# Patient Record
Sex: FEMALE | Race: BLACK OR AFRICAN AMERICAN | NOT HISPANIC OR LATINO | Employment: FULL TIME | URBAN - METROPOLITAN AREA
[De-identification: names, ages, dates, MRNs, and addresses within clinical notes are randomized per-mention and may not be internally consistent; named-entity substitution may affect disease eponyms.]

---

## 2023-02-15 PROBLEM — R10.2 VAGINAL PAIN: Status: ACTIVE | Noted: 2023-02-15

## 2023-02-15 PROBLEM — N92.0 HEAVY MENSES: Status: ACTIVE | Noted: 2023-02-15

## 2023-02-15 PROBLEM — G43.909 HEADACHE, MIGRAINE: Status: ACTIVE | Noted: 2023-02-15

## 2023-02-15 PROBLEM — N94.10 DYSPAREUNIA, FEMALE: Status: ACTIVE | Noted: 2023-02-15

## 2023-02-15 PROBLEM — R52 ACUTE PAIN: Status: ACTIVE | Noted: 2023-02-15

## 2023-02-15 PROBLEM — J20.9 ACUTE BRONCHITIS: Status: ACTIVE | Noted: 2023-02-15

## 2023-02-15 PROBLEM — D47.3 ESSENTIAL THROMBOCYTOSIS (MULTI): Status: ACTIVE | Noted: 2023-02-15

## 2023-02-15 PROBLEM — N39.0 ACUTE LOWER URINARY TRACT INFECTION: Status: ACTIVE | Noted: 2023-02-15

## 2023-02-15 PROBLEM — J30.1 SEASONAL ALLERGIC RHINITIS DUE TO POLLEN: Status: ACTIVE | Noted: 2023-02-15

## 2023-02-15 PROBLEM — F64.9 GENDER DYSPHORIA: Status: ACTIVE | Noted: 2023-02-15

## 2023-02-15 PROBLEM — N83.53 TORSION OF OVARY, OVARIAN PEDICLE, OR FALLOPIAN TUBE: Status: ACTIVE | Noted: 2023-02-15

## 2023-02-15 PROBLEM — R19.7 ACUTE DIARRHEA: Status: ACTIVE | Noted: 2023-02-15

## 2023-02-15 PROBLEM — F31.9 BIPOLAR AFFECTIVE DISORDER (MULTI): Status: ACTIVE | Noted: 2023-02-15

## 2023-02-15 PROBLEM — R63.5 ABNORMAL WEIGHT GAIN: Status: ACTIVE | Noted: 2023-02-15

## 2023-02-15 PROBLEM — F43.9 TRAUMA AND STRESSOR-RELATED DISORDER: Status: ACTIVE | Noted: 2023-02-15

## 2023-02-15 RX ORDER — GABAPENTIN 100 MG/1
CAPSULE ORAL
COMMUNITY
End: 2023-10-27 | Stop reason: ALTCHOICE

## 2023-02-15 RX ORDER — TOPIRAMATE 25 MG/1
TABLET ORAL
COMMUNITY
Start: 2022-08-15 | End: 2023-04-28 | Stop reason: SDUPTHER

## 2023-02-15 RX ORDER — ERGOCALCIFEROL 1.25 MG/1
CAPSULE ORAL
COMMUNITY
End: 2023-10-27 | Stop reason: ALTCHOICE

## 2023-02-15 RX ORDER — BUPROPION HYDROCHLORIDE 150 MG/1
TABLET, EXTENDED RELEASE ORAL
COMMUNITY
End: 2023-03-29 | Stop reason: ENTERED-IN-ERROR

## 2023-02-15 RX ORDER — ASPIRIN 81 MG/1
81 TABLET ORAL ONCE
COMMUNITY
Start: 2022-01-27

## 2023-02-15 RX ORDER — ALBUTEROL SULFATE 90 UG/1
AEROSOL, METERED RESPIRATORY (INHALATION)
COMMUNITY
End: 2023-10-27 | Stop reason: SDUPTHER

## 2023-02-15 RX ORDER — LAMOTRIGINE 100 MG/1
1 TABLET ORAL DAILY
COMMUNITY
Start: 2022-06-08 | End: 2023-04-28 | Stop reason: SDUPTHER

## 2023-02-15 RX ORDER — HYDROXYUREA 500 MG/1
4 CAPSULE ORAL DAILY
COMMUNITY
Start: 2015-07-29 | End: 2023-10-27 | Stop reason: SDUPTHER

## 2023-02-15 RX ORDER — CALCIUM CARBONATE/VITAMIN D3 500-10/5ML
LIQUID (ML) ORAL
COMMUNITY
End: 2023-10-27 | Stop reason: ALTCHOICE

## 2023-03-29 ENCOUNTER — OFFICE VISIT (OUTPATIENT)
Dept: PRIMARY CARE | Facility: CLINIC | Age: 38
End: 2023-03-29
Payer: COMMERCIAL

## 2023-03-29 VITALS
WEIGHT: 164.6 LBS | SYSTOLIC BLOOD PRESSURE: 125 MMHG | TEMPERATURE: 98.3 F | DIASTOLIC BLOOD PRESSURE: 81 MMHG | BODY MASS INDEX: 26.45 KG/M2 | HEIGHT: 66 IN | HEART RATE: 88 BPM | OXYGEN SATURATION: 100 %

## 2023-03-29 DIAGNOSIS — L81.1 CHLOASMA: ICD-10-CM

## 2023-03-29 DIAGNOSIS — F64.9 GENDER DYSPHORIA: ICD-10-CM

## 2023-03-29 DIAGNOSIS — L68.0 HIRSUTISM: Primary | ICD-10-CM

## 2023-03-29 DIAGNOSIS — Z00.00 HEALTH MAINTENANCE EXAMINATION: ICD-10-CM

## 2023-03-29 DIAGNOSIS — T78.40XS ALLERGIC REACTION, SEQUELA: ICD-10-CM

## 2023-03-29 PROBLEM — N92.0 HEAVY MENSES: Status: RESOLVED | Noted: 2023-02-15 | Resolved: 2023-03-29

## 2023-03-29 PROBLEM — D47.3 ESSENTIAL THROMBOCYTHEMIA (MULTI): Status: ACTIVE | Noted: 2023-03-29

## 2023-03-29 PROBLEM — R10.2 VAGINAL PAIN: Status: RESOLVED | Noted: 2023-02-15 | Resolved: 2023-03-29

## 2023-03-29 PROBLEM — D47.3 ESSENTIAL THROMBOCYTOSIS (MULTI): Status: RESOLVED | Noted: 2023-02-15 | Resolved: 2023-03-29

## 2023-03-29 PROBLEM — N39.0 ACUTE LOWER URINARY TRACT INFECTION: Status: RESOLVED | Noted: 2023-02-15 | Resolved: 2023-03-29

## 2023-03-29 PROBLEM — N94.10 DYSPAREUNIA, FEMALE: Status: RESOLVED | Noted: 2023-02-15 | Resolved: 2023-03-29

## 2023-03-29 PROBLEM — R19.7 ACUTE DIARRHEA: Status: RESOLVED | Noted: 2023-02-15 | Resolved: 2023-03-29

## 2023-03-29 PROBLEM — J20.9 ACUTE BRONCHITIS: Status: RESOLVED | Noted: 2023-02-15 | Resolved: 2023-03-29

## 2023-03-29 PROCEDURE — 1036F TOBACCO NON-USER: CPT | Performed by: FAMILY MEDICINE

## 2023-03-29 PROCEDURE — 99214 OFFICE O/P EST MOD 30 MIN: CPT | Performed by: FAMILY MEDICINE

## 2023-03-29 RX ORDER — TRETINOIN 0.5 MG/G
CREAM TOPICAL NIGHTLY
Qty: 45 G | Refills: 5 | Status: SHIPPED | OUTPATIENT
Start: 2023-03-29 | End: 2024-03-28

## 2023-03-29 RX ORDER — CLINDAMYCIN PHOSPHATE 10 UG/ML
LOTION TOPICAL DAILY
Qty: 60 ML | Refills: 2 | Status: SHIPPED | OUTPATIENT
Start: 2023-03-29 | End: 2024-03-28

## 2023-03-29 RX ORDER — HYDROCORTISONE 25 MG/G
CREAM TOPICAL DAILY
Qty: 30 G | Refills: 5 | Status: SHIPPED | OUTPATIENT
Start: 2023-03-29 | End: 2024-03-28

## 2023-03-29 RX ORDER — ZIPRASIDONE HYDROCHLORIDE 40 MG/1
40 CAPSULE ORAL DAILY
COMMUNITY
End: 2023-10-27 | Stop reason: ALTCHOICE

## 2023-03-29 RX ORDER — HYDROQUINONE 40 MG/G
CREAM TOPICAL DAILY
Qty: 28 G | Refills: 1 | Status: SHIPPED | OUTPATIENT
Start: 2023-03-29 | End: 2024-03-28

## 2023-03-29 ASSESSMENT — PAIN SCALES - GENERAL: PAINLEVEL: 0-NO PAIN

## 2023-03-29 NOTE — PROGRESS NOTES
"Subjective   Patient ID: Veena Manriquez is a 37 y.o. who presents for New Patient Visit (Patient here for regular office visit).  HPI    Concern about PCOS  -Has always grown facial hair  -Had heavy menses  -Had prior ovarian cysts. History of ovarian torsion  -nonbinary AFAB individual, looking to remain hormonally neutral. Bothered by facial hair. Causes dysphoria    Gender dysphoria  - Has undergone top surgery and phalloplasty. Undergoing next stage in May  - Has dysphoria over facial hair (as above)  - Uses any pronouns    Macules on arm  - Follows with dermatology  - Thought to be reaction to mosquito bite  - Recommended hydoquinone 6%, tretinoin 0.05%, hydrocortisone 2.5 qod  -clindamycin 1% on face    Request for allergist  - Concerned that this may be related to skin condition  - Also worried about food allergy, particularly milk    History of essential thrombocythemia, Jak2  - Taking hydroxyurea, following with heme/onc    Additionally has concern about short term memory loss and about brittle hair. Will further address at next appointment.     Review of Systems  10 point review of systems negative except as noted in HPI.      Objective     /81 (BP Location: Left arm, Patient Position: Sitting, BP Cuff Size: Small adult)   Pulse 88   Temp 36.8 °C (98.3 °F) (Temporal)   Ht 1.676 m (5' 6\")   Wt 74.7 kg (164 lb 9.6 oz)   SpO2 100%   BMI 26.57 kg/m²   General: well appearing, no distress  Neck: No lymphadenopathy, no thyromegaly  CV: Regular rate and rhythm, no murmur  Lungs: Clear to auscultation bilaterally  Abdomen: Soft, nontender, nondistended  Extremities: No edema noted  Skin: Hyperpigmented macules scattered on arms. Hair growth noted on chin  Psych: Appropriate mood and affect     Assessment/Plan   38 yo here to establish care. Plan as follows:    #Concern for PCOS  - Given h/o clinical hyperandrogenism and irregular menses I think there is a possibility for PCOS. However currently no menses " due to surgical status. We discussed checking testosterone level and considering medication- OCP or spironolactone- pending results. They would also like to speak with Dr. Mccann and will schedule an appointment.     #Skin lesions  - Management per dermatology    #Essential thrombocythemia  - Labs stable, continue current regimen    #Bipolar disorder  - Management per psychiatry    #HM  - Check lipids    Follow up 1 month or sooner as needed

## 2023-04-21 ENCOUNTER — LAB (OUTPATIENT)
Dept: LAB | Facility: LAB | Age: 38
End: 2023-04-21
Payer: COMMERCIAL

## 2023-04-21 DIAGNOSIS — L68.0 HIRSUTISM: ICD-10-CM

## 2023-04-21 DIAGNOSIS — Z00.00 HEALTH MAINTENANCE EXAMINATION: ICD-10-CM

## 2023-04-21 LAB
CHOLESTEROL (MG/DL) IN SER/PLAS: 117 MG/DL (ref 0–199)
CHOLESTEROL IN HDL (MG/DL) IN SER/PLAS: 27 MG/DL
CHOLESTEROL/HDL RATIO: 4.3
LDL: 37 MG/DL (ref 0–99)
NON HDL CHOLESTEROL: 90 MG/DL
TESTOSTERONE (NG/DL) IN SER/PLAS: <60 NG/DL (ref 0–70)
TRIGLYCERIDE (MG/DL) IN SER/PLAS: 267 MG/DL (ref 0–149)
VLDL: 53 MG/DL (ref 0–40)

## 2023-04-21 PROCEDURE — 84403 ASSAY OF TOTAL TESTOSTERONE: CPT

## 2023-04-21 PROCEDURE — 80061 LIPID PANEL: CPT

## 2023-04-22 LAB — URINE CULTURE: NORMAL

## 2023-04-28 ENCOUNTER — OFFICE VISIT (OUTPATIENT)
Dept: PRIMARY CARE | Facility: CLINIC | Age: 38
End: 2023-04-28
Payer: COMMERCIAL

## 2023-04-28 VITALS
DIASTOLIC BLOOD PRESSURE: 78 MMHG | TEMPERATURE: 98.6 F | OXYGEN SATURATION: 98 % | BODY MASS INDEX: 26.37 KG/M2 | SYSTOLIC BLOOD PRESSURE: 126 MMHG | WEIGHT: 163.4 LBS | HEART RATE: 76 BPM

## 2023-04-28 DIAGNOSIS — L68.0 HIRSUTISM: Primary | ICD-10-CM

## 2023-04-28 DIAGNOSIS — F31.9 BIPOLAR AFFECTIVE DISORDER, REMISSION STATUS UNSPECIFIED (MULTI): ICD-10-CM

## 2023-04-28 DIAGNOSIS — E78.1 HYPERTRIGLYCERIDEMIA WITHOUT HYPERCHOLESTEROLEMIA: ICD-10-CM

## 2023-04-28 PROCEDURE — 99214 OFFICE O/P EST MOD 30 MIN: CPT | Performed by: FAMILY MEDICINE

## 2023-04-28 PROCEDURE — 1036F TOBACCO NON-USER: CPT | Performed by: FAMILY MEDICINE

## 2023-04-28 RX ORDER — LAMOTRIGINE 100 MG/1
100 TABLET ORAL DAILY
Qty: 30 TABLET | Refills: 1 | Status: SHIPPED | OUTPATIENT
Start: 2023-04-28 | End: 2023-10-27 | Stop reason: SDUPTHER

## 2023-04-28 RX ORDER — TOPIRAMATE 25 MG/1
25 TABLET ORAL 2 TIMES DAILY
Qty: 60 TABLET | Refills: 1 | Status: SHIPPED | OUTPATIENT
Start: 2023-04-28 | End: 2023-10-27 | Stop reason: ALTCHOICE

## 2023-04-28 RX ORDER — SPIRONOLACTONE 50 MG/1
50 TABLET, FILM COATED ORAL DAILY
Qty: 30 TABLET | Refills: 2 | Status: SHIPPED | OUTPATIENT
Start: 2023-04-28 | End: 2024-04-27

## 2023-04-28 RX ORDER — BUPROPION HYDROCHLORIDE 150 MG/1
150 TABLET, EXTENDED RELEASE ORAL 2 TIMES DAILY
Qty: 60 TABLET | Refills: 1 | Status: SHIPPED | OUTPATIENT
Start: 2023-04-28 | End: 2023-10-27 | Stop reason: SDUPTHER

## 2023-04-28 RX ORDER — PRAZOSIN HYDROCHLORIDE 1 MG/1
1 CAPSULE ORAL NIGHTLY
Qty: 30 CAPSULE | Refills: 11 | Status: SHIPPED | OUTPATIENT
Start: 2023-04-28 | End: 2023-10-27 | Stop reason: ALTCHOICE

## 2023-04-28 RX ORDER — SPIRONOLACTONE 50 MG/1
50 TABLET, FILM COATED ORAL DAILY
Qty: 30 TABLET | Refills: 2 | Status: SHIPPED | OUTPATIENT
Start: 2023-04-28 | End: 2023-04-28

## 2023-04-28 ASSESSMENT — PAIN SCALES - GENERAL: PAINLEVEL: 0-NO PAIN

## 2023-04-28 ASSESSMENT — ENCOUNTER SYMPTOMS
RHINORRHEA: 0
HEADACHES: 0
ABDOMINAL PAIN: 0
CHILLS: 0
NAUSEA: 0
ARTHRALGIAS: 0
DIARRHEA: 0
DIZZINESS: 0
JOINT SWELLING: 0
SHORTNESS OF BREATH: 0
SORE THROAT: 0
VOMITING: 0
LIGHT-HEADEDNESS: 0
FEVER: 0
COUGH: 0

## 2023-04-28 NOTE — PROGRESS NOTES
Subjective   Patient ID: Veena Manriquez is a 37 y.o. adult who presents for Follow-up (Cholesterol and meds).    HPI  #Hypertriglyceridemia  - pt concerned about elevated cholesterol  - has changed diet significantly, cut out a lot of fried foods and sweets, plus incorporated more vegetables    #Hirsutism  - not interested in hormone therapy  - wants to get rid facial hair permanently  - shaving off hair since age of 15  - facial hair causing dsyphoria    #Meds Refills Needed:  Topiramate 25mg (two tablets)  Wellbutrin 150mg SR (twice a day)  Stopped Geodon 40mg- now on prazosin  Lamictal 100mg for PTSD  Gabapentin 100mg once a day    #Bipolar Disorder  - driving to [facility] [facility], cannot affford the gas money to go there 3x a week  - still seeing therapist and psychiatrist in addition to IOP  - stopped taking Geodon, new medicaiton prazosin is working well  - plans to quit intensive outpatient counseling      Review of Systems   Constitutional:  Negative for chills and fever.   HENT:  Negative for congestion, rhinorrhea and sore throat.    Eyes:  Negative for visual disturbance.   Respiratory:  Negative for cough and shortness of breath.    Cardiovascular:  Negative for chest pain.   Gastrointestinal:  Negative for abdominal pain, diarrhea, nausea and vomiting.   Genitourinary: Negative.    Musculoskeletal:  Negative for arthralgias and joint swelling.   Neurological:  Negative for dizziness, light-headedness and headaches.   Psychiatric/Behavioral:          No changes in mood or behavior       Objective   /78 (BP Location: Right arm, Patient Position: Sitting, BP Cuff Size: Adult)   Pulse 76   Temp 37 °C (98.6 °F) (Temporal)   Wt 74.1 kg (163 lb 6.4 oz)   SpO2 98%   BMI 26.37 kg/m²      Physical Exam  Constitutional:       General: Veena Manriquez is not in acute distress.  HENT:      Head: Normocephalic and atraumatic.   Eyes:      Extraocular Movements: Extraocular movements intact.       Conjunctiva/sclera: Conjunctivae normal.   Cardiovascular:      Rate and Rhythm: Normal rate and regular rhythm.   Pulmonary:      Effort: Pulmonary effort is normal.      Breath sounds: Normal breath sounds.   Abdominal:      General: There is no distension.      Palpations: Abdomen is soft.   Musculoskeletal:         General: Normal range of motion.      Cervical back: Normal range of motion.   Skin:     General: Skin is warm and dry.   Neurological:      General: No focal deficit present.      Mental Status: Veena Manriquez is alert and oriented to person, place, and time.      Comments: CN 2-12 grossly intact   Psychiatric:         Mood and Affect: Mood normal.         Behavior: Behavior normal.       Assessment/Plan   Veena was seen today for follow-up.  Diagnoses and all orders for this visit:  Hirsutism  Comments:  Restarted spironolactone to help reduce hirsuitism.  Orders:  -     Discontinue: spironolactone (Aldactone) 50 mg tablet; Take 1 tablet (50 mg) by mouth once daily.  -     spironolactone (Aldactone) 50 mg tablet; Take 1 tablet (50 mg) by mouth once daily.  Bipolar affective disorder, remission status unspecified (CMS/[facility])  Comments:  Refilled meds until patient follows up with their Psychiatrist  Orders:  -     prazosin (Minipress) 1 mg capsule; Take 1 capsule (1 mg) by mouth once daily at bedtime.  -     lamoTRIgine (LaMICtal) 100 mg tablet; Take 1 tablet (100 mg) by mouth once daily.  -     buPROPion SR (Wellbutrin SR) 150 mg 12 hr tablet; Take 1 tablet (150 mg) by mouth 2 times a day. Do not crush, chew, or split.  -     topiramate (Topamax) 25 mg tablet; Take 1 tablet (25 mg) by mouth 2 times a day. TAKE 1 TABLET BY MOUTH AT BEDTIME, AFTER 2 WEEKS, IF TOLERATING WELL, INCREASE TO 2 TABLETS AT BEDTIME  Hypertriglyceridemia without hypercholesterolemia  Comments:  Likely 2/2 to history of Geodon use. Will recheck in few months now that patient if off Geodon.  Other orders  -     Follow Up In Primary  Care  -     Follow Up In Primary Care; Future  -     Follow Up In Primary Care; Future    Follow up/Return to the clinic in 3 months    Attending Supervision: Patient seen and discussed with attending physician, Dr. Quinton Dunne MD  Family Medicine, PGY-2

## 2023-05-01 NOTE — PROGRESS NOTES
I saw and evaluated the patient. I personally obtained the key and critical portions of the history and physical exam or was physically present for key and critical portions performed by the resident/fellow. I reviewed the resident/fellow's documentation and discussed the patient with the resident/fellow. I agree with the resident/fellow's medical decision making as documented in the note.    Emmy Alcantara MD

## 2023-05-07 LAB — SARS-COV-2 RESULT: NOT DETECTED

## 2023-05-10 ENCOUNTER — HOSPITAL ENCOUNTER (OUTPATIENT)
Dept: DATA CONVERSION | Facility: HOSPITAL | Age: 38
End: 2023-05-11
Attending: UROLOGY | Admitting: UROLOGY
Payer: COMMERCIAL

## 2023-05-10 DIAGNOSIS — Z79.82 LONG TERM (CURRENT) USE OF ASPIRIN: ICD-10-CM

## 2023-05-10 DIAGNOSIS — G43.909 MIGRAINE, UNSPECIFIED, NOT INTRACTABLE, WITHOUT STATUS MIGRAINOSUS: ICD-10-CM

## 2023-05-10 DIAGNOSIS — F64.9 GENDER IDENTITY DISORDER, UNSPECIFIED: ICD-10-CM

## 2023-05-10 DIAGNOSIS — R63.5 ABNORMAL WEIGHT GAIN: ICD-10-CM

## 2023-05-10 DIAGNOSIS — D68.9 COAGULATION DEFECT, UNSPECIFIED (MULTI): ICD-10-CM

## 2023-05-10 DIAGNOSIS — J45.909 UNSPECIFIED ASTHMA, UNCOMPLICATED (HHS-HCC): ICD-10-CM

## 2023-05-10 DIAGNOSIS — R05.9 COUGH, UNSPECIFIED: ICD-10-CM

## 2023-05-10 DIAGNOSIS — D47.3 ESSENTIAL (HEMORRHAGIC) THROMBOCYTHEMIA (MULTI): ICD-10-CM

## 2023-05-10 DIAGNOSIS — F31.9 BIPOLAR DISORDER, UNSPECIFIED (MULTI): ICD-10-CM

## 2023-05-10 DIAGNOSIS — Z15.89 GENETIC SUSCEPTIBILITY TO OTHER DISEASE: ICD-10-CM

## 2023-05-10 DIAGNOSIS — F43.10 POST-TRAUMATIC STRESS DISORDER, UNSPECIFIED: ICD-10-CM

## 2023-06-06 LAB
GRAM STAIN: ABNORMAL
TISSUE/WOUND CULTURE/SMEAR: ABNORMAL
TISSUE/WOUND CULTURE/SMEAR: ABNORMAL

## 2023-07-20 ENCOUNTER — APPOINTMENT (OUTPATIENT)
Dept: PRIMARY CARE | Facility: CLINIC | Age: 38
End: 2023-07-20
Payer: COMMERCIAL

## 2023-07-21 LAB
GRAM STAIN: NORMAL
TISSUE/WOUND CULTURE/SMEAR: NORMAL

## 2023-08-16 LAB
ANION GAP IN SER/PLAS: 14 MMOL/L (ref 10–20)
CALCIUM (MG/DL) IN SER/PLAS: 9.8 MG/DL (ref 8.6–10.6)
CARBON DIOXIDE, TOTAL (MMOL/L) IN SER/PLAS: 22 MMOL/L (ref 21–32)
CHLORIDE (MMOL/L) IN SER/PLAS: 107 MMOL/L (ref 98–107)
CHOLESTEROL (MG/DL) IN SER/PLAS: 199 MG/DL (ref 0–199)
CHOLESTEROL IN HDL (MG/DL) IN SER/PLAS: 32.8 MG/DL
CHOLESTEROL/HDL RATIO: 6.1
CREATININE (MG/DL) IN SER/PLAS: 0.96 MG/DL (ref 0.5–1.05)
GFR FEMALE: 77 ML/MIN/1.73M2
GLUCOSE (MG/DL) IN SER/PLAS: 123 MG/DL (ref 74–99)
LDL: 111 MG/DL (ref 0–99)
NON HDL CHOLESTEROL: 166 MG/DL
POTASSIUM (MMOL/L) IN SER/PLAS: 3.9 MMOL/L (ref 3.5–5.3)
SODIUM (MMOL/L) IN SER/PLAS: 139 MMOL/L (ref 136–145)
TRIGLYCERIDE (MG/DL) IN SER/PLAS: 276 MG/DL (ref 0–149)
UREA NITROGEN (MG/DL) IN SER/PLAS: 13 MG/DL (ref 6–23)
VLDL: 55 MG/DL (ref 0–40)

## 2023-08-19 PROBLEM — J45.20 MILD INTERMITTENT ASTHMA WITHOUT COMPLICATION (HHS-HCC): Status: ACTIVE | Noted: 2023-08-19

## 2023-08-19 PROBLEM — T14.8XXA WOUND INFECTION: Status: ACTIVE | Noted: 2023-08-19

## 2023-08-19 PROBLEM — C44.92 SCC (SQUAMOUS CELL CARCINOMA): Status: ACTIVE | Noted: 2023-08-19

## 2023-08-19 PROBLEM — B37.0 ORAL CANDIDA: Status: ACTIVE | Noted: 2023-08-19

## 2023-08-19 PROBLEM — L08.9 WOUND INFECTION: Status: ACTIVE | Noted: 2023-08-19

## 2023-08-19 RX ORDER — ACETAMINOPHEN 500 MG
1-2 TABLET ORAL EVERY 6 HOURS PRN
COMMUNITY
Start: 2023-03-15

## 2023-08-19 RX ORDER — ALBUTEROL SULFATE 90 UG/1
2 AEROSOL, METERED RESPIRATORY (INHALATION) SEE ADMIN INSTRUCTIONS
COMMUNITY
Start: 2023-06-28

## 2023-08-19 RX ORDER — HYDROXYUREA 500 MG/1
500 CAPSULE ORAL 2 TIMES DAILY
COMMUNITY
Start: 2016-01-27 | End: 2023-11-30 | Stop reason: SDUPTHER

## 2023-08-19 RX ORDER — GABAPENTIN 300 MG/1
300 CAPSULE ORAL
COMMUNITY
End: 2023-11-30 | Stop reason: SDUPTHER

## 2023-08-19 RX ORDER — FLUTICASONE PROPIONATE 50 MCG
2 SPRAY, SUSPENSION (ML) NASAL DAILY
COMMUNITY
Start: 2023-06-28 | End: 2023-10-27 | Stop reason: SDUPTHER

## 2023-08-19 RX ORDER — BUPROPION HYDROCHLORIDE 100 MG/1
1 TABLET, EXTENDED RELEASE ORAL DAILY
COMMUNITY
Start: 2023-05-18 | End: 2023-10-27 | Stop reason: SDUPTHER

## 2023-08-19 RX ORDER — BUPROPION HYDROCHLORIDE 150 MG/1
1 TABLET, EXTENDED RELEASE ORAL DAILY
COMMUNITY
Start: 2023-05-18 | End: 2023-10-27 | Stop reason: SDUPTHER

## 2023-08-19 RX ORDER — LAMOTRIGINE 25 MG/1
25 TABLET ORAL
COMMUNITY
End: 2023-10-27 | Stop reason: ALTCHOICE

## 2023-08-19 RX ORDER — ONDANSETRON 4 MG/1
4 TABLET, ORALLY DISINTEGRATING ORAL EVERY 8 HOURS PRN
COMMUNITY
Start: 2023-03-15 | End: 2023-10-27 | Stop reason: ALTCHOICE

## 2023-08-19 RX ORDER — CHLORHEXIDINE GLUCONATE ORAL RINSE 1.2 MG/ML
15 SOLUTION DENTAL 2 TIMES DAILY
COMMUNITY
Start: 2023-05-23 | End: 2023-10-27 | Stop reason: ALTCHOICE

## 2023-08-19 RX ORDER — TRIAMCINOLONE ACETONIDE 1 MG/G
1 OINTMENT TOPICAL AS NEEDED
COMMUNITY
Start: 2023-03-30

## 2023-08-19 RX ORDER — CHOLECALCIFEROL (VITAMIN D3) 25 MCG
2000 TABLET ORAL
COMMUNITY

## 2023-08-19 RX ORDER — BUPROPION HYDROCHLORIDE 150 MG/1
150 TABLET, EXTENDED RELEASE ORAL
COMMUNITY
Start: 2023-05-19 | End: 2023-10-27 | Stop reason: SDUPTHER

## 2023-08-19 RX ORDER — CLOBETASOL PROPIONATE 0.5 MG/G
1 OINTMENT TOPICAL AS NEEDED
COMMUNITY
Start: 2023-03-09

## 2023-08-19 RX ORDER — FLUTICASONE PROPIONATE 50 MCG
1 SPRAY, SUSPENSION (ML) NASAL
COMMUNITY
Start: 2021-05-14

## 2023-08-19 RX ORDER — TOPIRAMATE 50 MG/1
50 TABLET, FILM COATED ORAL NIGHTLY
COMMUNITY
Start: 2023-05-19 | End: 2023-10-27 | Stop reason: SDUPTHER

## 2023-08-19 RX ORDER — DOCUSATE SODIUM 100 MG/1
100 CAPSULE, LIQUID FILLED ORAL 2 TIMES DAILY
COMMUNITY
Start: 2023-03-15

## 2023-08-19 RX ORDER — LANOLIN ALCOHOL/MO/W.PET/CERES
400 CREAM (GRAM) TOPICAL AS NEEDED
COMMUNITY
Start: 2013-11-14 | End: 2023-10-27 | Stop reason: ALTCHOICE

## 2023-08-19 RX ORDER — METFORMIN HYDROCHLORIDE 500 MG/1
500 TABLET, EXTENDED RELEASE ORAL
COMMUNITY
Start: 2023-01-16 | End: 2023-10-27 | Stop reason: ALTCHOICE

## 2023-08-19 RX ORDER — METFORMIN HYDROCHLORIDE 500 MG/1
1000 TABLET, EXTENDED RELEASE ORAL
COMMUNITY
Start: 2023-05-19 | End: 2023-10-27 | Stop reason: ALTCHOICE

## 2023-08-19 RX ORDER — CETIRIZINE HYDROCHLORIDE 10 MG/1
1 TABLET ORAL DAILY PRN
COMMUNITY
Start: 2023-06-28 | End: 2023-10-27 | Stop reason: ALTCHOICE

## 2023-08-19 RX ORDER — BUPROPION HYDROCHLORIDE 150 MG/1
150 TABLET, EXTENDED RELEASE ORAL 2 TIMES DAILY
COMMUNITY
Start: 2023-01-16 | End: 2023-10-27 | Stop reason: SDUPTHER

## 2023-09-07 VITALS
HEART RATE: 91 BPM | SYSTOLIC BLOOD PRESSURE: 118 MMHG | RESPIRATION RATE: 16 BRPM | OXYGEN SATURATION: 99 % | DIASTOLIC BLOOD PRESSURE: 75 MMHG | BODY MASS INDEX: 26.01 KG/M2 | WEIGHT: 161.82 LBS | HEIGHT: 66 IN | TEMPERATURE: 97.9 F

## 2023-09-14 NOTE — H&P
History of Present Illness:   Pregnant/Lactating:  ·  Are You Pregnant no (1)   ·  Are You Currently Breastfeeding no (1)     History Present Illness:  Reason for surgery: gender dysphoria   HPI:    38 yo nonbinary individual with history of JAK2+ essntial thrombocyhemia, anxiety/depression, asthma, PTSD, bipolar disorder, nephrolithiasis, gender dysphoria s/p  abdominal phalloplasty in 3/2022 and STSG stage 1 urethroplasty 9/2022 who presents for staged buccal graft urethroplasty for hypertrophic scarring of urethral plate. Patint also with no snsation of neophallus.     Allergies:        Allergies:  ·  erythromycin : Other       Intolerances:  ·  hydrocodone : Nausea/Vomiting    Home Medication Review:   Home Medications Reviewed: yes     Impression/Procedure:   ·  Impression and Planned Procedure: staged urethroplasty with buccal graft, left sural nerve grafting and clitoral burying       ERAS (Enhanced Recovery After Surgery):  ·  ERAS Patient: no       Vital Signs:  Temperature C: 36.6 degrees C   Temperature F: 97.8 degrees F   Heart Rate: 85 beats per minute   Respiratory Rate: 16 breath per minute   Blood Pressure Systolic: 118 mm/Hg   Blood Pressure Diastolic: 75 mm/Hg     Physical Exam by System:    Respiratory/Thorax: nonlabored on RA   Cardiovascular: RRR     Consent:   COVID-19 Consent:  ·  COVID-19 Risk Consent Surgeon has reviewed key risks related to the risk of eric COVID-19 and if they contract COVID-19 what the risks are.     Attestation:   Note Completion:  I am a:  Resident/Fellow   Attending Attestation I saw and evaluated the patient.  I personally obtained the key and critical portions of the history and physical exam or was physically present for key and  critical portions performed by the resident/fellow. I reviewed the resident/fellow?s documentation and discussed the patient with the resident/fellow.  I agree with the resident/fellow?s medical decision making as documented in  "the note.     I personally evaluated the patient on 10-May-2023         Electronic Signatures:  Mario Montes)  (Signed 11-May-2023 14:25)   Authored: Note Completion   Co-Signer: History of Present Illness, Allergies, Home Medication Review, Impression/Procedure, ERAS, Physical Exam, Consent, Note Completion  Kendra Villalba (Resident))  (Signed 10-May-2023 08:12)   Authored: History of Present Illness, Allergies, Home  Medication Review, Impression/Procedure, ERAS, Physical Exam, Consent, Note Completion      Last Updated: 11-May-2023 14:25 by Mario Montes)    References:  1.  Data Referenced From \"Patient Profile - Preop v3\" 10-May-2023 07:49   "

## 2023-09-14 NOTE — PROGRESS NOTES
Service: Urology     Subjective Data:   DINESH MANRIQUEZ)  is a 37 year old trans male (They/Them) who is Hospital Day # 2 and POD #1 for staged urethroplasty with buccal mucosal graft and preputial graft, clitoral transposition, left clitoral  nerve neurolysis and Avance nerve grafting, complex wound closure with tubularization of neophallus.     Tolerated procedure well. Post-operatively was experiencing some right lower leg numbness, saying they had difficulty walking. This AM strength and mobility of right lower extremity has significantly  improved. Tolerating liquids. No n/v, passing of gas or BM.    Objective Data:     Objective Information:      T   P  R  BP   MAP  SpO2   Value  36.5  89  18  104/65      99%  Date/Time 5/11 5:18 5/11 5:18 5/11 5:18 5/11 5:18    5/11 5:18  Range  (36.4C - 36.7C )  (66 - 89 )  (16 - 18 )  (104 - 132 )/ (65 - 82 )    (98% - 100% )      Pain reported at 5/11 10:52: 2 = Mild    Physical Exam Narrative:  ·  Physical Exam:    Constitutional: NAD, awake and alert  Head/neck: NCAT, neck supple with trachea midline  Eyes: EOMI, sclerae anicteric  ENT: moist mucous membranes, left inner cheek graft site healing well  Pulm: Breathing comfortably on RA  CV: Regular rate and rhythm  GI: Abd soft, NT, ND  : well healed neophallus with ventral incision that is c/d/i, native urethra patent  Extremities: No LE edema, full strength bilaterally in lower extremities, reports numbness in right lower extremity  Psych: Appropriate mood and behavior        Assessment and Plan:   Code Status:  ·  Code Status Full Code     Assessment:    DINESH Manriquez (Lanea) is a 37 year old trans male (They/Danni) with history of stage 1 ant abdominal phalloplasty 3/2022, stage 2a phalloplasty 9/2022, now s/p staged  urethroplasty with buccal mucosal graft and preputial graft, clitoral transposition, L clitoral nerve neurolysis and avance nerve grafting, clarix interposition, complex wound closure w/  tubularization of neophallus with Dr. Montes on 5/10.     AVSS  UOP: 300 recorded    Plan:    Neuro/Pain:  -Oxycodone PRN for moderate/severe pain  -Tyelenol Q6H PRN for mild pain  -zofran q4h PRN for nausea  -continue home buproprion, lamotrigine, melatonin, prazosin, spironolactone, topiramate, albuterol inhaler    CV:  -Maintain MAP>65  -Continue to monitor BP  -ASA daily    Pulm:   -Encourage IS  -Maintain O2 >88%    GI/Diet:  -Regular Diet  -Bowel regimen  -chlorhexidine gluconate mouthwash BID  -chloraseptic spray PRN for mouth discomfort    /Renal:   -Strict I/Os  -Will order BMP as clinically indicated  -Replete electrolytes PRN    Heme/ID:   -Will order CBC as clinically indicated  -No indication for transfusion at this time  -bactrim q12h    Endocrine:   -No acute or chronic issues    MSK:  -Encourage ambulation  -PT for right leg numbness    DVT Ppx:  -SCDs  -SQH    Dispo: RNF    seen w/ Chief Resident Dr. Montes, d/w attending Dr. Simon Rea MD  Urology Resident Assist    Urology Floor Pager: [phone]  Urology Consult Pager: [phone]      Attestation:   Note Completion:  I am a:  Resident/Fellow   Attending Attestation I reviewed the resident/fellow?s documentation and discussed the patient with the resident/fellow.  I agree with the resident/fellow?s medical  decision making as documented in the note.          Electronic Signatures:  Catalina Rea (ASST)  (Signed 11-May-2023 13:46)   Authored: Service, Subjective Data, Objective Data, Assessment  and Plan, Note Completion  Mario Montes)  (Signed 17-May-2023 13:23)   Authored: Note Completion   Co-Signer: Service, Subjective Data, Objective Data, Assessment and Plan, Note Completion      Last Updated: 17-May-2023 13:23 by Mario Montes)

## 2023-09-16 ENCOUNTER — HOSPITAL ENCOUNTER (OUTPATIENT)
Facility: HOSPITAL | Age: 38
Setting detail: OUTPATIENT SURGERY
End: 2023-09-16
Attending: UROLOGY | Admitting: UROLOGY
Payer: COMMERCIAL

## 2023-10-02 NOTE — OP NOTE
PROCEDURE DETAILS    Preoperative Diagnosis:  gender dysphoria  Postoperative Diagnosis:  gender dysphoria  Surgeon: Mario Montes MD   Resident/Fellow/Other Assistant: MD Elmer Pepper MD    Procedure:  staged urethroplasty with buccal mucosal graft and preputial graft, clitoral transposition, left clitoral nerve neurolysis and Avance nerve grafting, Clarix interposition, complex wound closure with tubularization of neophallus  Anesthesia: general  Estimated Blood Loss: 20 cc  Blood Replaced: none  Findings: well-healed abdominal phallo with two hypertrophic scarred areas, LEFT clitoral nerve coapted to 7 cm Avance nerve graft; clitoral body buried in RIGHT base of neophallus  Specimens(s) Collected: no,     Complications: none   IV Fluids: 1800 cc LR  Urine Output: 100 cc  Drains and/or Catheters: none  Patient Returned To/Condition: Transferred to PACU in stable condition         Operative Report:   Indications for procedure: Lillian is a 37-year-old  nonbinary individual with a history of abdominal phalloplasty in 3/2022, staged split-thickness skin graft urethroplasty on 9/2022 who presents today for urethroplasty with buccal graft mucosa, clitoral transposition, left clitoral nerve neurolysis with  nerve graft  coaptation      Operative details: Patient was brought to the operating suite laid supine on the operating table. A preoperative huddle was performed, allergies were confirmed and antibiotics were administered using cefazolin and gentamicin.  General anesthesia was then  performed.  They were then prepped and draped in standard sterile fashion.  On inspection of the medial phallus and split thickness skin graft, there were areas of hypertrophic scar noted for a 6 cm segment distally, and a 4-5 cm segment proximally along  the midline.  The urethral plate was about 2-1/2 cm wide distally, 3 cm wide in the remainder; the remainder of the graft appeared healthy and pliable.  Several stay  sutures were applied to the neoglans, the clitoris, and the labia.    We began by marking a degloving incision along the clitoris circumferentially, as well as the borders of the urethral plate.  A 15 blade was used to incise the clitoral incision, and superficial attachments were incised sharply using Metzenbaums until  the clitoris was freed up proximally.  We focused our attention along the left aspect of the clitoral body, incising the tunica albuginea and carefully dissecting out the left clitoral nerve.  We identified several branches and passed a vessel loop around  the most proximal branch.    We then turned our attention to the neophallus.  A 15 blade was used to incise the margins of the urethral plate sharply and then dissected through the dermis into fat.  Care was taken not to undermine the vascularized urethral bed.  Careful hemostasis  was achieved using bipolar cautery.    The buccal mucosal graft was then harvested.  A self-retaining tractor was placed in the mouth, and the tongue was packed away.  Stay sutures were used along the vermilion border.  Stensen's duct on the left inner cheek was identified and marked, and  a graft of approximately 6 x 3 cm was outlined.  Lidocaine with epinephrine was injected for hydrodistention as well as hemostasis.  An incision was made sharply along the previously marked lines using a 15 blade.  The graft was lifted off sharply using  Metzenbaum scissors while sparing the underlying buccinator muscles.  The graft was defatted on the side table.  The wound bed was cauterized and carefully inspected for hemostasis.  The mouth was packed with a Ray-Krista soaked with lidocaine with epinephrine.   This was inspected at the end of the case and found to be hemostatic. The graft was taken to the main table in saline and the team regowned and regloved.    The epithelium overlying the clitoris was degloved and a segment of preputial skin taken and preserved as a graft.  This  graft was defatted and preserved in saline.  The remainder of the clitoral head was de-epithelialized.  A dyed Vicryl stitch was used  to secure the head of the clitoris to the skin along the right base of the neophallus.    A longitudinal incision was made in the 2 areas of scarred urethral plate using a 15 blade.  This was taken down to the fat.  The proximal and distal edges of each were splayed to fit a rectangular graft.  The buccal graft was secured to the distal urethral  plate as a dorsal onlay and secured using 4-0 Vicryl.  The graft was pie crusted.  Several quilting stitches were applied.  This process was repeated with the proximal segment using the clitoral skin graft.    We then turned our attention to the nerve coaptation.  Superficial incisions were made into branches of the left clitoral nerve neural sheath.  A 4 to 5 mm, 7 cm Avance nerve graft was obtained.  A tunnel was developed between the base of the LEFT neophallus  to the clitoral body and the Avance nerve graft passed carefully through. 8-0 nylon sutures were used to secure one end of the graft to the incised areas of the left clitoral nerve- this was an end to side coaptation. .  A soft tissue space was developed  in the gutter between the skin edge and the neourethra in the left proximal neophallus.  The distal end of the a nerve graft was secured and the neophallus, and then dead space closed over the nerve.  Graft Clarix 1k  was then applied over the exposed  clitoral body.  3-0 Vicryl was used to close dartos tissue, and then a 4-0 Monocryl was used to close the skin over the clitoris.    The urethra was then tubularized in a subcuticular manner using 4-0 PDS.  An additional layer was imbricated over this using running 4-0 Vicryl.  The Neosphallus skin edges were undermined to obtain additional mobility.  Clarix 1 k  was applied all along  the neourethra closure and secured using 4-0 Vicryl.  The proximal urethrostomy was matured using  4-0 Monocryl. the neophalllus was then closed in multiple layers using 4-0 PDS for the dermis layer and interrupted 4-0 Monocryl for the epithelium.  A 14  and 16 Mohawk bougie were used to sound the neourethra.  The penis was then cleaned.  Dermabond was applied along the incisions along with copious amounts of bacitracin.  The drapes were removed and dressings were applied.  All counts were correct.  The  patient was extubated and transferred to PACU in stable condition.    Disposition: The patient will be admitted overnight for observation and return in 2 weeks for postoperative visit.                        Attestation:   Note Completion:  Attending Attestation I was present for the entire procedure    I am a: Resident/Fellow         Electronic Signatures:  Mario Montes)  (Signed 11-May-2023 14:30)   Authored: Post-Operative Note, Chart Review, Note Completion   Co-Signer: Post-Operative Note, Chart Review, Note Completion  Kendra Villalba (Resident))  (Signed 10-May-2023 16:29)   Authored: Post-Operative Note, Chart Review, Note Completion      Last Updated: 11-May-2023 14:30 by Mario Montes)

## 2023-10-18 ENCOUNTER — CLINICAL SUPPORT (OUTPATIENT)
Dept: ALLERGY | Facility: HOSPITAL | Age: 38
End: 2023-10-18
Payer: COMMERCIAL

## 2023-10-18 VITALS
HEART RATE: 72 BPM | RESPIRATION RATE: 20 BRPM | WEIGHT: 159.6 LBS | BODY MASS INDEX: 25.65 KG/M2 | TEMPERATURE: 98.2 F | OXYGEN SATURATION: 98 % | HEIGHT: 66 IN | DIASTOLIC BLOOD PRESSURE: 71 MMHG | SYSTOLIC BLOOD PRESSURE: 115 MMHG

## 2023-10-18 DIAGNOSIS — J30.89 NON-SEASONAL ALLERGIC RHINITIS, UNSPECIFIED TRIGGER: ICD-10-CM

## 2023-10-18 PROCEDURE — 95117 IMMUNOTHERAPY INJECTIONS: CPT | Performed by: ALLERGY & IMMUNOLOGY

## 2023-10-18 NOTE — PROGRESS NOTES
Lillian here today for Veena Haru's regularly scheduled immunotherapy injection, per protocol. Patient in good state of health, received Veena Haru's shot as planned, as recorded in flowsheet for this encounter, waited for 30 minutes after Veena Haru's injection and left the office after that still at their baseline state of health. Will continue allergy immunotherapy as planned and call us in case any symptoms or reaction from today's injection are noted.

## 2023-10-25 ENCOUNTER — OFFICE VISIT (OUTPATIENT)
Dept: ALLERGY | Facility: HOSPITAL | Age: 38
End: 2023-10-25
Payer: COMMERCIAL

## 2023-10-25 VITALS
SYSTOLIC BLOOD PRESSURE: 119 MMHG | DIASTOLIC BLOOD PRESSURE: 72 MMHG | HEART RATE: 75 BPM | RESPIRATION RATE: 20 BRPM | OXYGEN SATURATION: 100 % | TEMPERATURE: 98 F

## 2023-10-25 DIAGNOSIS — J30.1 SEASONAL ALLERGIC RHINITIS DUE TO POLLEN: ICD-10-CM

## 2023-10-25 PROCEDURE — 3008F BODY MASS INDEX DOCD: CPT | Performed by: ALLERGY & IMMUNOLOGY

## 2023-10-25 PROCEDURE — 95117 IMMUNOTHERAPY INJECTIONS: CPT | Performed by: ALLERGY & IMMUNOLOGY

## 2023-10-25 PROCEDURE — 1036F TOBACCO NON-USER: CPT | Performed by: ALLERGY & IMMUNOLOGY

## 2023-10-25 NOTE — PROGRESS NOTES
Veena is here today for their regularly scheduled immunotherapy injection, per protocol. Patient in good state of health, received their shot as planned, as recorded in flowsheet for this encounter, waited for 30 minutes after their injection and left the office after that still at their baseline state of health. Will continue allergy immunotherapy as planned and call us in case any symptoms or reaction from today's injection are noted.

## 2023-10-27 ENCOUNTER — TELEMEDICINE (OUTPATIENT)
Dept: BEHAVIORAL HEALTH | Facility: CLINIC | Age: 38
End: 2023-10-27
Payer: COMMERCIAL

## 2023-10-27 DIAGNOSIS — F31.9 BIPOLAR 1 DISORDER (MULTI): Primary | ICD-10-CM

## 2023-10-27 DIAGNOSIS — F43.9 TRAUMA AND STRESSOR-RELATED DISORDER: ICD-10-CM

## 2023-10-27 PROCEDURE — 99215 OFFICE O/P EST HI 40 MIN: CPT | Performed by: PSYCHIATRY & NEUROLOGY

## 2023-10-27 RX ORDER — MINERAL OIL
1 ENEMA (ML) RECTAL DAILY
COMMUNITY
Start: 2023-09-06

## 2023-10-27 RX ORDER — TOPIRAMATE 50 MG/1
50 TABLET, FILM COATED ORAL NIGHTLY
Qty: 30 TABLET | Refills: 2 | Status: SHIPPED | OUTPATIENT
Start: 2023-10-27 | End: 2024-01-26 | Stop reason: SDUPTHER

## 2023-10-27 RX ORDER — PRAZOSIN HYDROCHLORIDE 1 MG/1
1 CAPSULE ORAL NIGHTLY
Qty: 30 CAPSULE | Refills: 2 | Status: SHIPPED | OUTPATIENT
Start: 2023-10-27 | End: 2024-01-26 | Stop reason: SDUPTHER

## 2023-10-27 RX ORDER — BUPROPION HYDROCHLORIDE 150 MG/1
150 TABLET, EXTENDED RELEASE ORAL DAILY
Qty: 30 TABLET | Refills: 2 | Status: SHIPPED | OUTPATIENT
Start: 2023-10-27 | End: 2024-01-26 | Stop reason: SDUPTHER

## 2023-10-27 RX ORDER — LAMOTRIGINE 100 MG/1
100 TABLET ORAL DAILY
Qty: 30 TABLET | Refills: 2 | Status: SHIPPED | OUTPATIENT
Start: 2023-10-27 | End: 2024-01-26 | Stop reason: SDUPTHER

## 2023-10-27 RX ORDER — ESCITALOPRAM OXALATE 10 MG/1
10 TABLET ORAL DAILY
Qty: 30 TABLET | Refills: 2 | Status: SHIPPED | OUTPATIENT
Start: 2023-10-27 | End: 2024-01-26 | Stop reason: SDUPTHER

## 2023-10-27 ASSESSMENT — ENCOUNTER SYMPTOMS
DYSPHORIC MOOD: 1
NERVOUS/ANXIOUS: 0

## 2023-10-27 NOTE — PROGRESS NOTES
"Adult Ambulatory Psychiatry Progress Note      Assessment/Plan     Impression:  Veena Manriquez is a 38 y.o. nonbinary domiciled alone, employed at car [employer] who presents for follow up with CC of Bipolar and trauma and stressor related disorder.    Plan:   Medication:  Diagnoses and all orders for this visit:  Bipolar 1 disorder (CMS/HCC)  -     buPROPion SR (Wellbutrin SR) 150 mg 12 hr tablet; Take 1 tablet (150 mg) by mouth once daily. Do not crush, chew, or split.  -     topiramate (Topamax) 50 mg tablet; Take 1 tablet (50 mg) by mouth once daily at bedtime.  -     lamoTRIgine (LaMICtal) 100 mg tablet; Take 1 tablet (100 mg) by mouth once daily.  Trauma and stressor-related disorder  -     escitalopram (Lexapro) 10 mg tablet; Take 1 tablet (10 mg) by mouth once daily.  -     prazosin (Minipress) 1 mg capsule; Take 1 capsule (1 mg) by mouth once daily at bedtime.      Subjective   HPI:  Pt presented on time. Mood \"a lot better\" since last session. He went to [place] and didn't know he had to register his medicines in advance and they were all thrown away. Without his medicine he started having some benji. His sleep worsened and he walked a lot. He started to have symptoms of his thrombocythemia. He started to feel depressed and irritable. He was waking up mad every day. While there his boyfriend got the news that his mother has ovarian cancer. Pt started having crying spells. He started having SI by the end of the trip. When he got home he talked to his SW who said he should resumed his medicines. He's feeling better so he can go back to work. Advised pt to let writer know next time he plans to travel and can help ensure pt can travel with his meds. Denies further manic sx. Denies SI today. Sleep is gradually improving. Appetite ok. Taking medicine as prescribed. Denies significant SE. Pt was educated that if they feel a danger to themselves or others to go to the nearest emergency room or call " "[phone].            Review of Systems   Psychiatric/Behavioral:  Positive for dysphoric mood. Negative for suicidal ideas. The patient is not nervous/anxious.          Objective   Mental Status Exam:  General Appearance: Well groomed, appropriate eye contact  Attitude/Behavior: Cooperative  Motor: No psychomotor agitation or retardation, no tremor or other abnormal movements  Speech: Other: (comment) (slightly pressured but interruptible)  Mood: \"better now\"  Affect: Dysphoric, constricted  Thought Process: Linear, goal directed  Thought Associations: No loosening of associations  Thought Content: Normal  Perception: No perceptual abnormalities noted  Insight: Intact  Judgement: Intact    Vitals:  There were no vitals filed for this visit.    Current Medications:  Current Outpatient Medications on File Prior to Visit   Medication Sig Dispense Refill    fexofenadine (Allegra) 180 mg tablet Take 1 tablet (180 mg) by mouth once daily.      acetaminophen (Tylenol) 500 mg tablet Take 1-2 tablets (500-1,000 mg) by mouth every 6 hours if needed for mild pain (1 - 3).      albuterol 90 mcg/actuation inhaler Inhale 2 puffs see administration instructions. EVERY 4-6 HOURS AS NEEDED.      aspirin 81 mg EC tablet Take 1 tablet (81 mg) by mouth 1 time.      cholecalciferol (Vitamin D-3) 25 MCG (1000 UT) tablet Take 2 tablets (2,000 Units) by mouth once daily.      clindamycin (Cleocin T) 1 % lotion Apply topically once daily. 60 mL 2    clobetasol (Temovate) 0.05 % ointment Apply 1 Application topically if needed. APPLY TO MOSQUITO BITES AS NEEDED      docusate sodium (Colace) 100 mg capsule Take 1 capsule (100 mg) by mouth twice a day.      fluticasone (Flonase) 50 mcg/actuation nasal spray 1 spray by Does not apply route once daily.      gabapentin (Neurontin) 300 mg capsule Take 1 capsule (300 mg) by mouth once daily.      hydrocortisone 2.5 % cream Apply topically once daily. 30 g 5    hydroquinone 4 % cream Apply topically once " daily. 28 g 1    hydroxyurea (Hydrea) 500 mg capsule Take 1 capsule (500 mg total) by mouth twice a day.      inhaler, assist devices (E-Z SPACER MISC) 1 Units see administration instructions. USE WITH INHALER AS DIRECTED      spironolactone (Aldactone) 50 mg tablet Take 1 tablet (50 mg) by mouth once daily. 30 tablet 2    tretinoin (Retin-A) 0.05 % cream Apply topically once daily at bedtime. apply to face 45 g 5    triamcinolone (Kenalog) 0.1 % ointment Apply 1 Application topically if needed (APPLY ONE APPLICATION TOPICALLY ON ARMS/LEGS DAILY AS NEEDED FOR DRY AREAS AND FOR DARK BUMPS).      [DISCONTINUED] albuterol 90 mcg/actuation inhaler Inhale.      [DISCONTINUED] buPROPion SR (Wellbutrin SR) 100 mg 12 hr tablet Take 1 tablet (100 mg) by mouth once daily.      [DISCONTINUED] buPROPion SR (Wellbutrin SR) 150 mg 12 hr tablet Take 1 tablet (150 mg) by mouth 2 times a day. Do not crush, chew, or split. 60 tablet 1    [DISCONTINUED] buPROPion SR (Wellbutrin SR) 150 mg 12 hr tablet Take 1 tablet (150 mg) by mouth once daily.      [DISCONTINUED] buPROPion SR (Wellbutrin SR) 150 mg 12 hr tablet Take 1 tablet (150 mg) by mouth twice a day.      [DISCONTINUED] buPROPion SR (Wellbutrin SR) 150 mg 12 hr tablet Take 1 tablet (150 mg) by mouth once daily.      [DISCONTINUED] cetirizine (ZyrTEC) 10 mg tablet Take 1 tablet (10 mg) by mouth once daily as needed.      [DISCONTINUED] chlorhexidine (Peridex) 0.12 % solution Use 15 mL in the mouth or throat 2 times a day. Swish and spit with 15 mL for at least 30 seconds twice a day. Do not swallow.      [DISCONTINUED] ergocalciferol (Vitamin D-2) 1.25 MG (77021 UT) capsule Take by mouth.      [DISCONTINUED] fluticasone (Flonase) 50 mcg/actuation nasal spray Administer 2 sprays into affected nostril(s) once daily.      [DISCONTINUED] gabapentin (Neurontin) 100 mg capsule Take by mouth.      [DISCONTINUED] hydroxyurea (Hydrea) 500 mg capsule Take 4 capsules (2,000 mg total) by  mouth once daily.      [DISCONTINUED] lamoTRIgine (LaMICtal) 100 mg tablet Take 1 tablet (100 mg) by mouth once daily. 30 tablet 1    [DISCONTINUED] lamoTRIgine (LaMICtal) 25 mg tablet Take 1 tablet (25 mg) by mouth once daily.      [DISCONTINUED] magnesium oxide (Mag-Ox) 400 mg (241.3 mg magnesium) tablet Take 1 tablet (400 mg) by mouth if needed (Take 1 tablet on onset of aura and for one day afterward.).      [DISCONTINUED] magnesium oxide 400 mg magnesium capsule Take by mouth.      [DISCONTINUED] metFORMIN XR (Glucophage-XR) 500 mg 24 hr tablet Take 1 tablet (500 mg) by mouth once daily at noon. Take with meals.      [DISCONTINUED] metFORMIN XR (Glucophage-XR) 500 mg 24 hr tablet 2 tablets (1,000 mg) once daily at noon. Take with meals.      [DISCONTINUED] ondansetron ODT (Zofran-ODT) 4 mg disintegrating tablet Take 1 tablet (4 mg) by mouth every 8 hours if needed.      [DISCONTINUED] polyethylene glycol (Glycolax, Miralax) 17 gram/dose powder MIX ONE CAPFUL (17 GRAMS) IN 8 OUNCES OF LIQUID AND DRINK BY MOUTH ONCE DAILY AS NEEDED FOR CONSTIPATION 510 g 2    [DISCONTINUED] prazosin (Minipress) 1 mg capsule Take 1 capsule (1 mg) by mouth once daily at bedtime. 30 capsule 11    [DISCONTINUED] topiramate (Topamax) 25 mg tablet Take 1 tablet (25 mg) by mouth 2 times a day. TAKE 1 TABLET BY MOUTH AT BEDTIME, AFTER 2 WEEKS, IF TOLERATING WELL, INCREASE TO 2 TABLETS AT BEDTIME 60 tablet 1    [DISCONTINUED] topiramate (Topamax) 50 mg tablet Take 1 tablet (50 mg) by mouth once daily at bedtime.      [DISCONTINUED] ziprasidone (Geodon) 40 mg capsule Take 1 capsule (40 mg) by mouth once daily.       No current facility-administered medications on file prior to visit.       Time Spent:    Prep time: 3  Direct patient time: 35  Documentation time: 4   Total time: 42 min    Next Appointment:  Follow up in 5 weeks (on 12/1/2023).

## 2023-11-01 ENCOUNTER — CLINICAL SUPPORT (OUTPATIENT)
Dept: ALLERGY | Facility: HOSPITAL | Age: 38
End: 2023-11-01
Payer: COMMERCIAL

## 2023-11-01 VITALS
OXYGEN SATURATION: 99 % | DIASTOLIC BLOOD PRESSURE: 73 MMHG | HEART RATE: 82 BPM | SYSTOLIC BLOOD PRESSURE: 114 MMHG | RESPIRATION RATE: 18 BRPM | TEMPERATURE: 97.9 F

## 2023-11-01 DIAGNOSIS — J30.1 SEASONAL ALLERGIC RHINITIS DUE TO POLLEN: ICD-10-CM

## 2023-11-01 PROCEDURE — 95117 IMMUNOTHERAPY INJECTIONS: CPT | Performed by: ALLERGY & IMMUNOLOGY

## 2023-11-01 ASSESSMENT — PAIN SCALES - GENERAL: PAINLEVEL: 0-NO PAIN

## 2023-11-08 ENCOUNTER — DOCUMENTATION (OUTPATIENT)
Dept: UROLOGY | Facility: CLINIC | Age: 38
End: 2023-11-08

## 2023-11-08 ENCOUNTER — CLINICAL SUPPORT (OUTPATIENT)
Dept: ALLERGY | Facility: HOSPITAL | Age: 38
End: 2023-11-08
Payer: COMMERCIAL

## 2023-11-08 VITALS
OXYGEN SATURATION: 97 % | HEART RATE: 97 BPM | SYSTOLIC BLOOD PRESSURE: 118 MMHG | DIASTOLIC BLOOD PRESSURE: 77 MMHG | TEMPERATURE: 98 F

## 2023-11-08 DIAGNOSIS — J30.9 ALLERGIC RHINITIS, UNSPECIFIED SEASONALITY, UNSPECIFIED TRIGGER: ICD-10-CM

## 2023-11-08 PROCEDURE — 95117 IMMUNOTHERAPY INJECTIONS: CPT | Performed by: STUDENT IN AN ORGANIZED HEALTH CARE EDUCATION/TRAINING PROGRAM

## 2023-11-09 NOTE — PROGRESS NOTES
"In addition to [facility] message from 10/25/23 regarding dissatisfaction with surgical outcome, also received the following message from Veena:    \"I'm not waiting months for this procedure. I want him to remove this and reconstruct it back to a vagina. He took away sensitivity when it was suppose to be gained. He promised feeling would be half way. Nothing.  I shouldn't have to ask again. I won't ask again. We spoke on this in the last appointment, it's still leaking. I'm sick of the leaking for 6 months and flushing. I was not in for aesthetics but sensation and given no options outside of abdomen phalloplasty.\"    Advised that per Dr. Montes, as with first surgery, we will require clearance from mental health care providers to go forward.        "

## 2023-11-10 ENCOUNTER — TELEPHONE (OUTPATIENT)
Dept: HEMATOLOGY/ONCOLOGY | Facility: HOSPITAL | Age: 38
End: 2023-11-10
Payer: COMMERCIAL

## 2023-11-10 DIAGNOSIS — D47.3 ESSENTIAL THROMBOCYTHEMIA (MULTI): ICD-10-CM

## 2023-11-10 NOTE — TELEPHONE ENCOUNTER
RN called and spoke to patient. Lillian said they can come in on 11/30 (1030am) and will have enough medication until the appointment. RN advised patient to call if they have any questions or concerns prior to the appointment.

## 2023-11-10 NOTE — TELEPHONE ENCOUNTER
"----- Message from TRAVON Hudson sent at 11/10/2023  5:01 PM EST -----  Regarding: FW: It won't let me Schedule with you online  Contact: [phone]  Please have pt schedule fuv 11/30 --- is overdue for FUV and safety labs (is on Hydrea). Please find out how many pills she has left  ----- Message -----  From: Latonia Cook RN  Sent: 11/10/2023   2:09 PM EST  To: TRAVON Hudson  Subject: FW: It won't let me Schedule with you online       ----- Message -----  From: Lillian Manriquez \"Veena\"  Sent: 11/9/2023   6:16 PM EST  To: [facility] Rn Care Coordinator - Yoon  Subject: It won't let me Schedule with you online         I probably need refills on my Hydroxyurea and Gabapentin. I'll be rescheduling soon.      "

## 2023-11-15 ENCOUNTER — CLINICAL SUPPORT (OUTPATIENT)
Dept: ALLERGY | Facility: HOSPITAL | Age: 38
End: 2023-11-15
Payer: COMMERCIAL

## 2023-11-15 VITALS — HEART RATE: 94 BPM | DIASTOLIC BLOOD PRESSURE: 74 MMHG | SYSTOLIC BLOOD PRESSURE: 112 MMHG | TEMPERATURE: 97.6 F

## 2023-11-15 DIAGNOSIS — J30.1 SEASONAL ALLERGIC RHINITIS DUE TO POLLEN: ICD-10-CM

## 2023-11-15 PROCEDURE — 95117 IMMUNOTHERAPY INJECTIONS: CPT | Performed by: ALLERGY & IMMUNOLOGY

## 2023-11-15 NOTE — PROGRESS NOTES
Veena is here today for their regularly scheduled immunotherapy injection per protocol. Patient denies recent illness, delayed reaction after last injection, or asthma exacerbation. Patient reports taking antihistimine before today's visit. Lungs clear and equal bilaterally. Patient received their injection as recorded in flowsheet. Patient monitored for 30 minutes after injection and left the office at baseline state of health. Will continue allergy immunotherapy as planned. Patient instructed to call RN line if new symptoms or reaction from today's injection are noted.

## 2023-11-22 ENCOUNTER — CLINICAL SUPPORT (OUTPATIENT)
Dept: ALLERGY | Facility: HOSPITAL | Age: 38
End: 2023-11-22
Payer: COMMERCIAL

## 2023-11-22 VITALS
SYSTOLIC BLOOD PRESSURE: 115 MMHG | RESPIRATION RATE: 16 BRPM | HEART RATE: 79 BPM | TEMPERATURE: 98 F | OXYGEN SATURATION: 100 % | DIASTOLIC BLOOD PRESSURE: 76 MMHG

## 2023-11-22 DIAGNOSIS — J30.1 SEASONAL ALLERGIC RHINITIS DUE TO POLLEN: ICD-10-CM

## 2023-11-22 PROCEDURE — 95117 IMMUNOTHERAPY INJECTIONS: CPT | Performed by: ALLERGY & IMMUNOLOGY

## 2023-11-22 NOTE — PROGRESS NOTES
"Lillian here today for Lanea Veena Haru \"Veena\"'s regularly scheduled immunotherapy injection, per protocol. Patient in good state of health, received Lanea Veena Haru \"Veena\"'s shot as planned, as recorded in flowsheet for this encounter, waited for 30 minutes after Lanea Veena Haru \"Veena\"'s injection and left the office after that still at their baseline state of health. Will continue allergy immunotherapy as planned and call us in case any symptoms or reaction from today's injection are noted.    "

## 2023-11-29 ENCOUNTER — CLINICAL SUPPORT (OUTPATIENT)
Dept: ALLERGY | Facility: HOSPITAL | Age: 38
End: 2023-11-29
Payer: COMMERCIAL

## 2023-11-29 VITALS
TEMPERATURE: 98.1 F | DIASTOLIC BLOOD PRESSURE: 67 MMHG | OXYGEN SATURATION: 100 % | RESPIRATION RATE: 16 BRPM | SYSTOLIC BLOOD PRESSURE: 117 MMHG | HEART RATE: 85 BPM

## 2023-11-29 DIAGNOSIS — J30.1 SEASONAL ALLERGIC RHINITIS DUE TO POLLEN: ICD-10-CM

## 2023-11-29 PROCEDURE — 95117 IMMUNOTHERAPY INJECTIONS: CPT | Performed by: STUDENT IN AN ORGANIZED HEALTH CARE EDUCATION/TRAINING PROGRAM

## 2023-11-30 ENCOUNTER — OFFICE VISIT (OUTPATIENT)
Dept: HEMATOLOGY/ONCOLOGY | Facility: HOSPITAL | Age: 38
End: 2023-11-30
Payer: COMMERCIAL

## 2023-11-30 ENCOUNTER — LAB (OUTPATIENT)
Dept: LAB | Facility: HOSPITAL | Age: 38
End: 2023-11-30
Payer: COMMERCIAL

## 2023-11-30 DIAGNOSIS — D47.3 ESSENTIAL THROMBOCYTHEMIA (MULTI): Primary | ICD-10-CM

## 2023-11-30 DIAGNOSIS — D47.3 ESSENTIAL THROMBOCYTHEMIA (MULTI): ICD-10-CM

## 2023-11-30 LAB
ALBUMIN SERPL BCP-MCNC: 4.7 G/DL (ref 3.4–5)
ALP SERPL-CCNC: 92 U/L (ref 33–120)
ALT SERPL W P-5'-P-CCNC: 50 U/L (ref 7–52)
ANION GAP SERPL CALC-SCNC: 12 MMOL/L (ref 10–20)
AST SERPL W P-5'-P-CCNC: 25 U/L (ref 9–39)
BASOPHILS # BLD AUTO: 0.02 X10*3/UL (ref 0–0.1)
BASOPHILS NFR BLD AUTO: 0.5 %
BILIRUB SERPL-MCNC: 0.7 MG/DL (ref 0–1.2)
BUN SERPL-MCNC: 10 MG/DL (ref 6–23)
CALCIUM SERPL-MCNC: 10.1 MG/DL (ref 8.6–10.3)
CHLORIDE SERPL-SCNC: 103 MMOL/L (ref 98–107)
CO2 SERPL-SCNC: 27 MMOL/L (ref 21–32)
CREAT SERPL-MCNC: 1.04 MG/DL (ref 0.5–1.3)
EOSINOPHIL # BLD AUTO: 0.02 X10*3/UL (ref 0–0.7)
EOSINOPHIL NFR BLD AUTO: 0.5 %
ERYTHROCYTE [DISTWIDTH] IN BLOOD BY AUTOMATED COUNT: 12.2 % (ref 11.5–14.5)
FERRITIN SERPL-MCNC: 446 NG/ML (ref 8–300)
GFR SERPL CREATININE-BSD FRML MDRD: 70 ML/MIN/1.73M*2
GLUCOSE SERPL-MCNC: 87 MG/DL (ref 74–99)
HCT VFR BLD AUTO: 34.9 % (ref 36–52)
HGB BLD-MCNC: 12.2 G/DL (ref 12–17.5)
IMM GRANULOCYTES # BLD AUTO: 0.01 X10*3/UL (ref 0–0.7)
IMM GRANULOCYTES NFR BLD AUTO: 0.2 % (ref 0–0.9)
IRON SATN MFR SERPL: 43 % (ref 25–45)
IRON SERPL-MCNC: 126 UG/DL (ref 35–150)
LYMPHOCYTES # BLD AUTO: 1.83 X10*3/UL (ref 1.2–4.8)
LYMPHOCYTES NFR BLD AUTO: 42.1 %
MCH RBC QN AUTO: 37.7 PG (ref 26–34)
MCHC RBC AUTO-ENTMCNC: 35 G/DL (ref 32–36)
MCV RBC AUTO: 108 FL (ref 80–100)
MONOCYTES # BLD AUTO: 0.2 X10*3/UL (ref 0.1–1)
MONOCYTES NFR BLD AUTO: 4.6 %
NEUTROPHILS # BLD AUTO: 2.27 X10*3/UL (ref 1.2–7.7)
NEUTROPHILS NFR BLD AUTO: 52.1 %
NRBC BLD-RTO: 0 /100 WBCS (ref 0–0)
PLATELET # BLD AUTO: 363 X10*3/UL (ref 150–450)
POTASSIUM SERPL-SCNC: 3.9 MMOL/L (ref 3.5–5.3)
PROT SERPL-MCNC: 8 G/DL (ref 6.4–8.2)
RBC # BLD AUTO: 3.24 X10*6/UL (ref 4–5.9)
SODIUM SERPL-SCNC: 138 MMOL/L (ref 136–145)
TIBC SERPL-MCNC: 295 UG/DL (ref 240–445)
UIBC SERPL-MCNC: 169 UG/DL (ref 110–370)
WBC # BLD AUTO: 4.4 X10*3/UL (ref 4.4–11.3)

## 2023-11-30 PROCEDURE — 99214 OFFICE O/P EST MOD 30 MIN: CPT | Performed by: NURSE PRACTITIONER

## 2023-11-30 PROCEDURE — 83540 ASSAY OF IRON: CPT

## 2023-11-30 PROCEDURE — 85025 COMPLETE CBC W/AUTO DIFF WBC: CPT

## 2023-11-30 PROCEDURE — 1036F TOBACCO NON-USER: CPT | Performed by: NURSE PRACTITIONER

## 2023-11-30 PROCEDURE — 83550 IRON BINDING TEST: CPT

## 2023-11-30 PROCEDURE — 3008F BODY MASS INDEX DOCD: CPT | Performed by: NURSE PRACTITIONER

## 2023-11-30 PROCEDURE — 36415 COLL VENOUS BLD VENIPUNCTURE: CPT

## 2023-11-30 PROCEDURE — 82728 ASSAY OF FERRITIN: CPT

## 2023-11-30 PROCEDURE — 80053 COMPREHEN METABOLIC PANEL: CPT

## 2023-11-30 RX ORDER — ENOXAPARIN SODIUM 100 MG/ML
40 INJECTION SUBCUTANEOUS DAILY
Qty: 14 EACH | Refills: 0 | Status: SHIPPED | OUTPATIENT
Start: 2023-11-30 | End: 2023-12-14

## 2023-11-30 RX ORDER — HYDROXYUREA 500 MG/1
500 CAPSULE ORAL DAILY
Qty: 30 CAPSULE | Refills: 2 | Status: SHIPPED | OUTPATIENT
Start: 2023-11-30 | End: 2023-12-30

## 2023-11-30 RX ORDER — GABAPENTIN 300 MG/1
300 CAPSULE ORAL DAILY
Qty: 30 CAPSULE | Refills: 2 | Status: SHIPPED | OUTPATIENT
Start: 2023-11-30 | End: 2023-12-30

## 2023-11-30 ASSESSMENT — ENCOUNTER SYMPTOMS
LOSS OF SENSATION IN FEET: 0
DEPRESSION: 0
OCCASIONAL FEELINGS OF UNSTEADINESS: 0

## 2023-11-30 ASSESSMENT — PATIENT HEALTH QUESTIONNAIRE - PHQ9
10. IF YOU CHECKED OFF ANY PROBLEMS, HOW DIFFICULT HAVE THESE PROBLEMS MADE IT FOR YOU TO DO YOUR WORK, TAKE CARE OF THINGS AT HOME, OR GET ALONG WITH OTHER PEOPLE: SOMEWHAT DIFFICULT
2. FEELING DOWN, DEPRESSED OR HOPELESS: SEVERAL DAYS
1. LITTLE INTEREST OR PLEASURE IN DOING THINGS: NOT AT ALL
SUM OF ALL RESPONSES TO PHQ9 QUESTIONS 1 AND 2: 1

## 2023-11-30 ASSESSMENT — COLUMBIA-SUICIDE SEVERITY RATING SCALE - C-SSRS
1. IN THE PAST MONTH, HAVE YOU WISHED YOU WERE DEAD OR WISHED YOU COULD GO TO SLEEP AND NOT WAKE UP?: NO
6. HAVE YOU EVER DONE ANYTHING, STARTED TO DO ANYTHING, OR PREPARED TO DO ANYTHING TO END YOUR LIFE?: NO
2. HAVE YOU ACTUALLY HAD ANY THOUGHTS OF KILLING YOURSELF?: NO

## 2023-11-30 ASSESSMENT — PAIN SCALES - GENERAL: PAINLEVEL: 4

## 2023-11-30 NOTE — PROGRESS NOTES
"Visit Type: Benign Heme Follow-up      History of Present Illness:      ID Statement:    PATRICIO BANEGAS is a 37 year old Female        Chief Complaint: ET   Interval History:    Location:  [facility] [place] [facility]  Dx: JAK2+ ET  Tx: hydrea, asa     Patricio is a 36 y/o female assigned at birth (transgender non-binary who prefers \"they/them/their\" pronouns) presenting for follow-up of JAK2+ ET.      Presents today for followup. Lost to followup since last appt 5/2023.    Reports healing issues last 8mo from gender reassignment surgery - reports is leaking pus - is going to meet with a new surgeon and possibly get reverse surgery to fix the issues  Oct 1st-15th - went to [place] and was unable to take Hydrea then as it was confiscated and thrown out per pt. Took ASA while in [place] (only had 500mg capsules available there)  Taking Hydrea daily w/o issue - did not miss any other doses.  Has been taking Hydea 2 caps daily and ASA daily. SE hyperpigmentation. Reports chronic burning of feet and hands bilaterally had resolved overall - on gabapentin 300mg at bedtime  w/o issue but pain is back since out of gabapentin for the last 2mo.    DVT prophyllaxis periop and for 14 days post op with lovenox 40mg daily sc   Plan for surgery 1/31 at [facility] - liposuction and scar revision on breasts.      The patient has not noted fever, chills, drenching night sweats, infectious symptoms, weight loss, bone pain, hemorrhage from any site, melena, or respiratory symptoms. No new issues.      No personal history of stroke, MI, or clot.     Completed following surgeries:  hysterectomy (Dr. Galina Mccann) and mastectomy at [facility] with Dr. River Lackey, and SRS (sexual reassignment sx) - stage 1 ant abdominal phalloplasty 3/2022; stage 2a phalloplasty 9/2022; staged urethroplasty with buccal mucosal graft and preputial graft,  clitoral transposition, L clitoral nerve neurolysis and avance nerve grafting,  clarix interposition, complex wound closure w/ " tubularization of neophallus with Dr. Mario Montes on 5/10/23. Had recent emergency surgery- s/p laparoscopic R oophorectomy on 12/10/22 for ovarian torsion.       Review of Systems:   Review of Systems:    10 point review of systems negative except as state in HPI.         Allergies and Intolerances:       Allergies:         erythromycin: Drug, Other, Active       Intolerances:         hydrocodone: Drug, Nausea/Vomiting, Active     Outpatient Medication Profile:   * Patient Currently Takes Medications as of 25-May-2023 14:55 documented in Structured Notes         gabapentin 300 mg oral capsule : Last Dose Taken:  , 1 cap(s) oral once a day (at bedtime), Start Date: 25-May-2023         hydroxyurea 500 mg oral capsule: Last Dose Taken:  , 2 cap(s) oral once  a day, Start Date: 25-May-2023         aspirin 81 mg oral delayed release tablet: Last Dose Taken:  , 1 tab(s)  oral once a day, Start Date: 09-Feb-2023         Hair, Skin & Nails gummies: Last Dose Taken:  , 1 gummy orally once a  day         Multiple Vitamins with Minerals oral tablet: Last Dose Taken:  , 1 tab(s)  orally once a day         lamoTRIgine 100 mg oral tablet: Last Dose Taken:  , 1 tab(s) orally once  a day         cholecalciferol 50 mcg (2000 intl units) oral tablet: Last Dose Taken:   , 1 tab(s) orally once a day         topiramate 25 mg oral tablet: Last Dose Taken:  , 2 tab(s) orally once  a day (at bedtime)         spironolactone 50 mg oral tablet: Last Dose Taken:  , 1 tab(s) orally  once a day         prazosin 1 mg oral capsule: Last Dose Taken:  , 1 cap(s) orally once  a day (at bedtime)         buPROPion 150 mg/12 hours (SR) oral tablet, extended release: Last Dose  Taken:  , 1 tab(s) orally 2 times a day         clindamycin 1% topical lotion: Last Dose Taken:  , Apply topically to  affected area 2 times a day         Hydrocort 2.5% topical cream: Last Dose Taken:  , Apply topically to  affected area 3 times a day, As Needed          triamcinolone 0.1% topical ointment: Last Dose Taken:  , Apply topically  to affected area 3 times a day, As Needed dry areas/dark bumps         Tretinoin Emollient 0.05% topical cream: Last Dose Taken:  , Apply topically  to affected area once a day (at bedtime)        Family History: No Family History items are recorded  in the problem list.       Social History:   Social Substance History:  ·  Smoking Status never smoker (1)            Vitals and Measurements:   Vitals: Temp: 36.5  HR: 81  RR: 16  BP: 107/57  SPO2%:   100   Measurements: HT(cm): 169  WT(kg): 71.2  BSA: 1.82   BMI:  24.9   Last 3 Weights & Heights: Date:                           Weight/Scale Type:                    Height:   25-May-2023 11:32                71.2  kg                     169  cm  09-Feb-2023 09:13                78.3  kg                     169  cm  12-Jan-2023 08:37                77.7  kg                     169  cm         Physical Exam:      Constitutional: Well developed, awake/alert/oriented  x3, no distress, alert and cooperative   Eyes: clear sclera   ENMT: mucous membranes moist   Head/Neck: Neck supple, no apparent injury, trachea  midline   Respiratory/Thorax: Patent airways, CTAB, normal  breath sounds with good chest expansion, thorax symmetric   Cardiovascular: Regular, rate and rhythm, no murmurs,  normal S 1and S 2   Gastrointestinal: Abdomen soft nontender, nondistended,  + bowel sounds.   Musculoskeletal: ROM intact, normal strength   Extremities: normal extremities, no cyanosis, no  clubbing, no edema except inner L ankle nonpitting   Neurological: Patient is alert and oriented x3. Nonfocal  exam. No myoclonus   Lymphatic: No significant lymphadenopathy   Psychological: Pleasant, appropriate, and easily  engaged   Skin: Warm and dry. Hyperpigmentation            Lab Results:     ·  Results        CBC date/time       WBC     HGB     HCT     PLT     Neut      25-May-2023 11:18   4.3(L)  12.2    34.5(L)  422      2.20  09-Feb-2023 09:10   5.3     12.1    35.3(L) 434     3.01  12-Jan-2023 08:26   5.2     11.8(L) 33.4(L) 468(H)  2.22  11-Dec-2022 06:00   8.1     11.1(L) 32.4(L) 489(H)  N/A  10-Dec-2022 12:05   6.1     12.4    35.1(L) 536(H)  4.58  13-Oct-2022 13:55   5.2     12.3    34.2(L) 434     2.79  18-Aug-2022 12:36   5.9     13.0    37.2    516(H)  2.97     BMP date/time       NA              K               CL              CO2             BUN             CREAT             25-May-2023 11:18   139             4.1             105             N/A             12              1.09(H)  09-Feb-2023 09:10   140             3.9             106             N/A             8               1.00  12-Jan-2023 08:26   138             3.6             105             N/A             10              1.05  11-Dec-2022 06:00   137             3.8             103             N/A             10              1.07(H)  10-Dec-2022 12:05   138             4.4             103             N/A             10              1.14(H)  13-Oct-2022 13:55   136             3.9             101             N/A             12              1.03  18-Aug-2022 12:36   137             4.1             102             N/A             11              0.95     Hepatic date/time   T Pro   T Bili  AST     ALT     ALKP    ALB       10-Dec-2022 12:05   7.8     0.6     N/A     32      103     4.6  09-Mar-2017 14:48   8.8(H)  0.6     33      52(H)    107     5.3(H)  25-Mar-2010 18:44   7.7     0.6     13      32      75      4.4     LDH date/time       LDH     30-Nov-2017 09:21   N/A  30-Nov-2017 09:21   193  09-Nov-2017 11:59   210  09-Nov-2017 11:59   N/A  19-Mar-2014 13:36   N/A  19-Mar-2014 13:36   N/A        I have reviewed these laboratory results:     Comprehensive Metabolic Panel  25-May-2023 11:18:00       Result Value    Glucose, Serum  95    NA  139    K  4.1    CL  105    Bicarbonate, Serum  24    Anion Gap, Serum  14    BUN  12    CREAT  1.09   H   GFR Female   66    Calcium, Serum  9.9    ALB  4.6    ALKP  106    T Pro  8.0    T Bili  0.8    Alanine Aminotransferase, Serum  33    Aspartate Transaminase, Serum  24       Complete Blood Count + Differential  25-May-2023 11:18:00       Result Value    White Blood Cell Count  4.3   L   Red Blood Cell Count  3.21   L   HGB  12.2    HCT  34.5   L   MCV  107   H   MCHC  35.4    PLT  422    RDW-CV  11.6    Neutrophil %  50.9    Immature Granulocytes %  0.5    Lymphocyte %  40.3    Monocyte %  6.5    Eosinophil %  0.9    Basophil %  0.9    Neutrophil Count  2.20    Lymphocyte Count  1.74    Monocyte Count  0.28    Eosinophil Count  0.04    Basophil Count  0.04        Latest Reference Range & Units 11/30/23 11:05   GLUCOSE 74 - 99 mg/dL 87   SODIUM 136 - 145 mmol/L 138   POTASSIUM 3.5 - 5.3 mmol/L 3.9   CHLORIDE 98 - 107 mmol/L 103   Bicarbonate 21 - 32 mmol/L 27   Anion Gap 10 - 20 mmol/L 12   Blood Urea Nitrogen 6 - 23 mg/dL 10   Creatinine 0.50 - 1.30 mg/dL 1.04   EGFR >60 mL/min/1.73m*2 70   Calcium 8.6 - 10.3 mg/dL 10.1   Albumin 3.4 - 5.0 g/dL 4.7   Alkaline Phosphatase 33 - 120 U/L 92   ALT 7 - 52 U/L 50   AST 9 - 39 U/L 25   Bilirubin Total 0.0 - 1.2 mg/dL 0.7   FERRITIN 8 - 300 ng/mL 446 (H)   Total Protein 6.4 - 8.2 g/dL 8.0   IRON 35 - 150 ug/dL 126   TIBC 240 - 445 ug/dL 295   UIBC 110 - 370 ug/dL 169   % Saturation 25 - 45 % 43   WBC 4.4 - 11.3 x10*3/uL 4.4   nRBC 0.0 - 0.0 /100 WBCs 0.0   RBC 4.00 - 5.90 x10*6/uL 3.24 (L)   HEMOGLOBIN 12.0 - 17.5 g/dL 12.2   HEMATOCRIT 36.0 - 52.0 % 34.9 (L)   MCV 80 - 100 fL 108 (H)   MCH 26.0 - 34.0 pg 37.7 (H)   MCHC 32.0 - 36.0 g/dL 35.0   RED CELL DISTRIBUTION WIDTH 11.5 - 14.5 % 12.2   Platelets 150 - 450 x10*3/uL 363   Neutrophils % 40.0 - 80.0 % 52.1   Immature Granulocytes %, Automated 0.0 - 0.9 % 0.2   Lymphocytes % 13.0 - 44.0 % 42.1   Monocytes % 2.0 - 10.0 % 4.6   Eosinophils % 0.0 - 6.0 % 0.5   Basophils % 0.0 - 2.0 % 0.5   Neutrophils Absolute 1.20 - 7.70 x10*3/uL 2.27  "  Immature Granulocytes Absolute, Automated 0.00 - 0.70 x10*3/uL 0.01   Lymphocytes Absolute 1.20 - 4.80 x10*3/uL 1.83   Monocytes Absolute 0.10 - 1.00 x10*3/uL 0.20   Eosinophils Absolute 0.00 - 0.70 x10*3/uL 0.02   Basophils Absolute 0.00 - 0.10 x10*3/uL 0.02   (H): Data is abnormally high  (L): Data is abnormally low     Assessment and Plan:      Assessment and Plan:   Assessment:    Location:  [facility]  Dx: JAK2+ ET  Tx: hydrea, asa     Lillian is a 35 y/o female assigned at birth (transgender non-binary who prefers \"they/them/their\" pronouns) presenting for follow-up of JAK2+ ET.      Presents today for followup. Lost to followup since last appt 5/2023.  Taking hydrea 1000mg daily in addition to her daily asa. Known hyperpigmentation SE of hydrea. Chronic burning of feet bilaterally that had resolved with gabapentin and better control of ET but pt has been out of Gabapentin for the last couple months so SE has returned.      Lab results from today show plt count is normal at 363k, wbc line normal, hgb 12.2, mcv 108. CMP stable. We spoke extensively about our goal for plt count being less than or equal to 500k, the risks of clotting/CVA, and s/sx to be aware of related to  a blood clot. Discussed treatment may also improve paresthesia which it has.      - As plt count has decreased and she is having healing/infection issues s/p phalloplasty, we will decrease Hydrea and re-evaluate in 2 weeks    Will continue previous plans for surgery: DVT prophyllaxis periop and for 14 days post op with lovenox 40mg daily sc       Getting surgery [facility] - liposuction and scar revision  Plan for surgery 1/31, marking on 1/30     Plan:  1. Decrease hydrea 500mg daily. RX sent to pharmacy.   2. Continue gabapentin 300mg PO once day before bed. RX sent to pharmacy. OARRS reviewed and stable  3. fuv in 2wks - cbcd stat     I had an extensive discussion with the patient regarding the diagnosis and discussed the plan of therapy, " including general considerations regarding side effects and outcomes. Pt understood and gave appropriate teach back about the plan of care. All questions  were answered to the patient's satisfaction. The patient is instructed to contact us at any time if questions or problems arise. Thank you for the opportunity to participate in the care of this very pleasant patient.     Total time =30 minutes. 50% or more of this time was spent in counseling and/or coordination of care including reviewing medical history/radiology/labs, examining patient, formulating outlined plan with team, and discussing plan with patient/family.

## 2023-11-30 NOTE — PROGRESS NOTES
"Visit Type: Benign Heme Follow-up      History of Present Illness:      ID Statement:    PATRICIO BANEGAS is a 37 year old Female        Chief Complaint: ET   Interval History:    Location:  [facility]  Dx: JAK2+ ET  Tx: hydrea, asa     Patricio is a 38 y/o female assigned at birth (transgender non-binary who prefers \"they/them/their\" pronouns) presenting for follow-up of JAK2+ ET.      Presents today for followup.  Taking Hydrea daily w/o issue - did not miss any doses.  Has been taking Hydea 2 caps daily and ASA daily. SE hyperpigmentation. Reports chronic burning of feet bilaterally has resolved overall - on gabapentin 300mg at bedtime  w/o issue.      Recent rash after starting spirolactone to help decrease facial hair growth - is improving since d/c med.      The patient has not noted fever, chills, drenching night sweats, infectious symptoms, weight loss, bone pain, hemorrhage from any site, melena, or respiratory symptoms. No new issues.      No personal history of stroke, MI, or clot.     Completed following surgeries:  hysterectomy (Dr. Galina Mccann) and mastectomy at [facility] with Dr. River Lackey, and SRS (sexual reassignment sx) - stage 1 ant abdominal phalloplasty 3/2022; stage 2a phalloplasty 9/2022; staged urethroplasty with buccal mucosal graft and preputial graft,  clitoral transposition, L clitoral nerve neurolysis and avance nerve grafting,  clarix interposition, complex wound closure w/ tubularization of neophallus with Dr. Mario Montes on 5/10/23. Had recent emergency surgery- s/p laparoscopic R oophorectomy on 12/10/22 for ovarian torsion.                 Review of Systems:   Review of Systems:    10 point review of systems negative except as state in HPI.         Allergies and Intolerances:       Allergies:         erythromycin: Drug, Other, Active       Intolerances:         hydrocodone: Drug, Nausea/Vomiting, Active     Outpatient Medication Profile:   * Patient Currently Takes Medications " as of 25-May-2023 14:55 documented in Structured Notes         gabapentin 300 mg oral capsule : Last Dose Taken:  , 1 cap(s) oral once a day (at bedtime), Start Date: 25-May-2023         hydroxyurea 500 mg oral capsule: Last Dose Taken:  , 2 cap(s) oral once  a day, Start Date: 25-May-2023         aspirin 81 mg oral delayed release tablet: Last Dose Taken:  , 1 tab(s)  oral once a day, Start Date: 09-Feb-2023         Hair, Skin & Nails gummies: Last Dose Taken:  , 1 gummy orally once a  day         Multiple Vitamins with Minerals oral tablet: Last Dose Taken:  , 1 tab(s)  orally once a day         lamoTRIgine 100 mg oral tablet: Last Dose Taken:  , 1 tab(s) orally once  a day         cholecalciferol 50 mcg (2000 intl units) oral tablet: Last Dose Taken:   , 1 tab(s) orally once a day         topiramate 25 mg oral tablet: Last Dose Taken:  , 2 tab(s) orally once  a day (at bedtime)         spironolactone 50 mg oral tablet: Last Dose Taken:  , 1 tab(s) orally  once a day         prazosin 1 mg oral capsule: Last Dose Taken:  , 1 cap(s) orally once  a day (at bedtime)         buPROPion 150 mg/12 hours (SR) oral tablet, extended release: Last Dose  Taken:  , 1 tab(s) orally 2 times a day         clindamycin 1% topical lotion: Last Dose Taken:  , Apply topically to  affected area 2 times a day         Hydrocort 2.5% topical cream: Last Dose Taken:  , Apply topically to  affected area 3 times a day, As Needed         triamcinolone 0.1% topical ointment: Last Dose Taken:  , Apply topically  to affected area 3 times a day, As Needed dry areas/dark bumps         Tretinoin Emollient 0.05% topical cream: Last Dose Taken:  , Apply topically  to affected area once a day (at bedtime)        Family History: No Family History items are recorded  in the problem list.       Social History:   Social Substance History:  ·  Smoking Status never smoker (1)            Vitals and Measurements:   Vitals: Temp: 36.5  HR: 81  RR: 16  BP:  107/57  SPO2%:   100   Measurements: HT(cm): 169  WT(kg): 71.2  BSA: 1.82   BMI:  24.9   Last 3 Weights & Heights: Date:                           Weight/Scale Type:                    Height:   25-May-2023 11:32                71.2  kg                     169  cm  09-Feb-2023 09:13                78.3  kg                     169  cm  12-Jan-2023 08:37                77.7  kg                     169  cm         Physical Exam:      Constitutional: Well developed, awake/alert/oriented  x3, no distress, alert and cooperative   Eyes: clear sclera   ENMT: mucous membranes moist   Head/Neck: Neck supple, no apparent injury, trachea  midline   Respiratory/Thorax: Patent airways, CTAB, normal  breath sounds with good chest expansion, thorax symmetric   Cardiovascular: Regular, rate and rhythm, no murmurs,  normal S 1and S 2   Gastrointestinal: Abdomen soft nontender, nondistended,  + bowel sounds.   Musculoskeletal: ROM intact, normal strength   Extremities: normal extremities, no cyanosis, no  clubbing, no edema except inner L ankle nonpitting   Neurological: Patient is alert and oriented x3. Nonfocal  exam. No myoclonus   Lymphatic: No significant lymphadenopathy   Psychological: Pleasant, appropriate, and easily  engaged   Skin: Warm and dry. Hyperpigmentation            Lab Results:     ·  Results        CBC date/time       WBC     HGB     HCT     PLT     Neut      25-May-2023 11:18   4.3(L)  12.2    34.5(L)  422     2.20  09-Feb-2023 09:10   5.3     12.1    35.3(L) 434     3.01  12-Jan-2023 08:26   5.2     11.8(L) 33.4(L) 468(H)  2.22  11-Dec-2022 06:00   8.1     11.1(L) 32.4(L) 489(H)  N/A  10-Dec-2022 12:05   6.1     12.4    35.1(L) 536(H)  4.58  13-Oct-2022 13:55   5.2     12.3    34.2(L) 434     2.79  18-Aug-2022 12:36   5.9     13.0    37.2    516(H)  2.97     BMP date/time       NA              K               CL              CO2             BUN             CREAT             25-May-2023 11:18   139              4.1             105             N/A             12              1.09(H)  09-Feb-2023 09:10   140             3.9             106             N/A             8               1.00  12-Jan-2023 08:26   138             3.6             105             N/A             10              1.05  11-Dec-2022 06:00   137             3.8             103             N/A             10              1.07(H)  10-Dec-2022 12:05   138             4.4             103             N/A             10              1.14(H)  13-Oct-2022 13:55   136             3.9             101             N/A             12              1.03  18-Aug-2022 12:36   137             4.1             102             N/A             11              0.95     Hepatic date/time   T Pro   T Bili  AST     ALT     ALKP    ALB       10-Dec-2022 12:05   7.8     0.6     N/A     32      103     4.6  09-Mar-2017 14:48   8.8(H)  0.6     33      52(H)    107     5.3(H)  25-Mar-2010 18:44   7.7     0.6     13      32      75      4.4     LDH date/time       LDH     30-Nov-2017 09:21   N/A  30-Nov-2017 09:21   193  09-Nov-2017 11:59   210  09-Nov-2017 11:59   N/A  19-Mar-2014 13:36   N/A  19-Mar-2014 13:36   N/A        I have reviewed these laboratory results:     Comprehensive Metabolic Panel  25-May-2023 11:18:00       Result Value    Glucose, Serum  95    NA  139    K  4.1    CL  105    Bicarbonate, Serum  24    Anion Gap, Serum  14    BUN  12    CREAT  1.09   H   GFR Female  66    Calcium, Serum  9.9    ALB  4.6    ALKP  106    T Pro  8.0    T Bili  0.8    Alanine Aminotransferase, Serum  33    Aspartate Transaminase, Serum  24       Complete Blood Count + Differential  25-May-2023 11:18:00       Result Value    White Blood Cell Count  4.3   L   Red Blood Cell Count  3.21   L   HGB  12.2    HCT  34.5   L   MCV  107   H   MCHC  35.4    PLT  422    RDW-CV  11.6    Neutrophil %  50.9    Immature Granulocytes %  0.5    Lymphocyte %  40.3    Monocyte %  6.5    Eosinophil %  0.9   "  Basophil %  0.9    Neutrophil Count  2.20    Lymphocyte Count  1.74    Monocyte Count  0.28    Eosinophil Count  0.04    Basophil Count  0.04          Assessment and Plan:      Assessment and Plan:   Assessment:    Location:  [facility]  Dx: JAK2+ ET  Tx: hydrea, asa     Lillian is a 35 y/o female assigned at birth (transgender non-binary who prefers \"they/them/their\" pronouns) presenting for follow-up of JAK2+ ET.      Has been better with compliance - denies missing any doses - taking hydrea 1000mg daily in addition to her daily asa. Known hyperpigmentation SE of hydrea. Chronic burning of feet bilaterally that has resolved with gabapentin and better control of ET.      Lab results from today show plt count is normal at 422k, wbc line normal, hgb 12.2, mcv 107. CMP stable. We spoke extensively about our goal for plt count being less than or equal to 500k, the risks of clotting/CVA, and s/sx to be aware of related to  a blood clot. Discussed treatment may also improve paresthesia which it has.      - Oarrs reviewed and is stable     Plan:  1. Continue hydrea 1000mg daily. RX sent to pharmacy.   2. Continue gabapentin 300mg PO once day before bed. RX sent to pharmacy. OARRS reviewed and stable  3[place] [facility] [place] drawn before appt- cbc with diff, cmp.      I had an extensive discussion with the patient regarding the diagnosis and discussed the plan of therapy, including general considerations regarding side effects and outcomes. Pt understood and gave appropriate teach back about the plan of care. All questions  were answered to the patient's satisfaction. The patient is instructed to contact us at any time if questions or problems arise. Thank you for the opportunity to participate in the care of this very pleasant patient.     Total time =30 minutes. 50% or more of this time was spent in counseling and/or coordination of care including reviewing medical history/radiology/labs, " examining patient, formulating outlined plan with team, and discussing plan with patient/family.

## 2023-12-01 ENCOUNTER — TELEMEDICINE (OUTPATIENT)
Dept: BEHAVIORAL HEALTH | Facility: CLINIC | Age: 38
End: 2023-12-01
Payer: COMMERCIAL

## 2023-12-01 VITALS
TEMPERATURE: 97.3 F | SYSTOLIC BLOOD PRESSURE: 114 MMHG | HEIGHT: 66 IN | OXYGEN SATURATION: 100 % | RESPIRATION RATE: 16 BRPM | DIASTOLIC BLOOD PRESSURE: 72 MMHG | BODY MASS INDEX: 25.34 KG/M2 | HEART RATE: 95 BPM | WEIGHT: 157.7 LBS

## 2023-12-01 DIAGNOSIS — F43.9 TRAUMA AND STRESSOR-RELATED DISORDER: ICD-10-CM

## 2023-12-01 DIAGNOSIS — F31.9 BIPOLAR 1 DISORDER (MULTI): Primary | ICD-10-CM

## 2023-12-01 DIAGNOSIS — G47.00 INSOMNIA, UNSPECIFIED TYPE: ICD-10-CM

## 2023-12-01 PROCEDURE — 99214 OFFICE O/P EST MOD 30 MIN: CPT | Performed by: PSYCHIATRY & NEUROLOGY

## 2023-12-01 RX ORDER — MELATONIN 5 MG
5 CAPSULE ORAL NIGHTLY PRN
Qty: 30 CAPSULE | Refills: 1 | Status: SHIPPED | OUTPATIENT
Start: 2023-12-01 | End: 2024-01-26 | Stop reason: SDUPTHER

## 2023-12-01 ASSESSMENT — ENCOUNTER SYMPTOMS
DYSPHORIC MOOD: 1
SLEEP DISTURBANCE: 1
NERVOUS/ANXIOUS: 0

## 2023-12-01 NOTE — PROGRESS NOTES
"Adult Ambulatory Psychiatry Progress Note      Assessment/Plan     Impression:  Lillian Manriquez \"Shakeel" is a 38 y.o. nonbinary domiciled alone, employed at car [employer] who presents for follow up with CC of Bipolar and PTSD (Post-Traumatic Stress Disorder) Surgical dissatisfaction is a previously unvocalized complaint. Pt is addressing shortly after recent decompensation. Recommend continuing psychiatric medication and improving sleep management in effort to stabilize mood before considering further surgery.     Plan:      Bipolar d/o - lamotrigine 100mg daily, bupropion SR 150mg daily  Trauma/anxiety -escitalopram 10mg daily, c/w med ed, psycho ed, supportive psychotherapy to build therapeutic rapport, f/u 2 months  Insomnia - start melatonin 3mg at bedtime prn      Subjective   HPI:  Pt arrived on time. Mood \"a rollercoaster\". He's still having mood swings but they're not as severe. They've been feeling hypomanic. They're only sleeping about 4 hours of sleep but also feeling depressed. They feel tired but can't sleep. The nightmares are gone. They had SI a week ago. Denies any self harm. They're getting fewer hours at work and looking for something else. They are upset with the outcome of both their top and bottom surgeries. Suggested discussing further at next session.           Review of Systems   Psychiatric/Behavioral:  Positive for dysphoric mood and sleep disturbance. Negative for suicidal ideas. The patient is not nervous/anxious.          Objective   Mental Status Exam:  General Appearance: Well groomed, appropriate eye contact  Attitude/Behavior: Cooperative  Motor: No psychomotor agitation or retardation, no tremor or other abnormal movements  Speech: Normal rate, volume, prosody  Mood: \"a rollercoaster\"  Affect: Constricted  Thought Process: Linear, goal directed  Thought Associations: No loosening of associations  Thought Content: Normal  Perception: No perceptual abnormalities noted  Insight: " Intact  Judgement: Intact    Vitals:  There were no vitals filed for this visit.    Current Medications:  Current Outpatient Medications on File Prior to Visit   Medication Sig Dispense Refill    acetaminophen (Tylenol) 500 mg tablet Take 1-2 tablets (500-1,000 mg) by mouth every 6 hours if needed for mild pain (1 - 3).      albuterol 90 mcg/actuation inhaler Inhale 2 puffs see administration instructions. EVERY 4-6 HOURS AS NEEDED.      aspirin 81 mg EC tablet Take 1 tablet (81 mg) by mouth 1 time.      buPROPion SR (Wellbutrin SR) 150 mg 12 hr tablet Take 1 tablet (150 mg) by mouth once daily. Do not crush, chew, or split. 30 tablet 2    cholecalciferol (Vitamin D-3) 25 MCG (1000 UT) tablet Take 2 tablets (2,000 Units) by mouth once daily.      clindamycin (Cleocin T) 1 % lotion Apply topically once daily. 60 mL 2    clobetasol (Temovate) 0.05 % ointment Apply 1 Application topically if needed. APPLY TO MOSQUITO BITES AS NEEDED      docusate sodium (Colace) 100 mg capsule Take 1 capsule (100 mg) by mouth twice a day.      enoxaparin (Lovenox) 40 mg/0.4 mL syringe Inject 0.4 mL (40 mg) under the skin once daily for 14 days. 14 days post-op 14 each 0    escitalopram (Lexapro) 10 mg tablet Take 1 tablet (10 mg) by mouth once daily. 30 tablet 2    fexofenadine (Allegra) 180 mg tablet Take 1 tablet (180 mg) by mouth once daily.      fluticasone (Flonase) 50 mcg/actuation nasal spray 1 spray by Does not apply route once daily.      gabapentin (Neurontin) 300 mg capsule Take 1 capsule (300 mg) by mouth once daily. 30 capsule 2    hydrocortisone 2.5 % cream Apply topically once daily. 30 g 5    hydroquinone 4 % cream Apply topically once daily. 28 g 1    hydroxyurea (Hydrea) 500 mg capsule Take 1 capsule (500 mg total) by mouth once daily. 30 capsule 2    inhaler, assist devices (E-Z SPACER MISC) 1 Units see administration instructions. USE WITH INHALER AS DIRECTED      lamoTRIgine (LaMICtal) 100 mg tablet Take 1 tablet  (100 mg) by mouth once daily. 30 tablet 2    prazosin (Minipress) 1 mg capsule Take 1 capsule (1 mg) by mouth once daily at bedtime. 30 capsule 2    spironolactone (Aldactone) 50 mg tablet Take 1 tablet (50 mg) by mouth once daily. 30 tablet 2    topiramate (Topamax) 50 mg tablet Take 1 tablet (50 mg) by mouth once daily at bedtime. 30 tablet 2    tretinoin (Retin-A) 0.05 % cream Apply topically once daily at bedtime. apply to face 45 g 5    triamcinolone (Kenalog) 0.1 % ointment Apply 1 Application topically if needed (APPLY ONE APPLICATION TOPICALLY ON ARMS/LEGS DAILY AS NEEDED FOR DRY AREAS AND FOR DARK BUMPS).      [DISCONTINUED] gabapentin (Neurontin) 300 mg capsule Take 1 capsule (300 mg) by mouth once daily.      [DISCONTINUED] hydroxyurea (Hydrea) 500 mg capsule Take 1 capsule (500 mg total) by mouth twice a day.       No current facility-administered medications on file prior to visit.       Lab Review:   Lab on 11/30/2023   Component Date Value    WBC 11/30/2023 4.4     nRBC 11/30/2023 0.0     RBC 11/30/2023 3.24 (L)     Hemoglobin 11/30/2023 12.2     Hematocrit 11/30/2023 34.9 (L)     MCV 11/30/2023 108 (H)     MCH 11/30/2023 37.7 (H)     MCHC 11/30/2023 35.0     RDW 11/30/2023 12.2     Platelets 11/30/2023 363     Neutrophils % 11/30/2023 52.1     Immature Granulocytes %,* 11/30/2023 0.2     Lymphocytes % 11/30/2023 42.1     Monocytes % 11/30/2023 4.6     Eosinophils % 11/30/2023 0.5     Basophils % 11/30/2023 0.5     Neutrophils Absolute 11/30/2023 2.27     Immature Granulocytes Ab* 11/30/2023 0.01     Lymphocytes Absolute 11/30/2023 1.83     Monocytes Absolute 11/30/2023 0.20     Eosinophils Absolute 11/30/2023 0.02     Basophils Absolute 11/30/2023 0.02     Glucose 11/30/2023 87     Sodium 11/30/2023 138     Potassium 11/30/2023 3.9     Chloride 11/30/2023 103     Bicarbonate 11/30/2023 27     Anion Gap 11/30/2023 12     Urea Nitrogen 11/30/2023 10     Creatinine 11/30/2023 1.04     eGFR 11/30/2023 70      Calcium 11/30/2023 10.1     Albumin 11/30/2023 4.7     Alkaline Phosphatase 11/30/2023 92     Total Protein 11/30/2023 8.0     AST 11/30/2023 25     Bilirubin, Total 11/30/2023 0.7     ALT 11/30/2023 50     Iron 11/30/2023 126     UIBC 11/30/2023 169     TIBC 11/30/2023 295     % Saturation 11/30/2023 43     Ferritin 11/30/2023 446 (H)        Orders:  Diagnoses and all orders for this visit:  Bipolar 1 disorder (CMS/HCC)  Insomnia, unspecified type  -     melatonin 5 mg capsule; Take 1 capsule (5 mg) by mouth as needed at bedtime (insomnia).  Trauma and stressor-related disorder          Time Spent:    Prep time: 4  Direct patient time: 22  Documentation time: 4  Total time: 30 min    Next Appointment:  Follow up in 8 weeks (on 1/26/2024).

## 2023-12-06 ENCOUNTER — CLINICAL SUPPORT (OUTPATIENT)
Dept: ALLERGY | Facility: HOSPITAL | Age: 38
End: 2023-12-06
Payer: COMMERCIAL

## 2023-12-06 VITALS
TEMPERATURE: 98.1 F | OXYGEN SATURATION: 99 % | SYSTOLIC BLOOD PRESSURE: 124 MMHG | HEART RATE: 106 BPM | DIASTOLIC BLOOD PRESSURE: 78 MMHG

## 2023-12-06 DIAGNOSIS — J30.89 ALLERGIC RHINITIS DUE TO MOLD: ICD-10-CM

## 2023-12-06 DIAGNOSIS — J30.9 CHRONIC ALLERGIC RHINITIS: ICD-10-CM

## 2023-12-06 PROCEDURE — 95117 IMMUNOTHERAPY INJECTIONS: CPT | Performed by: ALLERGY & IMMUNOLOGY

## 2023-12-06 PROCEDURE — INJT2 ALLERGY IMMUNOTHERAPY 2+ INJECTIONS: Performed by: ALLERGY & IMMUNOLOGY

## 2023-12-07 ENCOUNTER — OFFICE VISIT (OUTPATIENT)
Dept: UROLOGY | Facility: CLINIC | Age: 38
End: 2023-12-07
Payer: COMMERCIAL

## 2023-12-07 VITALS
HEART RATE: 78 BPM | WEIGHT: 164.6 LBS | TEMPERATURE: 97.3 F | DIASTOLIC BLOOD PRESSURE: 77 MMHG | BODY MASS INDEX: 26.45 KG/M2 | SYSTOLIC BLOOD PRESSURE: 117 MMHG | HEIGHT: 66 IN | RESPIRATION RATE: 18 BRPM

## 2023-12-07 DIAGNOSIS — Z48.89 POSTOPERATIVE VISIT: ICD-10-CM

## 2023-12-07 DIAGNOSIS — N20.1 CALCULUS OF URETER: ICD-10-CM

## 2023-12-07 DIAGNOSIS — F64.9 GENDER DYSPHORIA: Primary | ICD-10-CM

## 2023-12-07 DIAGNOSIS — T14.8XXA DISCHARGE FROM WOUND: ICD-10-CM

## 2023-12-07 LAB
POC APPEARANCE, URINE: CLEAR
POC BILIRUBIN, URINE: NEGATIVE
POC BLOOD, URINE: NEGATIVE
POC COLOR, URINE: YELLOW
POC GLUCOSE, URINE: NEGATIVE MG/DL
POC KETONES, URINE: NEGATIVE MG/DL
POC LEUKOCYTES, URINE: ABNORMAL
POC NITRITE,URINE: NEGATIVE
POC PH, URINE: 5.5 PH
POC PROTEIN, URINE: NEGATIVE MG/DL
POC SPECIFIC GRAVITY, URINE: 1.02
POC UROBILINOGEN, URINE: 0.2 EU/DL

## 2023-12-07 PROCEDURE — 87070 CULTURE OTHR SPECIMN AEROBIC: CPT

## 2023-12-07 PROCEDURE — 1036F TOBACCO NON-USER: CPT | Performed by: OBSTETRICS & GYNECOLOGY

## 2023-12-07 PROCEDURE — 3008F BODY MASS INDEX DOCD: CPT | Performed by: OBSTETRICS & GYNECOLOGY

## 2023-12-07 PROCEDURE — 81003 URINALYSIS AUTO W/O SCOPE: CPT | Performed by: OBSTETRICS & GYNECOLOGY

## 2023-12-07 PROCEDURE — 87075 CULTR BACTERIA EXCEPT BLOOD: CPT

## 2023-12-07 PROCEDURE — 99215 OFFICE O/P EST HI 40 MIN: CPT | Performed by: OBSTETRICS & GYNECOLOGY

## 2023-12-07 PROCEDURE — 87185 SC STD ENZYME DETCJ PER NZM: CPT

## 2023-12-07 ASSESSMENT — PAIN SCALES - GENERAL: PAINLEVEL: 0-NO PAIN

## 2023-12-07 NOTE — PROGRESS NOTES
"FOLLOW-UP VISIT     PROBLEM LIST:  1. Gender dysphoria       HISTORY OF PRESENT ILLNESS:   Lillian Manriquez is a 38 y.o. adult who is here for evaluation of concerns of phalloplasty. They are unhappy with their phalloplasty. They report experiencing no sensation from the phallus or clitoris. They state, \"I would rather not have this limp thing here.\"   If there were sensation, it would be different, but they are just frustrated that there is not. They were aware that it might not have full sesnsation, but they were under the impression that there would be sensation to half of the length.  Additionally, there is a discharge from the tip that is very bothersome to them.    They are currently in a relationship with a man. They would like to have vaginal intercourse, but it has been very painful and not possible. This is contributing to the overall frustrating.      PAST MEDICAL HISTORY:  Past Medical History:   Diagnosis Date    Acute upper respiratory infection, unspecified 02/24/2020    Acute URI    Encounter for examination of ears and hearing without abnormal findings 10/12/2015    Examination of ears and hearing    Flat foot (pes planus) (acquired), right foot 09/19/2017    Flat feet    Personal history of other (healed) physical injury and trauma 08/29/2018    History of contusion    Personal history of other diseases of the digestive system     History of gastroesophageal reflux (GERD)    Personal history of other diseases of the digestive system 12/18/2019    History of gastroesophageal reflux (GERD)    Personal history of other diseases of the female genital tract 10/25/2016    History of ovarian cyst    Personal history of other diseases of the respiratory system 02/24/2020    History of sore throat    Personal history of other diseases of the respiratory system 08/04/2020    History of tonsillitis    Personal history of other mental and behavioral disorders     History of depression    Personal history of " "other specified conditions 07/19/2020    History of fever       PAST SURGICAL HISTORY:  Past Surgical History:   Procedure Laterality Date    OTHER SURGICAL HISTORY  01/16/2019    Leg surgery        ALLERGIES:   Allergies   Allergen Reactions    Lactose Diarrhea     \"mouth foams up\"    Erythromycin Unknown and Other     extreme stomach cramps. N/V    Stomach cramps    Hydrocodone-Acetaminophen Itching, Rash and Hives        MEDICATIONS:   [unfilled]     SOCIAL HISTORY:  Patient  reports that Lanea R. Haru \"Veena\" has never smoked. Lanea R. Haru \"Veena\" has never used smokeless tobacco. Lanea R. Haru \"Veena\" reports that Lanea R. Haru \"Veena\" does not drink alcohol and does not use drugs.   Social History     Socioeconomic History    Marital status: Single     Spouse name: Not on file    Number of children: Not on file    Years of education: Not on file    Highest education level: Not on file   Occupational History    Not on file   Tobacco Use    Smoking status: Never    Smokeless tobacco: Never   Substance and Sexual Activity    Alcohol use: Never    Drug use: Never    Sexual activity: Not on file   Other Topics Concern    Not on file   Social History Narrative    Not on file     Social Determinants of Health     Financial Resource Strain: Not on file   Food Insecurity: Not on file   Transportation Needs: Not on file   Physical Activity: Not on file   Stress: Not on file   Social Connections: Not on file   Intimate Partner Violence: Not on file   Housing Stability: Not on file       FAMILY HISTORY:  Family History   Problem Relation Name Age of Onset    Diabetes Mother      Heart failure Mother      Hypertension Mother      Schizophrenia Mother      COPD Father         REVIEW OF SYSTEMS:  Constitutional: Negative for fever and chills. Denies anorexia, weight loss.  Eyes: Negative for visual disturbance.   Respiratory: Negative for shortness of breath.    Cardiovascular: Negative for chest pain.   Gastrointestinal: " "Negative for nausea and vomiting.   Genitourinary: See interval history above.  Skin: Negative for rash.   Neurological: Negative for dizziness and numbness.   Psychiatric/Behavioral: Negative for confusion and decreased concentration.     PHYSICAL EXAM:  Blood pressure 117/77, pulse 78, temperature 36.3 °C (97.3 °F), resp. rate 18, height 1.676 m (5' 6\"), weight 74.7 kg (164 lb 9.6 oz).  Constitutional: Patient appears well-developed and well-nourished. No distress.    Head: Normocephalic and atraumatic.    Neck: Normal range of motion.    Cardiovascular: Normal rate.    Pulmonary/Chest: Effort normal. No respiratory distress.     : healthy appearing neophallus, overall has healed extremely well  White discharge is expressable, but not spontanesouly discharging  No open wounds  Clitoris pulled anteriorly  Labia normal  Vaginal introitus is narrow    Musculoskeletal: Normal range of motion.    Neurological: Alert and oriented to person, place, and time.  Psychiatric: Normal mood and affect. Behavior is normal. Thought content normal.      Assessment:      1. Gender dysphoria  Referral to Urology    Referral to Urology    Referral to Reproductive Endocrinology      2. Discharge from wound  Tissue/Wound Culture/Smear      3. Calculus of ureter  POCT UA Automated manually resulted      4. Postoperative visit            Lillian Manriquez is a 38 y.o. adult with concerns of phalloplasty.     Plan:    They have been having a very difficult time since surgery. Main issue is lack of sensation, which is understandably frustrating. We discussed this is a risk with all surgery but of course not the desired outcome for surgeon or patient.   We discussed options for working through this including, using a vaginal dilator to make vaginal intercourse more comfortable. Could also benefit from PFPT in the future.  I will put in referrals for Dr. Leonardo in psychology for sexual therapy and Dr. Reaves to discuss removing the phallus. " I will also refer them to reproductive endocrinology for oocyte preservation consultation, which they are interested in discussing with a specialist.     Galina Mccann MD MPH       Scribe Attestation  By signing my name below, I, Shen Smith   attest that this documentation has been prepared under the direction and in the presence of Galina Mccann MD MPH.

## 2023-12-08 ENCOUNTER — CLINICAL SUPPORT (OUTPATIENT)
Dept: UROLOGY | Facility: CLINIC | Age: 38
End: 2023-12-08
Payer: COMMERCIAL

## 2023-12-08 DIAGNOSIS — F64.9 GENDER DYSPHORIA: ICD-10-CM

## 2023-12-08 PROCEDURE — 90791 PSYCH DIAGNOSTIC EVALUATION: CPT | Performed by: PSYCHOLOGIST

## 2023-12-10 LAB
B-LACTAMASE ORGANISM ISLT: POSITIVE
BACTERIA SPEC CULT: ABNORMAL
BACTERIA SPEC CULT: ABNORMAL
GRAM STN SPEC: ABNORMAL
GRAM STN SPEC: ABNORMAL

## 2023-12-11 DIAGNOSIS — T14.8XXA WOUND INFECTION: ICD-10-CM

## 2023-12-11 DIAGNOSIS — L08.9 WOUND INFECTION: ICD-10-CM

## 2023-12-11 RX ORDER — AMOXICILLIN AND CLAVULANATE POTASSIUM 500; 125 MG/1; MG/1
500 TABLET, FILM COATED ORAL 2 TIMES DAILY
Qty: 14 TABLET | Refills: 0 | Status: CANCELLED | OUTPATIENT
Start: 2023-12-11 | End: 2023-12-18

## 2023-12-11 RX ORDER — CLINDAMYCIN HYDROCHLORIDE 300 MG/1
300 CAPSULE ORAL 2 TIMES DAILY
Qty: 14 CAPSULE | Refills: 0 | Status: SHIPPED | OUTPATIENT
Start: 2023-12-11 | End: 2023-12-18

## 2023-12-13 ENCOUNTER — CLINICAL SUPPORT (OUTPATIENT)
Dept: ALLERGY | Facility: HOSPITAL | Age: 38
End: 2023-12-13
Payer: COMMERCIAL

## 2023-12-13 VITALS
TEMPERATURE: 98.4 F | RESPIRATION RATE: 20 BRPM | HEART RATE: 80 BPM | DIASTOLIC BLOOD PRESSURE: 72 MMHG | SYSTOLIC BLOOD PRESSURE: 114 MMHG

## 2023-12-13 DIAGNOSIS — J30.9 ALLERGIC RHINITIS, UNSPECIFIED SEASONALITY, UNSPECIFIED TRIGGER: ICD-10-CM

## 2023-12-13 PROCEDURE — 95117 IMMUNOTHERAPY INJECTIONS: CPT | Performed by: ALLERGY & IMMUNOLOGY

## 2023-12-14 ENCOUNTER — APPOINTMENT (OUTPATIENT)
Dept: UROLOGY | Facility: CLINIC | Age: 38
End: 2023-12-14
Payer: COMMERCIAL

## 2023-12-14 ENCOUNTER — LAB (OUTPATIENT)
Dept: LAB | Facility: HOSPITAL | Age: 38
End: 2023-12-14
Payer: COMMERCIAL

## 2023-12-14 ENCOUNTER — OFFICE VISIT (OUTPATIENT)
Dept: HEMATOLOGY/ONCOLOGY | Facility: HOSPITAL | Age: 38
End: 2023-12-14
Payer: COMMERCIAL

## 2023-12-14 VITALS
DIASTOLIC BLOOD PRESSURE: 68 MMHG | HEART RATE: 89 BPM | TEMPERATURE: 98.2 F | OXYGEN SATURATION: 100 % | WEIGHT: 162.6 LBS | BODY MASS INDEX: 26.24 KG/M2 | SYSTOLIC BLOOD PRESSURE: 121 MMHG | RESPIRATION RATE: 16 BRPM

## 2023-12-14 DIAGNOSIS — D47.3 ESSENTIAL THROMBOCYTHEMIA (MULTI): Primary | ICD-10-CM

## 2023-12-14 DIAGNOSIS — D47.3 ESSENTIAL THROMBOCYTHEMIA (MULTI): ICD-10-CM

## 2023-12-14 LAB
BASOPHILS # BLD AUTO: 0.03 X10*3/UL (ref 0–0.1)
BASOPHILS NFR BLD AUTO: 0.6 %
EOSINOPHIL # BLD AUTO: 0.05 X10*3/UL (ref 0–0.7)
EOSINOPHIL NFR BLD AUTO: 1 %
ERYTHROCYTE [DISTWIDTH] IN BLOOD BY AUTOMATED COUNT: 12.2 % (ref 11.5–14.5)
HCT VFR BLD AUTO: 32.7 % (ref 36–52)
HGB BLD-MCNC: 11.5 G/DL (ref 12–17.5)
IMM GRANULOCYTES # BLD AUTO: 0.01 X10*3/UL (ref 0–0.7)
IMM GRANULOCYTES NFR BLD AUTO: 0.2 % (ref 0–0.9)
LYMPHOCYTES # BLD AUTO: 2.05 X10*3/UL (ref 1.2–4.8)
LYMPHOCYTES NFR BLD AUTO: 42.1 %
MCH RBC QN AUTO: 38.3 PG (ref 26–34)
MCHC RBC AUTO-ENTMCNC: 35.2 G/DL (ref 32–36)
MCV RBC AUTO: 109 FL (ref 80–100)
MONOCYTES # BLD AUTO: 0.4 X10*3/UL (ref 0.1–1)
MONOCYTES NFR BLD AUTO: 8.2 %
NEUTROPHILS # BLD AUTO: 2.33 X10*3/UL (ref 1.2–7.7)
NEUTROPHILS NFR BLD AUTO: 47.9 %
NRBC BLD-RTO: 0 /100 WBCS (ref 0–0)
PLATELET # BLD AUTO: 374 X10*3/UL (ref 150–450)
RBC # BLD AUTO: 3 X10*6/UL (ref 4–5.9)
WBC # BLD AUTO: 4.9 X10*3/UL (ref 4.4–11.3)

## 2023-12-14 PROCEDURE — 1036F TOBACCO NON-USER: CPT | Performed by: NURSE PRACTITIONER

## 2023-12-14 PROCEDURE — 99215 OFFICE O/P EST HI 40 MIN: CPT | Performed by: NURSE PRACTITIONER

## 2023-12-14 PROCEDURE — 36415 COLL VENOUS BLD VENIPUNCTURE: CPT

## 2023-12-14 PROCEDURE — 85025 COMPLETE CBC W/AUTO DIFF WBC: CPT

## 2023-12-14 PROCEDURE — 3008F BODY MASS INDEX DOCD: CPT | Performed by: NURSE PRACTITIONER

## 2023-12-14 ASSESSMENT — ENCOUNTER SYMPTOMS
LOSS OF SENSATION IN FEET: 0
OCCASIONAL FEELINGS OF UNSTEADINESS: 0
DEPRESSION: 1

## 2023-12-14 ASSESSMENT — PAIN SCALES - GENERAL: PAINLEVEL: 0-NO PAIN

## 2023-12-14 NOTE — PROGRESS NOTES
"Visit Type: Benign Heme Follow-up      History of Present Illness:      ID Statement:    PATRICIO BANEGAS is a 37 year old Female        Chief Complaint: ET   Interval History:    Location:  [facility]  Dx: JAK2+ ET  Tx: hydrea, asa     Patricio is a 36 y/o female assigned at birth (transgender non-binary who prefers \"they/them/their\" pronouns) presenting for follow-up of JAK2+ ET.      Presents 11/30/23 for followup. Lost to followup since last appt 5/2023.    Reports healing issues last 8mo from gender reassignment surgery - reports is leaking pus - is going to meet with a new surgeon and possibly get reverse surgery to fix the issues  Oct 1st-15th - went to [place] and was unable to take Hydrea then as it was confiscated and thrown out per pt. Took ASA while in [place] (only had 500mg capsules available there)  Taking Hydrea daily w/o issue - did not miss any other doses.  Has been taking Hydea 2 caps daily and ASA daily. SE hyperpigmentation. Reports chronic burning of feet and hands bilaterally had resolved overall - on gabapentin 300mg at bedtime  w/o issue but pain is back since out of gabapentin for the last 2mo.    DVT prophyllaxis periop and for 14 days post op with lovenox 40mg daily sc   Plan for surgery 1/31 at [facility] - liposuction and scar revision on breasts    12/14/23 - Here for followup appt after decreasing Hydrea from 1,000mg daily to 500mg daily 2 weeks ago -- no issues with change. Restarted Gabapentin and has not noticed much improvement in burning yet. To get surgery tomorrow now at [facility]. Had site of infection cultured and is to start antibx after surgery. Is going to transfer all care to [facility] - will see hematologist Dr. MAC Correa Dec 21st      The patient has not noted fever, chills, drenching night sweats, weight loss, bone pain, hemorrhage from any site, melena, or respiratory symptoms. No new issues.      No personal history of stroke, MI, or clot.     Completed following " surgeries:  hysterectomy (Dr. Galina Mccann) and mastectomy at [facility] with Dr. River Lackey, and SRS (sexual reassignment sx) - stage 1 ant abdominal phalloplasty 3/2022; stage 2a phalloplasty 9/2022; staged urethroplasty with buccal mucosal graft and preputial graft,  clitoral transposition, L clitoral nerve neurolysis and avance nerve grafting,  clarix interposition, complex wound closure w/ tubularization of neophallus with Dr. Mario Montes on 5/10/23. Had recent emergency surgery- s/p laparoscopic R oophorectomy on 12/10/22 for ovarian torsion.       Review of Systems:   Review of Systems:    10 point review of systems negative except as state in HPI.         Allergies and Intolerances:       Allergies:         erythromycin: Drug, Other, Active       Intolerances:         hydrocodone: Drug, Nausea/Vomiting, Active     Outpatient Medication Profile:   * Patient Currently Takes Medications as of 25-May-2023 14:55 documented in Structured Notes         gabapentin 300 mg oral capsule : Last Dose Taken:  , 1 cap(s) oral once a day (at bedtime), Start Date: 25-May-2023         hydroxyurea 500 mg oral capsule: Last Dose Taken:  , 2 cap(s) oral once  a day, Start Date: 25-May-2023         aspirin 81 mg oral delayed release tablet: Last Dose Taken:  , 1 tab(s)  oral once a day, Start Date: 09-Feb-2023         Hair, Skin & Nails gummies: Last Dose Taken:  , 1 gummy orally once a  day         Multiple Vitamins with Minerals oral tablet: Last Dose Taken:  , 1 tab(s)  orally once a day         lamoTRIgine 100 mg oral tablet: Last Dose Taken:  , 1 tab(s) orally once  a day         cholecalciferol 50 mcg (2000 intl units) oral tablet: Last Dose Taken:   , 1 tab(s) orally once a day         topiramate 25 mg oral tablet: Last Dose Taken:  , 2 tab(s) orally once  a day (at bedtime)         spironolactone 50 mg oral tablet: Last Dose Taken:  , 1 tab(s) orally  once a day         prazosin 1 mg oral capsule: Last Dose Taken:  , 1  cap(s) orally once  a day (at bedtime)         buPROPion 150 mg/12 hours (SR) oral tablet, extended release: Last Dose  Taken:  , 1 tab(s) orally 2 times a day         clindamycin 1% topical lotion: Last Dose Taken:  , Apply topically to  affected area 2 times a day         Hydrocort 2.5% topical cream: Last Dose Taken:  , Apply topically to  affected area 3 times a day, As Needed         triamcinolone 0.1% topical ointment: Last Dose Taken:  , Apply topically  to affected area 3 times a day, As Needed dry areas/dark bumps         Tretinoin Emollient 0.05% topical cream: Last Dose Taken:  , Apply topically  to affected area once a day (at bedtime)        Family History: No Family History items are recorded  in the problem list.       Social History:   Social Substance History:  ·  Smoking Status never smoker (1)           Physical Exam:      Constitutional: Well developed, awake/alert/oriented  x3, no distress, alert and cooperative   Eyes: clear sclera   ENMT: mucous membranes moist   Head/Neck: Neck supple, no apparent injury, trachea  midline   Respiratory/Thorax: Patent airways, CTAB, normal  breath sounds with good chest expansion, thorax symmetric   Cardiovascular: Regular, rate and rhythm, no murmurs,  normal S 1and S 2   Gastrointestinal: Abdomen soft nontender, nondistended,  + bowel sounds.   Musculoskeletal: ROM intact, normal strength   Extremities: normal extremities, no cyanosis, no  clubbing, no edema except inner L ankle nonpitting   Neurological: Patient is alert and oriented x3. Nonfocal  exam. No myoclonus   Lymphatic: No significant lymphadenopathy   Psychological: Pleasant, appropriate, and easily  engaged   Skin: Warm and dry. Hyperpigmentation            Lab Results:     ·  Results        CBC date/time       WBC     HGB     HCT     PLT     Neut      25-May-2023 11:18   4.3(L)  12.2    34.5(L)  422     2.20  09-Feb-2023 09:10   5.3     12.1    35.3(L) 434     3.01  12-Jan-2023 08:26   5.2      11.8(L) 33.4(L) 468(H)  2.22  11-Dec-2022 06:00   8.1     11.1(L) 32.4(L) 489(H)  N/A  10-Dec-2022 12:05   6.1     12.4    35.1(L) 536(H)  4.58  13-Oct-2022 13:55   5.2     12.3    34.2(L) 434     2.79  18-Aug-2022 12:36   5.9     13.0    37.2    516(H)  2.97     BMP date/time       NA              K               CL              CO2             BUN             CREAT             25-May-2023 11:18   139             4.1             105             N/A             12              1.09(H)  09-Feb-2023 09:10   140             3.9             106             N/A             8               1.00  12-Jan-2023 08:26   138             3.6             105             N/A             10              1.05  11-Dec-2022 06:00   137             3.8             103             N/A             10              1.07(H)  10-Dec-2022 12:05   138             4.4             103             N/A             10              1.14(H)  13-Oct-2022 13:55   136             3.9             101             N/A             12              1.03  18-Aug-2022 12:36   137             4.1             102             N/A             11              0.95     Hepatic date/time   T Pro   T Bili  AST     ALT     ALKP    ALB       10-Dec-2022 12:05   7.8     0.6     N/A     32      103     4.6  09-Mar-2017 14:48   8.8(H)  0.6     33      52(H)    107     5.3(H)  25-Mar-2010 18:44   7.7     0.6     13      32      75      4.4     LDH date/time       LDH     30-Nov-2017 09:21   N/A  30-Nov-2017 09:21   193  09-Nov-2017 11:59   210  09-Nov-2017 11:59   N/A  19-Mar-2014 13:36   N/A  19-Mar-2014 13:36   N/A        I have reviewed these laboratory results:     Comprehensive Metabolic Panel  25-May-2023 11:18:00       Result Value    Glucose, Serum  95    NA  139    K  4.1    CL  105    Bicarbonate, Serum  24    Anion Gap, Serum  14    BUN  12    CREAT  1.09   H   GFR Female  66    Calcium, Serum  9.9    ALB  4.6    ALKP  106    T Pro  8.0    T Bili  0.8    Alanine  "Aminotransferase, Serum  33    Aspartate Transaminase, Serum  24       Complete Blood Count + Differential  25-May-2023 11:18:00       Result Value    White Blood Cell Count  4.3   L   Red Blood Cell Count  3.21   L   HGB  12.2    HCT  34.5   L   MCV  107   H   MCHC  35.4    PLT  422    RDW-CV  11.6    Neutrophil %  50.9    Immature Granulocytes %  0.5    Lymphocyte %  40.3    Monocyte %  6.5    Eosinophil %  0.9    Basophil %  0.9    Neutrophil Count  2.20    Lymphocyte Count  1.74    Monocyte Count  0.28    Eosinophil Count  0.04    Basophil Count  0.04          Assessment and Plan:      Assessment and Plan:   Assessment:    Location:  Main [facility]  Dx: JAK2+ ET  Tx: hydrea, asa     Lillian is a 37 y/o female assigned at birth (transgender non-binary who prefers \"they/them/their\" pronouns) presenting for follow-up of JAK2+ ET.      Has been better with compliance - denies missing any doses - taking hydrea 1000mg daily in addition to her daily asa. Known hyperpigmentation SE of hydrea. Chronic burning of feet bilaterally that has resolved with gabapentin and better control of ET.      11/30/23 - Lab results from today show plt count is normal at 363k, wbc line normal, hgb 12.2, mcv 108. CMP stable. We spoke extensively about our goal for plt count being less than or equal to 500k, the risks of clotting/CVA, and s/sx to be aware of related to  a blood clot. Discussed treatment may also improve paresthesia which it has in the past.      - Oarrs reviewed and is stable. Refilled Gabapentin 300mg PO daily     - As plt count has decreased and she is having healing/infection issues s/p phalloplasty, we will decrease Hydrea from 1,000mg daily to 500mg daily and re-evaluate in 2 weeks     12/14/23 - Labs from today show plt count is normal at 374k, wbc line normal, hgb decreased to 11.5, mcv 109. Decreased Hydrea to 500mg PO daily. Does have infection and is to start antibx - discussed possible mild decrease in hgb and not " much increase in plt count could be from infection. Given this, will have pt hold Hydrea for now. Is going to transfer all care to [facility] - will see hematologist Dr. MAC Correa Dec 21st     Will continue previous plans for surgery: DVT prophyllaxis periop and for 14 days post op with lovenox 40mg daily sc       Getting surgery [facility] - liposuction and scar revision  Plan for surgery tomorrow - ok to hold Hydrea for surgery and restart after surgery. OK to hold ASA before surgery and restart after Lovenox complete. OK for surgery from hematological standpoint.        Plan:  1. Hold Hydrea for the next week, until seen by new hematologist Dr. MAC Correa in 1 week (Dec 21st). To get labs 1 day before appt (cbcd, cmp). Given paper lab req  2. Continue gabapentin 300mg PO once day before bed. OAR[place] reviewed 2 weeks ago and stable       I had an extensive discussion with the patient regarding the diagnosis and discussed the plan of therapy, including general considerations regarding side effects and outcomes. Pt understood and gave appropriate teach back about the plan of care. All questions  were answered to the patient's satisfaction. The patient is instructed to contact us at any time if questions or problems arise. Thank you for the opportunity to participate in the care of this very pleasant patient.     Total time =40 minutes. 50% or more of this time was spent in counseling and/or coordination of care including reviewing medical history/radiology/labs, examining patient, formulating outlined plan with team, and discussing plan with patient/family.

## 2023-12-20 ENCOUNTER — CLINICAL SUPPORT (OUTPATIENT)
Dept: ALLERGY | Facility: HOSPITAL | Age: 38
End: 2023-12-20
Payer: COMMERCIAL

## 2023-12-20 VITALS
HEART RATE: 88 BPM | OXYGEN SATURATION: 99 % | DIASTOLIC BLOOD PRESSURE: 72 MMHG | SYSTOLIC BLOOD PRESSURE: 114 MMHG | RESPIRATION RATE: 20 BRPM | TEMPERATURE: 97.9 F

## 2023-12-20 DIAGNOSIS — J30.1 SEASONAL ALLERGIC RHINITIS DUE TO POLLEN: ICD-10-CM

## 2023-12-20 PROCEDURE — 95117 IMMUNOTHERAPY INJECTIONS: CPT | Performed by: ALLERGY & IMMUNOLOGY

## 2023-12-20 PROCEDURE — INJT2 ALLERGY IMMUNOTHERAPY 2+ INJECTIONS: Performed by: ALLERGY & IMMUNOLOGY

## 2023-12-27 ENCOUNTER — CLINICAL SUPPORT (OUTPATIENT)
Dept: ALLERGY | Facility: HOSPITAL | Age: 38
End: 2023-12-27
Payer: COMMERCIAL

## 2023-12-27 VITALS
HEART RATE: 89 BPM | RESPIRATION RATE: 20 BRPM | TEMPERATURE: 98.3 F | OXYGEN SATURATION: 99 % | SYSTOLIC BLOOD PRESSURE: 120 MMHG | DIASTOLIC BLOOD PRESSURE: 80 MMHG

## 2023-12-27 DIAGNOSIS — J30.1 SEASONAL ALLERGIC RHINITIS DUE TO POLLEN: ICD-10-CM

## 2023-12-27 PROCEDURE — 95117 IMMUNOTHERAPY INJECTIONS: CPT | Performed by: STUDENT IN AN ORGANIZED HEALTH CARE EDUCATION/TRAINING PROGRAM

## 2024-01-03 ENCOUNTER — CLINICAL SUPPORT (OUTPATIENT)
Dept: ALLERGY | Facility: HOSPITAL | Age: 39
End: 2024-01-03
Payer: COMMERCIAL

## 2024-01-03 VITALS
DIASTOLIC BLOOD PRESSURE: 80 MMHG | RESPIRATION RATE: 16 BRPM | SYSTOLIC BLOOD PRESSURE: 113 MMHG | TEMPERATURE: 98.3 F | HEART RATE: 80 BPM | OXYGEN SATURATION: 100 %

## 2024-01-03 DIAGNOSIS — J30.1 SEASONAL ALLERGIC RHINITIS DUE TO POLLEN: ICD-10-CM

## 2024-01-03 PROCEDURE — 95117 IMMUNOTHERAPY INJECTIONS: CPT | Performed by: ALLERGY & IMMUNOLOGY

## 2024-01-03 NOTE — PROGRESS NOTES
Veena is here today for their regularly scheduled immunotherapy injection per protocol. Patient denies recent illness or asthma exacerbation. Patient reports taking antihistimine before today's visit. Lungs clear and equal bilaterally. Patient reports delayed ~30mm local reaction after last injection lasting ~2 days. Patient received their injection as recorded in flowsheet. Patient monitored for 30 minutes after injection and left the office at baseline state of health. Will continue allergy immunotherapy as planned. Patient instructed to call RN line if new symptoms or reaction from today's injection are noted.

## 2024-01-04 ENCOUNTER — TELEMEDICINE (OUTPATIENT)
Dept: ENDOCRINOLOGY | Facility: CLINIC | Age: 39
End: 2024-01-04
Payer: COMMERCIAL

## 2024-01-04 VITALS — BODY MASS INDEX: 25.88 KG/M2 | HEIGHT: 66 IN | WEIGHT: 161 LBS

## 2024-01-04 DIAGNOSIS — Z31.41 FERTILITY TESTING: Primary | ICD-10-CM

## 2024-01-04 PROCEDURE — 99214 OFFICE O/P EST MOD 30 MIN: CPT | Performed by: OBSTETRICS & GYNECOLOGY

## 2024-01-04 ASSESSMENT — PATIENT HEALTH QUESTIONNAIRE - PHQ9
4. FEELING TIRED OR HAVING LITTLE ENERGY: NEARLY EVERY DAY
2. FEELING DOWN, DEPRESSED OR HOPELESS: MORE THAN HALF THE DAYS
SUM OF ALL RESPONSES TO PHQ QUESTIONS 1-9: 19
9. THOUGHTS THAT YOU WOULD BE BETTER OFF DEAD, OR OF HURTING YOURSELF: NOT AT ALL
7. TROUBLE CONCENTRATING ON THINGS, SUCH AS READING THE NEWSPAPER OR WATCHING TELEVISION: NEARLY EVERY DAY
10. IF YOU CHECKED OFF ANY PROBLEMS, HOW DIFFICULT HAVE THESE PROBLEMS MADE IT FOR YOU TO DO YOUR WORK, TAKE CARE OF THINGS AT HOME, OR GET ALONG WITH OTHER PEOPLE: EXTREMELY DIFFICULT
5. POOR APPETITE OR OVEREATING: NEARLY EVERY DAY
3. TROUBLE FALLING OR STAYING ASLEEP OR SLEEPING TOO MUCH: NEARLY EVERY DAY
SUM OF ALL RESPONSES TO PHQ9 QUESTIONS 1 AND 2: 4
1. LITTLE INTEREST OR PLEASURE IN DOING THINGS: MORE THAN HALF THE DAYS
8. MOVING OR SPEAKING SO SLOWLY THAT OTHER PEOPLE COULD HAVE NOTICED. OR THE OPPOSITE, BEING SO FIGETY OR RESTLESS THAT YOU HAVE BEEN MOVING AROUND A LOT MORE THAN USUAL: NOT AT ALL
6. FEELING BAD ABOUT YOURSELF - OR THAT YOU ARE A FAILURE OR HAVE LET YOURSELF OR YOUR FAMILY DOWN: NEARLY EVERY DAY

## 2024-01-04 ASSESSMENT — COLUMBIA-SUICIDE SEVERITY RATING SCALE - C-SSRS
1. IN THE PAST MONTH, HAVE YOU WISHED YOU WERE DEAD OR WISHED YOU COULD GO TO SLEEP AND NOT WAKE UP?: NO
2. HAVE YOU ACTUALLY HAD ANY THOUGHTS OF KILLING YOURSELF?: NO
6. HAVE YOU EVER DONE ANYTHING, STARTED TO DO ANYTHING, OR PREPARED TO DO ANYTHING TO END YOUR LIFE?: NO

## 2024-01-04 ASSESSMENT — PAIN SCALES - GENERAL: PAINLEVEL: 0-NO PAIN
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
## 2024-01-10 ENCOUNTER — CLINICAL SUPPORT (OUTPATIENT)
Dept: ALLERGY | Facility: HOSPITAL | Age: 39
End: 2024-01-10
Payer: COMMERCIAL

## 2024-01-10 VITALS — HEART RATE: 85 BPM | SYSTOLIC BLOOD PRESSURE: 126 MMHG | TEMPERATURE: 98.1 F | DIASTOLIC BLOOD PRESSURE: 76 MMHG

## 2024-01-10 DIAGNOSIS — J30.1 SEASONAL ALLERGIC RHINITIS DUE TO POLLEN: ICD-10-CM

## 2024-01-10 PROCEDURE — 95117 IMMUNOTHERAPY INJECTIONS: CPT | Performed by: STUDENT IN AN ORGANIZED HEALTH CARE EDUCATION/TRAINING PROGRAM

## 2024-01-16 ENCOUNTER — APPOINTMENT (OUTPATIENT)
Dept: UROLOGY | Facility: CLINIC | Age: 39
End: 2024-01-16
Payer: COMMERCIAL

## 2024-01-17 ENCOUNTER — CLINICAL SUPPORT (OUTPATIENT)
Dept: ALLERGY | Facility: HOSPITAL | Age: 39
End: 2024-01-17
Payer: COMMERCIAL

## 2024-01-17 ENCOUNTER — TELEPHONE (OUTPATIENT)
Dept: ALLERGY | Facility: HOSPITAL | Age: 39
End: 2024-01-17

## 2024-01-17 VITALS — TEMPERATURE: 98.1 F | HEART RATE: 88 BPM | DIASTOLIC BLOOD PRESSURE: 81 MMHG | SYSTOLIC BLOOD PRESSURE: 118 MMHG

## 2024-01-17 DIAGNOSIS — J30.1 SEASONAL ALLERGIC RHINITIS DUE TO POLLEN: ICD-10-CM

## 2024-01-17 DIAGNOSIS — J30.89 ALLERGIC RHINITIS DUE TO MOLD: ICD-10-CM

## 2024-01-17 DIAGNOSIS — J30.1 SEASONAL ALLERGIC RHINITIS DUE TO POLLEN: Primary | ICD-10-CM

## 2024-01-17 PROCEDURE — 95117 IMMUNOTHERAPY INJECTIONS: CPT | Performed by: ALLERGY & IMMUNOLOGY

## 2024-01-17 PROCEDURE — INJT2 ALLERGY IMMUNOTHERAPY 2+ INJECTIONS: Performed by: ALLERGY & IMMUNOLOGY

## 2024-01-17 RX ORDER — LEVOCETIRIZINE DIHYDROCHLORIDE 5 MG/1
5 TABLET, FILM COATED ORAL DAILY
Qty: 30 TABLET | Refills: 11 | Status: SHIPPED | OUTPATIENT
Start: 2024-01-17 | End: 2025-01-16

## 2024-01-17 NOTE — TELEPHONE ENCOUNTER
Patient requesting antihistamine be changed to Xyzal, pharmacist told them it would be covered by their insurance.

## 2024-01-24 ENCOUNTER — CLINICAL SUPPORT (OUTPATIENT)
Dept: ALLERGY | Facility: HOSPITAL | Age: 39
End: 2024-01-24
Payer: COMMERCIAL

## 2024-01-24 VITALS
TEMPERATURE: 98.1 F | OXYGEN SATURATION: 98 % | DIASTOLIC BLOOD PRESSURE: 80 MMHG | SYSTOLIC BLOOD PRESSURE: 118 MMHG | RESPIRATION RATE: 20 BRPM | HEART RATE: 89 BPM

## 2024-01-24 DIAGNOSIS — J30.1 SEASONAL ALLERGIC RHINITIS DUE TO POLLEN: ICD-10-CM

## 2024-01-24 PROCEDURE — 95117 IMMUNOTHERAPY INJECTIONS: CPT | Performed by: STUDENT IN AN ORGANIZED HEALTH CARE EDUCATION/TRAINING PROGRAM

## 2024-01-24 PROCEDURE — INJT2 ALLERGY IMMUNOTHERAPY 2+ INJECTIONS: Performed by: STUDENT IN AN ORGANIZED HEALTH CARE EDUCATION/TRAINING PROGRAM

## 2024-01-26 ENCOUNTER — TELEMEDICINE (OUTPATIENT)
Dept: BEHAVIORAL HEALTH | Facility: CLINIC | Age: 39
End: 2024-01-26
Payer: COMMERCIAL

## 2024-01-26 DIAGNOSIS — F31.9 BIPOLAR 1 DISORDER (MULTI): ICD-10-CM

## 2024-01-26 DIAGNOSIS — G47.00 INSOMNIA, UNSPECIFIED TYPE: ICD-10-CM

## 2024-01-26 DIAGNOSIS — F43.9 TRAUMA AND STRESSOR-RELATED DISORDER: ICD-10-CM

## 2024-01-26 PROCEDURE — 99214 OFFICE O/P EST MOD 30 MIN: CPT | Performed by: PSYCHIATRY & NEUROLOGY

## 2024-01-26 RX ORDER — TOPIRAMATE 50 MG/1
50 TABLET, FILM COATED ORAL NIGHTLY
Qty: 30 TABLET | Refills: 2 | Status: SHIPPED | OUTPATIENT
Start: 2024-01-26 | End: 2024-05-16 | Stop reason: SDUPTHER

## 2024-01-26 RX ORDER — BUPROPION HYDROCHLORIDE 150 MG/1
150 TABLET, EXTENDED RELEASE ORAL DAILY
Qty: 30 TABLET | Refills: 2 | Status: SHIPPED | OUTPATIENT
Start: 2024-01-26 | End: 2024-05-12 | Stop reason: SDUPTHER

## 2024-01-26 RX ORDER — MELATONIN 5 MG
5 CAPSULE ORAL NIGHTLY PRN
Qty: 30 CAPSULE | Refills: 2 | Status: SHIPPED | OUTPATIENT
Start: 2024-01-26 | End: 2024-04-25

## 2024-01-26 RX ORDER — LAMOTRIGINE 100 MG/1
100 TABLET ORAL DAILY
Qty: 30 TABLET | Refills: 2 | Status: SHIPPED | OUTPATIENT
Start: 2024-01-26 | End: 2024-05-16 | Stop reason: SDUPTHER

## 2024-01-26 RX ORDER — ESCITALOPRAM OXALATE 10 MG/1
10 TABLET ORAL DAILY
Qty: 30 TABLET | Refills: 2 | Status: SHIPPED | OUTPATIENT
Start: 2024-01-26 | End: 2024-05-16 | Stop reason: ALTCHOICE

## 2024-01-26 RX ORDER — PRAZOSIN HYDROCHLORIDE 1 MG/1
1-3 CAPSULE ORAL NIGHTLY
Qty: 90 CAPSULE | Refills: 2 | Status: SHIPPED | OUTPATIENT
Start: 2024-01-26 | End: 2024-05-16 | Stop reason: SDUPTHER

## 2024-01-26 ASSESSMENT — ENCOUNTER SYMPTOMS
SLEEP DISTURBANCE: 1
DYSPHORIC MOOD: 1
NERVOUS/ANXIOUS: 0

## 2024-01-26 NOTE — PROGRESS NOTES
"Adult Ambulatory Psychiatry Progress Note      Assessment/Plan     Impression:  Lillian Manriquez \"Shakeel" is a 38 y.o. 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 They were not labile or enraged (yelling) in this session while discussing it but they are still angry. Reviewed notes from other providers.     Plan:      Bipolar d/o - lamotrigine 100mg daily, bupropion SR 150mg daily  Trauma/anxiety -escitalopram 10mg daily, prazosin 1-3mg at bedtime, c/w med ed, psycho ed, supportive psychotherapy to build therapeutic rapport, f/u 2 months  Insomnia - melatonin 5mg at bedtime prn  Weight gain - topiramate 50mg qhs      Subjective   HPI:  Pt arrived on time. They had a crying spell at work and walked out so they're not working there anymore. Sleep has improved and then beyond to \"sleeping all day\". Denies sx of benji. They had some SI yesterday but denies today. A friend  yesterday and they had thoughts of \"why him and not me?\". He had thoughts of taking pills. They talked to a friend and it's gone now. He's happy with his top surgery. He's proud to be \"trans-neutral\". He is unhappy with the phalloplasty. He's unhappy with the lack of feelings. He'd rather have a procedure where feeling is restored. He states he wasn't informed that there was a chance of no feeling. He's working with [facility] now to address these issues.           Review of Systems   Psychiatric/Behavioral:  Positive for dysphoric mood and sleep disturbance. Negative for suicidal ideas. The patient is not nervous/anxious.          Objective   Mental Status Exam:       Vitals:  There were no vitals filed for this visit.    Current Medications:  Current Outpatient Medications on File Prior to Visit   Medication Sig Dispense Refill    acetaminophen (Tylenol) 500 mg tablet Take 1-2 tablets (500-1,000 mg) by mouth " every 6 hours if needed for mild pain (1 - 3).      albuterol 90 mcg/actuation inhaler Inhale 2 puffs see administration instructions. EVERY 4-6 HOURS AS NEEDED.      aspirin 81 mg EC tablet Take 1 tablet (81 mg) by mouth 1 time.      cholecalciferol (Vitamin D-3) 25 MCG (1000 UT) tablet Take 2 tablets (2,000 Units) by mouth once daily.      clindamycin (Cleocin T) 1 % lotion Apply topically once daily. 60 mL 2    clobetasol (Temovate) 0.05 % ointment Apply 1 Application topically if needed. APPLY TO MOSQUITO BITES AS NEEDED      docusate sodium (Colace) 100 mg capsule Take 1 capsule (100 mg) by mouth twice a day.      fexofenadine (Allegra) 180 mg tablet Take 1 tablet (180 mg) by mouth once daily.      fluticasone (Flonase) 50 mcg/actuation nasal spray 1 spray by Does not apply route once daily.      gabapentin (Neurontin) 300 mg capsule Take 1 capsule (300 mg) by mouth once daily. 30 capsule 2    hydrocortisone 2.5 % cream Apply topically once daily. 30 g 5    hydroquinone 4 % cream Apply topically once daily. 28 g 1    inhaler, assist devices (E-Z SPACER MISC) 1 Units see administration instructions. USE WITH INHALER AS DIRECTED      levocetirizine (Xyzal) 5 mg tablet Take 1 tablet (5 mg) by mouth once daily. 30 tablet 11    spironolactone (Aldactone) 50 mg tablet Take 1 tablet (50 mg) by mouth once daily. 30 tablet 2    tretinoin (Retin-A) 0.05 % cream Apply topically once daily at bedtime. apply to face 45 g 5    triamcinolone (Kenalog) 0.1 % ointment Apply 1 Application topically if needed (APPLY ONE APPLICATION TOPICALLY ON ARMS/LEGS DAILY AS NEEDED FOR DRY AREAS AND FOR DARK BUMPS).      [DISCONTINUED] buPROPion SR (Wellbutrin SR) 150 mg 12 hr tablet Take 1 tablet (150 mg) by mouth once daily. Do not crush, chew, or split. 30 tablet 2    [DISCONTINUED] escitalopram (Lexapro) 10 mg tablet Take 1 tablet (10 mg) by mouth once daily. 30 tablet 2    [DISCONTINUED] lamoTRIgine (LaMICtal) 100 mg tablet Take 1  tablet (100 mg) by mouth once daily. 30 tablet 2    [DISCONTINUED] melatonin 5 mg capsule Take 1 capsule (5 mg) by mouth as needed at bedtime (insomnia). 30 capsule 1    [DISCONTINUED] prazosin (Minipress) 1 mg capsule Take 1 capsule (1 mg) by mouth once daily at bedtime. 30 capsule 2    [DISCONTINUED] topiramate (Topamax) 50 mg tablet Take 1 tablet (50 mg) by mouth once daily at bedtime. 30 tablet 2     No current facility-administered medications on file prior to visit.       Lab Review:   Lab on 12/14/2023   Component Date Value    WBC 12/14/2023 4.9     nRBC 12/14/2023 0.0     RBC 12/14/2023 3.00 (L)     Hemoglobin 12/14/2023 11.5 (L)     Hematocrit 12/14/2023 32.7 (L)     MCV 12/14/2023 109 (H)     MCH 12/14/2023 38.3 (H)     MCHC 12/14/2023 35.2     RDW 12/14/2023 12.2     Platelets 12/14/2023 374     Neutrophils % 12/14/2023 47.9     Immature Granulocytes %,* 12/14/2023 0.2     Lymphocytes % 12/14/2023 42.1     Monocytes % 12/14/2023 8.2     Eosinophils % 12/14/2023 1.0     Basophils % 12/14/2023 0.6     Neutrophils Absolute 12/14/2023 2.33     Immature Granulocytes Ab* 12/14/2023 0.01     Lymphocytes Absolute 12/14/2023 2.05     Monocytes Absolute 12/14/2023 0.40     Eosinophils Absolute 12/14/2023 0.05     Basophils Absolute 12/14/2023 0.03    Office Visit on 12/07/2023   Component Date Value    Tissue/Wound Culture/Sme* 12/07/2023 (4+) Abundant Mixed Gram-Positive Bacteria     Tissue/Wound Culture/Sme* 12/07/2023 (2+) Few Mixed Anaerobic Bacteria     Beta Lactamase (Cefinase) 12/07/2023 Positive     Gram Stain 12/07/2023 (4+) Abundant Mixed Gram positive and Gram negative bacteria (A)     Gram Stain 12/07/2023 No polymorphonuclear leukocytes seen (A)     POC Color, Urine 12/07/2023 Yellow     POC Appearance, Urine 12/07/2023 Clear     POC Glucose, Urine 12/07/2023 NEGATIVE     POC Bilirubin, Urine 12/07/2023 NEGATIVE     POC Ketones, Urine 12/07/2023 NEGATIVE     POC Specific Gravity, Ur* 12/07/2023 1.020      POC Blood, Urine 12/07/2023 NEGATIVE     POC PH, Urine 12/07/2023 5.5     POC Protein, Urine 12/07/2023 NEGATIVE     POC Urobilinogen, Urine 12/07/2023 0.2     Poc Nitrite, Urine 12/07/2023 NEGATIVE     POC Leukocytes, Urine 12/07/2023 SMALL (1+) (A)    Lab on 11/30/2023   Component Date Value    WBC 11/30/2023 4.4     nRBC 11/30/2023 0.0     RBC 11/30/2023 3.24 (L)     Hemoglobin 11/30/2023 12.2     Hematocrit 11/30/2023 34.9 (L)     MCV 11/30/2023 108 (H)     MCH 11/30/2023 37.7 (H)     MCHC 11/30/2023 35.0     RDW 11/30/2023 12.2     Platelets 11/30/2023 363     Neutrophils % 11/30/2023 52.1     Immature Granulocytes %,* 11/30/2023 0.2     Lymphocytes % 11/30/2023 42.1     Monocytes % 11/30/2023 4.6     Eosinophils % 11/30/2023 0.5     Basophils % 11/30/2023 0.5     Neutrophils Absolute 11/30/2023 2.27     Immature Granulocytes Ab* 11/30/2023 0.01     Lymphocytes Absolute 11/30/2023 1.83     Monocytes Absolute 11/30/2023 0.20     Eosinophils Absolute 11/30/2023 0.02     Basophils Absolute 11/30/2023 0.02     Glucose 11/30/2023 87     Sodium 11/30/2023 138     Potassium 11/30/2023 3.9     Chloride 11/30/2023 103     Bicarbonate 11/30/2023 27     Anion Gap 11/30/2023 12     Urea Nitrogen 11/30/2023 10     Creatinine 11/30/2023 1.04     eGFR 11/30/2023 70     Calcium 11/30/2023 10.1     Albumin 11/30/2023 4.7     Alkaline Phosphatase 11/30/2023 92     Total Protein 11/30/2023 8.0     AST 11/30/2023 25     Bilirubin, Total 11/30/2023 0.7     ALT 11/30/2023 50     Iron 11/30/2023 126     UIBC 11/30/2023 169     TIBC 11/30/2023 295     % Saturation 11/30/2023 43     Ferritin 11/30/2023 446 (H)        Orders:  Diagnoses and all orders for this visit:  Bipolar 1 disorder (CMS/[facility])  -     buPROPion SR (Wellbutrin SR) 150 mg 12 hr tablet; Take 1 tablet (150 mg) by mouth once daily. Do not crush, chew, or split.  -     lamoTRIgine (LaMICtal) 100 mg tablet; Take 1 tablet (100 mg) by mouth once daily.  -     topiramate  (Topamax) 50 mg tablet; Take 1 tablet (50 mg) by mouth once daily at bedtime.  Trauma and stressor-related disorder  -     escitalopram (Lexapro) 10 mg tablet; Take 1 tablet (10 mg) by mouth once daily.  -     prazosin (Minipress) 1 mg capsule; Take 1-3 capsules (1-3 mg) by mouth once daily at bedtime.  Insomnia, unspecified type  -     melatonin 5 mg capsule; Take 1 capsule (5 mg) by mouth as needed at bedtime (insomnia).            Next Appointment:  Follow up in 6 weeks (on 3/8/2024).

## 2024-01-31 ENCOUNTER — CLINICAL SUPPORT (OUTPATIENT)
Dept: ALLERGY | Facility: HOSPITAL | Age: 39
End: 2024-01-31
Payer: COMMERCIAL

## 2024-01-31 VITALS
OXYGEN SATURATION: 100 % | HEART RATE: 82 BPM | RESPIRATION RATE: 16 BRPM | SYSTOLIC BLOOD PRESSURE: 116 MMHG | DIASTOLIC BLOOD PRESSURE: 77 MMHG | TEMPERATURE: 98.1 F

## 2024-01-31 DIAGNOSIS — J30.9 ALLERGIC RHINITIS, UNSPECIFIED SEASONALITY, UNSPECIFIED TRIGGER: ICD-10-CM

## 2024-01-31 PROCEDURE — 95117 IMMUNOTHERAPY INJECTIONS: CPT | Performed by: ALLERGY & IMMUNOLOGY

## 2024-01-31 PROCEDURE — INJT2 ALLERGY IMMUNOTHERAPY 2+ INJECTIONS: Performed by: ALLERGY & IMMUNOLOGY

## 2024-02-07 ENCOUNTER — CLINICAL SUPPORT (OUTPATIENT)
Dept: ALLERGY | Facility: HOSPITAL | Age: 39
End: 2024-02-07
Payer: COMMERCIAL

## 2024-02-07 VITALS
DIASTOLIC BLOOD PRESSURE: 79 MMHG | TEMPERATURE: 98.2 F | SYSTOLIC BLOOD PRESSURE: 121 MMHG | RESPIRATION RATE: 20 BRPM | HEART RATE: 82 BPM

## 2024-02-07 DIAGNOSIS — J30.1 SEASONAL ALLERGIC RHINITIS DUE TO POLLEN: ICD-10-CM

## 2024-02-07 PROCEDURE — 95117 IMMUNOTHERAPY INJECTIONS: CPT | Performed by: ALLERGY & IMMUNOLOGY

## 2024-02-07 PROCEDURE — INJT2 ALLERGY IMMUNOTHERAPY 2+ INJECTIONS: Performed by: ALLERGY & IMMUNOLOGY

## 2024-02-14 ENCOUNTER — CLINICAL SUPPORT (OUTPATIENT)
Dept: ALLERGY | Facility: HOSPITAL | Age: 39
End: 2024-02-14
Payer: COMMERCIAL

## 2024-02-14 VITALS — DIASTOLIC BLOOD PRESSURE: 72 MMHG | TEMPERATURE: 99 F | SYSTOLIC BLOOD PRESSURE: 125 MMHG | HEART RATE: 105 BPM

## 2024-02-14 DIAGNOSIS — J30.9 ALLERGIC RHINITIS, UNSPECIFIED SEASONALITY, UNSPECIFIED TRIGGER: ICD-10-CM

## 2024-02-14 PROCEDURE — INJT2 ALLERGY IMMUNOTHERAPY 2+ INJECTIONS: Performed by: ALLERGY & IMMUNOLOGY

## 2024-02-14 PROCEDURE — 95117 IMMUNOTHERAPY INJECTIONS: CPT | Performed by: ALLERGY & IMMUNOLOGY

## 2024-02-21 ENCOUNTER — CLINICAL SUPPORT (OUTPATIENT)
Dept: ALLERGY | Facility: HOSPITAL | Age: 39
End: 2024-02-21
Payer: COMMERCIAL

## 2024-02-21 VITALS — TEMPERATURE: 97.9 F | HEART RATE: 81 BPM | SYSTOLIC BLOOD PRESSURE: 114 MMHG | DIASTOLIC BLOOD PRESSURE: 78 MMHG

## 2024-02-21 DIAGNOSIS — J30.1 SEASONAL ALLERGIC RHINITIS DUE TO POLLEN: ICD-10-CM

## 2024-02-21 PROCEDURE — 95117 IMMUNOTHERAPY INJECTIONS: CPT | Performed by: ALLERGY & IMMUNOLOGY

## 2024-02-21 PROCEDURE — INJT2 ALLERGY IMMUNOTHERAPY 2+ INJECTIONS: Performed by: ALLERGY & IMMUNOLOGY

## 2024-02-28 ENCOUNTER — CLINICAL SUPPORT (OUTPATIENT)
Dept: ALLERGY | Facility: HOSPITAL | Age: 39
End: 2024-02-28
Payer: COMMERCIAL

## 2024-02-28 VITALS
RESPIRATION RATE: 20 BRPM | SYSTOLIC BLOOD PRESSURE: 126 MMHG | TEMPERATURE: 97.8 F | DIASTOLIC BLOOD PRESSURE: 77 MMHG | OXYGEN SATURATION: 99 % | HEART RATE: 94 BPM

## 2024-02-28 DIAGNOSIS — J30.1 SEASONAL ALLERGIC RHINITIS DUE TO POLLEN: ICD-10-CM

## 2024-02-28 PROCEDURE — 95117 IMMUNOTHERAPY INJECTIONS: CPT | Performed by: ALLERGY & IMMUNOLOGY

## 2024-02-28 PROCEDURE — INJT2 ALLERGY IMMUNOTHERAPY 2+ INJECTIONS: Performed by: ALLERGY & IMMUNOLOGY

## 2024-03-06 ENCOUNTER — APPOINTMENT (OUTPATIENT)
Dept: ALLERGY | Facility: HOSPITAL | Age: 39
End: 2024-03-06
Payer: COMMERCIAL

## 2024-03-08 ENCOUNTER — APPOINTMENT (OUTPATIENT)
Dept: BEHAVIORAL HEALTH | Facility: CLINIC | Age: 39
End: 2024-03-08
Payer: COMMERCIAL

## 2024-03-12 ENCOUNTER — APPOINTMENT (OUTPATIENT)
Dept: UROLOGY | Facility: CLINIC | Age: 39
End: 2024-03-12
Payer: COMMERCIAL

## 2024-03-12 ENCOUNTER — TELEPHONE (OUTPATIENT)
Dept: ADMISSION | Facility: HOSPITAL | Age: 39
End: 2024-03-12

## 2024-03-12 NOTE — TELEPHONE ENCOUNTER
Received a refill request from pt's [facility] pharmacy for Gabapentin since it was last prescribed by Gi Rodriguez CNP. Per Gi's last note, this pt was transferring all care to [facility] and is now seeing Dr. Ankit oCrrea as her hematology provider. I called [facility] and let them know.

## 2024-03-13 ENCOUNTER — CLINICAL SUPPORT (OUTPATIENT)
Dept: ALLERGY | Facility: HOSPITAL | Age: 39
End: 2024-03-13
Payer: COMMERCIAL

## 2024-03-13 ENCOUNTER — APPOINTMENT (OUTPATIENT)
Dept: ALLERGY | Facility: HOSPITAL | Age: 39
End: 2024-03-13
Payer: COMMERCIAL

## 2024-03-13 VITALS
DIASTOLIC BLOOD PRESSURE: 78 MMHG | RESPIRATION RATE: 16 BRPM | HEART RATE: 67 BPM | OXYGEN SATURATION: 100 % | TEMPERATURE: 98.4 F | SYSTOLIC BLOOD PRESSURE: 118 MMHG

## 2024-03-13 DIAGNOSIS — J30.1 SEASONAL ALLERGIC RHINITIS DUE TO POLLEN: ICD-10-CM

## 2024-03-13 PROCEDURE — 95117 IMMUNOTHERAPY INJECTIONS: CPT | Performed by: ALLERGY & IMMUNOLOGY

## 2024-03-13 PROCEDURE — INJT2 ALLERGY IMMUNOTHERAPY 2+ INJECTIONS: Performed by: ALLERGY & IMMUNOLOGY

## 2024-03-14 DIAGNOSIS — G62.9 NEUROPATHY: Primary | ICD-10-CM

## 2024-03-14 DIAGNOSIS — D47.3 ESSENTIAL THROMBOCYTHEMIA (MULTI): Primary | ICD-10-CM

## 2024-03-14 DIAGNOSIS — D47.3 ESSENTIAL THROMBOCYTHEMIA (MULTI): ICD-10-CM

## 2024-03-14 RX ORDER — HYDROXYUREA 500 MG/1
500 CAPSULE ORAL 2 TIMES DAILY
Qty: 28 CAPSULE | Refills: 6 | Status: SHIPPED | OUTPATIENT
Start: 2024-03-14 | End: 2024-05-30 | Stop reason: SDUPTHER

## 2024-03-14 RX ORDER — HYDROXYUREA 500 MG/1
500 CAPSULE ORAL DAILY
Qty: 14 CAPSULE | Refills: 6 | Status: SHIPPED | OUTPATIENT
Start: 2024-03-14 | End: 2024-03-14 | Stop reason: SDUPTHER

## 2024-03-14 RX ORDER — GABAPENTIN 300 MG/1
300 CAPSULE ORAL DAILY
Qty: 30 CAPSULE | Refills: 11 | Status: SHIPPED | OUTPATIENT
Start: 2024-03-14 | End: 2025-03-14

## 2024-03-20 ENCOUNTER — CLINICAL SUPPORT (OUTPATIENT)
Dept: ALLERGY | Facility: HOSPITAL | Age: 39
End: 2024-03-20
Payer: COMMERCIAL

## 2024-03-20 VITALS — HEART RATE: 91 BPM | TEMPERATURE: 97.9 F | SYSTOLIC BLOOD PRESSURE: 121 MMHG | DIASTOLIC BLOOD PRESSURE: 76 MMHG

## 2024-03-20 DIAGNOSIS — J30.1 SEASONAL ALLERGIC RHINITIS DUE TO POLLEN: ICD-10-CM

## 2024-03-20 PROCEDURE — INJT2 ALLERGY IMMUNOTHERAPY 2+ INJECTIONS: Performed by: ALLERGY & IMMUNOLOGY

## 2024-03-20 PROCEDURE — 95117 IMMUNOTHERAPY INJECTIONS: CPT | Performed by: ALLERGY & IMMUNOLOGY

## 2024-03-27 ENCOUNTER — CLINICAL SUPPORT (OUTPATIENT)
Dept: ALLERGY | Facility: HOSPITAL | Age: 39
End: 2024-03-27
Payer: COMMERCIAL

## 2024-03-27 VITALS
HEART RATE: 93 BPM | SYSTOLIC BLOOD PRESSURE: 125 MMHG | OXYGEN SATURATION: 100 % | RESPIRATION RATE: 16 BRPM | TEMPERATURE: 98.2 F | DIASTOLIC BLOOD PRESSURE: 77 MMHG

## 2024-03-27 DIAGNOSIS — J30.1 SEASONAL ALLERGIC RHINITIS DUE TO POLLEN: ICD-10-CM

## 2024-03-27 PROCEDURE — 95117 IMMUNOTHERAPY INJECTIONS: CPT | Performed by: ALLERGY & IMMUNOLOGY

## 2024-03-27 PROCEDURE — INJT2 ALLERGY IMMUNOTHERAPY 2+ INJECTIONS: Performed by: ALLERGY & IMMUNOLOGY

## 2024-04-10 ENCOUNTER — CLINICAL SUPPORT (OUTPATIENT)
Dept: ALLERGY | Facility: HOSPITAL | Age: 39
End: 2024-04-10
Payer: COMMERCIAL

## 2024-04-10 VITALS
DIASTOLIC BLOOD PRESSURE: 74 MMHG | HEART RATE: 86 BPM | TEMPERATURE: 98 F | SYSTOLIC BLOOD PRESSURE: 113 MMHG | RESPIRATION RATE: 16 BRPM

## 2024-04-10 DIAGNOSIS — J30.1 SEASONAL ALLERGIC RHINITIS DUE TO POLLEN: ICD-10-CM

## 2024-04-10 PROCEDURE — 95117 IMMUNOTHERAPY INJECTIONS: CPT | Performed by: ALLERGY & IMMUNOLOGY

## 2024-04-10 PROCEDURE — INJT2 ALLERGY IMMUNOTHERAPY 2+ INJECTIONS: Performed by: ALLERGY & IMMUNOLOGY

## 2024-04-10 NOTE — PROGRESS NOTES
Lillian here today for Veena's regularly scheduled immunotherapy injection, per protocol. Patient in good state of health, received Veena's shot as planned, as recorded in flowsheet for this encounter, waited for 30 minutes after Veena's injection and left the office after that still at their baseline state of health. Will continue allergy immunotherapy as planned and call us in case any symptoms or reaction from today's injection are noted.

## 2024-04-15 DIAGNOSIS — J30.81 ALLERGIC RHINITIS DUE TO ANIMAL DANDER: ICD-10-CM

## 2024-04-15 DIAGNOSIS — J30.1 SEASONAL ALLERGIC RHINITIS DUE TO POLLEN: Primary | ICD-10-CM

## 2024-04-15 DIAGNOSIS — J30.89 ALLERGIC RHINITIS DUE TO MOLD: ICD-10-CM

## 2024-04-18 ENCOUNTER — APPOINTMENT (OUTPATIENT)
Dept: HEMATOLOGY/ONCOLOGY | Facility: HOSPITAL | Age: 39
End: 2024-04-18
Payer: COMMERCIAL

## 2024-04-19 DIAGNOSIS — J30.81 ALLERGIC RHINITIS DUE TO ANIMAL DANDER: ICD-10-CM

## 2024-04-19 DIAGNOSIS — J30.1 SEASONAL ALLERGIC RHINITIS DUE TO POLLEN: Primary | ICD-10-CM

## 2024-04-19 DIAGNOSIS — J30.89 ALLERGIC RHINITIS DUE TO MOLD: ICD-10-CM

## 2024-04-19 PROCEDURE — 95165 ANTIGEN THERAPY SERVICES: CPT | Performed by: ALLERGY & IMMUNOLOGY

## 2024-04-19 NOTE — PROGRESS NOTES
Allergen immunotherapy vials compounded in [facility] pharmacy. Maintenance vials compounded for pollen, animal dander, dust mite and mold. Vial prescriptions are available in EMR    Vial 1: pollen, animal dander and normal saline diluent.   Vial 2: dust mite, mold and normal saline diluent.     Each vial contains one 5 ml maintenance vial (1:1) prepared with normal saline using aseptic technique    Eliud Herron D

## 2024-05-09 ENCOUNTER — APPOINTMENT (OUTPATIENT)
Dept: HEMATOLOGY/ONCOLOGY | Facility: HOSPITAL | Age: 39
End: 2024-05-09
Payer: COMMERCIAL

## 2024-05-12 ENCOUNTER — PATIENT MESSAGE (OUTPATIENT)
Dept: BEHAVIORAL HEALTH | Facility: CLINIC | Age: 39
End: 2024-05-12
Payer: COMMERCIAL

## 2024-05-12 DIAGNOSIS — F31.9 BIPOLAR 1 DISORDER (MULTI): ICD-10-CM

## 2024-05-12 RX ORDER — BUPROPION HYDROCHLORIDE 150 MG/1
150 TABLET, EXTENDED RELEASE ORAL DAILY
Qty: 30 TABLET | Refills: 2 | Status: SHIPPED | OUTPATIENT
Start: 2024-05-12 | End: 2024-08-10

## 2024-05-14 ENCOUNTER — CLINICAL SUPPORT (OUTPATIENT)
Dept: ALLERGY | Facility: HOSPITAL | Age: 39
End: 2024-05-14
Payer: COMMERCIAL

## 2024-05-14 VITALS — DIASTOLIC BLOOD PRESSURE: 77 MMHG | SYSTOLIC BLOOD PRESSURE: 120 MMHG | TEMPERATURE: 97.7 F | HEART RATE: 97 BPM

## 2024-05-14 DIAGNOSIS — J30.81 ALLERGIC RHINITIS DUE TO ANIMAL DANDER: ICD-10-CM

## 2024-05-14 DIAGNOSIS — J30.1 SEASONAL ALLERGIC RHINITIS DUE TO POLLEN: ICD-10-CM

## 2024-05-14 DIAGNOSIS — J30.89 ALLERGIC RHINITIS DUE TO MOLD: ICD-10-CM

## 2024-05-14 PROCEDURE — INJT2 ALLERGY IMMUNOTHERAPY 2+ INJECTIONS: Performed by: STUDENT IN AN ORGANIZED HEALTH CARE EDUCATION/TRAINING PROGRAM

## 2024-05-14 PROCEDURE — 95117 IMMUNOTHERAPY INJECTIONS: CPT | Performed by: STUDENT IN AN ORGANIZED HEALTH CARE EDUCATION/TRAINING PROGRAM

## 2024-05-14 NOTE — PROGRESS NOTES
Lillian is here today for Veena's regularly scheduled immunotherapy injection per protocol. Patient denies recent illness, delayed reaction after last injection, or asthma exacerbation. Patient reports taking antihistimine before today's visit. Lungs clear and equal bilaterally. Patient received Veena's injection as recorded in flowsheet. Patient monitored for 30 minutes after injection and left the office at baseline state of health. Will continue allergy immunotherapy as planned. Patient instructed to call RN line if new symptoms or reaction from today's injection are noted.

## 2024-05-15 ENCOUNTER — OFFICE VISIT (OUTPATIENT)
Dept: PRIMARY CARE | Facility: CLINIC | Age: 39
End: 2024-05-15
Payer: COMMERCIAL

## 2024-05-15 VITALS
SYSTOLIC BLOOD PRESSURE: 118 MMHG | TEMPERATURE: 98 F | DIASTOLIC BLOOD PRESSURE: 75 MMHG | OXYGEN SATURATION: 99 % | HEART RATE: 84 BPM | HEIGHT: 66 IN | BODY MASS INDEX: 28.25 KG/M2 | RESPIRATION RATE: 18 BRPM | WEIGHT: 175.8 LBS

## 2024-05-15 DIAGNOSIS — R63.5 ABNORMAL WEIGHT GAIN: ICD-10-CM

## 2024-05-15 DIAGNOSIS — Z00.00 HEALTH MAINTENANCE EXAMINATION: Primary | ICD-10-CM

## 2024-05-15 DIAGNOSIS — F32.89 OTHER DEPRESSION: ICD-10-CM

## 2024-05-15 LAB — TSH SERPL-ACNC: 1.51 MIU/L (ref 0.44–3.98)

## 2024-05-15 PROCEDURE — G0439 PPPS, SUBSEQ VISIT: HCPCS | Performed by: FAMILY MEDICINE

## 2024-05-15 PROCEDURE — 84443 ASSAY THYROID STIM HORMONE: CPT | Performed by: FAMILY MEDICINE

## 2024-05-15 PROCEDURE — 99215 OFFICE O/P EST HI 40 MIN: CPT | Performed by: FAMILY MEDICINE

## 2024-05-15 PROCEDURE — 36415 COLL VENOUS BLD VENIPUNCTURE: CPT | Performed by: FAMILY MEDICINE

## 2024-05-15 PROCEDURE — 1036F TOBACCO NON-USER: CPT | Performed by: FAMILY MEDICINE

## 2024-05-15 ASSESSMENT — ENCOUNTER SYMPTOMS
OCCASIONAL FEELINGS OF UNSTEADINESS: 0
DEPRESSION: 0
LOSS OF SENSATION IN FEET: 0

## 2024-05-15 ASSESSMENT — PATIENT HEALTH QUESTIONNAIRE - PHQ9
1. LITTLE INTEREST OR PLEASURE IN DOING THINGS: SEVERAL DAYS
2. FEELING DOWN, DEPRESSED OR HOPELESS: SEVERAL DAYS
SUM OF ALL RESPONSES TO PHQ9 QUESTIONS 1 AND 2: 2

## 2024-05-15 ASSESSMENT — PAIN SCALES - GENERAL: PAINLEVEL: 0-NO PAIN

## 2024-05-15 NOTE — PROGRESS NOTES
"Laz Manriquez \"Veena\" is a 38 y.o. 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       Medicare wellness visit questionnaires completed and documented in express ju[facility] and below     Comprehensive Medical and Social History  Patient Active Problem List   Diagnosis    Abnormal weight gain    Acute pain    Bipolar 1 disorder (Multi)    Gender dysphoria    Migraine with aura    Seasonal allergic rhinitis due to pollen    Trauma and stressor-related disorder    Torsion of ovary, ovarian pedicle, or fallopian tube    Essential thrombocythemia (Multi)    Hypertriglyceridemia without hypercholesterolemia    Hirsutism    SCC (squamous cell carcinoma)    Oral candida    Pain in joint, ankle and foot    Panic attacks    Paranoia (psychosis) (Multi)    Patent foramen ovale (HHS-HCC)    Wound infection    Depression    MVP (mitral valve prolapse)    Mild intermittent asthma without complication (HHS-HCC)    GERD (gastroesophageal reflux disease)    Flank pain    Dyslexia    Calculus of ureter    Auditory hallucinations    Anxiety    Acquired equinus foot deformity    Acne    Abnormality of gait    Insomnia    Allergic rhinitis due to mold    Allergic rhinitis due to animal dander     Past Medical History:   Diagnosis Date    Acute upper respiratory infection, unspecified 02/24/2020    Acute URI    Encounter for examination of ears and hearing without abnormal findings 10/12/2015    Examination of " "ears and hearing    Flat foot (pes planus) (acquired), right foot 09/19/2017    Flat feet    Personal history of other (healed) physical injury and trauma 08/29/2018    History of contusion    Personal history of other diseases of the digestive system     History of gastroesophageal reflux (GERD)    Personal history of other diseases of the digestive system 12/18/2019    History of gastroesophageal reflux (GERD)    Personal history of other diseases of the female genital tract 10/25/2016    History of ovarian cyst    Personal history of other diseases of the respiratory system 02/24/2020    History of sore throat    Personal history of other diseases of the respiratory system 08/04/2020    History of tonsillitis    Personal history of other mental and behavioral disorders     History of depression    Personal history of other specified conditions 07/19/2020    History of fever     Past Surgical History:   Procedure Laterality Date    OTHER SURGICAL HISTORY  01/16/2019    Leg surgery     Allergies   Allergen Reactions    Lactose Diarrhea     \"mouth foams up\"    Cat Dander Unknown    Erythromycin Unknown and Other     extreme stomach cramps. N/V    Stomach cramps    Dog Dander Hives, Itching, Rash and Swelling    House Dust Mite Hives, Itching and Rash    Hydrocodone-Acetaminophen Hives, Itching and Rash    Mold Cough, Hives, Itching, Rash and Swelling    Tree Pollen-Glide, Red Hives, Itching, Rash and Swelling     Current Outpatient Medications   Medication Sig Dispense Refill    albuterol 90 mcg/actuation inhaler Inhale 2 puffs see administration instructions. EVERY 4-6 HOURS AS NEEDED.      aspirin 81 mg EC tablet Take 1 tablet (81 mg) by mouth 1 time.      buPROPion SR (Wellbutrin SR) 150 mg 12 hr tablet Take 1 tablet (150 mg) by mouth once daily. Do not crush, chew, or split. 30 tablet 2    cholecalciferol (Vitamin D-3) 25 MCG (1000 UT) tablet Take 2 tablets (2,000 Units) by mouth once daily.      clobetasol " (Temovate) 0.05 % ointment Apply 1 Application topically if needed. APPLY TO MOSQUITO BITES AS NEEDED      docusate sodium (Colace) 100 mg capsule Take 1 capsule (100 mg) by mouth twice a day.      fexofenadine (Allegra) 180 mg tablet Take 1 tablet (180 mg) by mouth once daily.      fluticasone (Flonase) 50 mcg/actuation nasal spray 1 spray by Does not apply route once daily.      gabapentin (Neurontin) 300 mg capsule Take 1 capsule (300 mg) by mouth once daily. 30 capsule 11    hydroxyurea (Hydrea) 500 mg capsule Take 1 capsule (500 mg total) by mouth 2 times a day.  Take at the same time each day. 28 capsule 6    inhaler, assist devices (E-Z SPACER MISC) 1 Units see administration instructions. USE WITH INHALER AS DIRECTED      levocetirizine (Xyzal) 5 mg tablet Take 1 tablet (5 mg) by mouth once daily. 30 tablet 11    triamcinolone (Kenalog) 0.1 % ointment Apply 1 Application topically if needed (APPLY ONE APPLICATION TOPICALLY ON ARMS/LEGS DAILY AS NEEDED FOR DRY AREAS AND FOR DARK BUMPS).      acetaminophen (Tylenol) 500 mg tablet Take 1-2 tablets (500-1,000 mg) by mouth every 6 hours if needed for mild pain (1 - 3).      escitalopram (Lexapro) 10 mg tablet Take 1 tablet (10 mg) by mouth once daily. 30 tablet 2    gabapentin (Neurontin) 300 mg capsule Take 1 capsule (300 mg) by mouth once daily. 30 capsule 2    hydrocortisone 2.5 % cream Apply topically once daily. 30 g 5    lamoTRIgine (LaMICtal) 100 mg tablet Take 1 tablet (100 mg) by mouth once daily. 30 tablet 2    prazosin (Minipress) 1 mg capsule Take 1-3 capsules (1-3 mg) by mouth once daily at bedtime. 90 capsule 2    spironolactone (Aldactone) 50 mg tablet Take 1 tablet (50 mg) by mouth once daily. 30 tablet 2    topiramate (Topamax) 50 mg tablet Take 1 tablet (50 mg) by mouth once daily at bedtime. 30 tablet 2     Current Facility-Administered Medications   Medication Dose Route Frequency Provider Last Rate Last Admin    ALLERGEN REFILL VIAL 1 & 2    "subcutaneous MARY ANNE Morales MD         Social History     Socioeconomic History    Marital status: Single     Spouse name: None    Number of children: None    Years of education: None    Highest education level: None   Occupational History    None   Tobacco Use    Smoking status: Never    Smokeless tobacco: Never   Substance and Sexual Activity    Alcohol use: Never    Drug use: Never    Sexual activity: None   Other Topics Concern    None   Social History Narrative    None     Social Determinants of Health     Financial Resource Strain: Not on file   Food Insecurity: Not on file   Transportation Needs: Not on file   Physical Activity: Not on file   Stress: Not on file   Social Connections: Not on file   Intimate Partner Violence: Not on file   Housing Stability: Not on file       Activities of Daily Living  In your present state of health, do you have any difficulty performing the following activities?:   Preparing food and eating?: No  Bathing yourself: No  Getting dressed: No  Using the toilet:No  Moving around from place to place: No  In the past year have you fallen or had a near fall?:No    IADLS: No issues managing finances, grocery shopping, taking medication or doing housework    Depression Screen  (Note: if answer to either of the following is \"Yes\", then a more complete depression screening is indicated)   Q1: Over the past two weeks, have you felt down, depressed or hopeless?  Y  Q2: Over the past two weeks, have you felt little interest or pleasure in doing things?  Y    PHQ9=18    Current exercise habits:  Exercises 3 times a week, plus walks daily    Dietary issues discussed: see above   Hearing difficulties: No  Safe in current home environment: yes    Review of Systems  10 point review of systems negative except as noted in HPI.    Objective   Blood pressure 118/75, pulse 84, temperature 36.7 °C (98 °F), temperature source Skin, resp. rate 18, height 1.702 m (5' 7\"), weight 79.7 kg (175 lb " 12.8 oz), SpO2 99%.  Body mass index is 27.53 kg/m².  General: well appearing, no distress  Neck: No lymphadenopathy, no thyromegaly  CV: Regular rate and rhythm, no murmur  Lungs: Clear to auscultation bilaterally  Abdomen: Soft, nontender, nondistended  Extremities: No edema noted  Psych: Appropriate mood and affect        Assessment/Plan   39 yo here for follow-up    Health maintenance  - Completed medicare wellness questionnaires, no concerns. Up to date on health maintenance    Depression  - Has appointment with psychiatry tomorrow. Continue care per psychiatry and counseling    Weight gain  - Check TSH  - Consider increase topiramate  - Continue to work on healthy diet without overly restricting    RTC 3 months

## 2024-05-16 ENCOUNTER — TELEMEDICINE (OUTPATIENT)
Dept: BEHAVIORAL HEALTH | Facility: CLINIC | Age: 39
End: 2024-05-16
Payer: COMMERCIAL

## 2024-05-16 DIAGNOSIS — F31.9 BIPOLAR 1 DISORDER (MULTI): ICD-10-CM

## 2024-05-16 DIAGNOSIS — F41.9 ANXIETY: ICD-10-CM

## 2024-05-16 DIAGNOSIS — F43.9 TRAUMA AND STRESSOR-RELATED DISORDER: ICD-10-CM

## 2024-05-16 PROCEDURE — 99214 OFFICE O/P EST MOD 30 MIN: CPT | Performed by: PSYCHIATRY & NEUROLOGY

## 2024-05-16 RX ORDER — TOPIRAMATE 50 MG/1
50 TABLET, FILM COATED ORAL NIGHTLY
Qty: 30 TABLET | Refills: 2 | Status: SHIPPED | OUTPATIENT
Start: 2024-05-16 | End: 2024-08-14

## 2024-05-16 RX ORDER — HYDROXYZINE HYDROCHLORIDE 10 MG/1
10 TABLET, FILM COATED ORAL 3 TIMES DAILY PRN
Qty: 90 TABLET | Refills: 0 | Status: SHIPPED | OUTPATIENT
Start: 2024-05-16 | End: 2024-06-15

## 2024-05-16 RX ORDER — LAMOTRIGINE 100 MG/1
100 TABLET ORAL DAILY
Qty: 30 TABLET | Refills: 2 | Status: SHIPPED | OUTPATIENT
Start: 2024-05-16 | End: 2024-08-14

## 2024-05-16 RX ORDER — BUSPIRONE HYDROCHLORIDE 5 MG/1
5 TABLET ORAL 2 TIMES DAILY
Qty: 60 TABLET | Refills: 1 | Status: SHIPPED | OUTPATIENT
Start: 2024-05-16 | End: 2024-07-15

## 2024-05-16 RX ORDER — PRAZOSIN HYDROCHLORIDE 1 MG/1
1-3 CAPSULE ORAL NIGHTLY
Qty: 90 CAPSULE | Refills: 2 | Status: SHIPPED | OUTPATIENT
Start: 2024-05-16 | End: 2024-08-14

## 2024-05-16 ASSESSMENT — ENCOUNTER SYMPTOMS
DYSPHORIC MOOD: 1
SLEEP DISTURBANCE: 1
NERVOUS/ANXIOUS: 0

## 2024-05-16 ASSESSMENT — PATIENT HEALTH QUESTIONNAIRE - PHQ9
1. LITTLE INTEREST OR PLEASURE IN DOING THINGS: SEVERAL DAYS
6. FEELING BAD ABOUT YOURSELF - OR THAT YOU ARE A FAILURE OR HAVE LET YOURSELF OR YOUR FAMILY DOWN: MORE THAN HALF THE DAYS
2. FEELING DOWN, DEPRESSED OR HOPELESS: MORE THAN HALF THE DAYS
3. TROUBLE FALLING OR STAYING ASLEEP OR SLEEPING TOO MUCH: MORE THAN HALF THE DAYS
SUM OF ALL RESPONSES TO PHQ QUESTIONS 1-9: 16
7. TROUBLE CONCENTRATING ON THINGS, SUCH AS READING THE NEWSPAPER OR WATCHING TELEVISION: NEARLY EVERY DAY
4. FEELING TIRED OR HAVING LITTLE ENERGY: MORE THAN HALF THE DAYS
5. POOR APPETITE OR OVEREATING: NEARLY EVERY DAY
8. MOVING OR SPEAKING SO SLOWLY THAT OTHER PEOPLE COULD HAVE NOTICED. OR THE OPPOSITE, BEING SO FIGETY OR RESTLESS THAT YOU HAVE BEEN MOVING AROUND A LOT MORE THAN USUAL: NOT AT ALL
9. THOUGHTS THAT YOU WOULD BE BETTER OFF DEAD, OR OF HURTING YOURSELF: SEVERAL DAYS
10. IF YOU CHECKED OFF ANY PROBLEMS, HOW DIFFICULT HAVE THESE PROBLEMS MADE IT FOR YOU TO DO YOUR WORK, TAKE CARE OF THINGS AT HOME, OR GET ALONG WITH OTHER PEOPLE: EXTREMELY DIFFICULT
SUM OF ALL RESPONSES TO PHQ9 QUESTIONS 1 AND 2: 3

## 2024-05-16 NOTE — PROGRESS NOTES
"Adult Ambulatory Psychiatry Progress Note      Assessment/Plan     Impression:  Lillian Manriquez \"Shakeel" is a 38 y.o. nonbinary domiciled alone, employed at [employer] who presents for follow up with CC of Bipolar, Insomnia, and Anxiety . Coordinating care with PCP. Reviewed notes and labs of other providers.       Plan:      Bipolar d/o - lamotrigine 100mg daily, bupropion SR 150mg daily  Trauma/anxiety - start buspar 5mg BID, stop escitalopram, hydroxyzine 10mg TID prn panic, prazosin 1-3mg at bedtime, c/w med ed, psycho ed, supportive psychotherapy to build therapeutic rapport, f/u 2 months  Insomnia - melatonin 5mg at bedtime prn  Weight gain - topiramate 50mg qhs      Subjective   HPI:  Pt arrived on time. They're taking the medicine but they haven't been consistent which is affecting their mood. Denies SI in last few weeks. Their sleep schedule is inconsistent so they sleep through when they should be taking their medicine. Discussed importance of taking lamotrigine. Advised it is unlikely to affect sleep/wake cycles and to take it when they wake regardless of time of day. They're gaining more weight. Discussed stopping escitalopram to eliminate psychiatric medications contributing to weight gain. Discussed minor weight gain SE of gabapentin as well. 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 Their boss is mean to them and frightens them. The anxiety is affecting their sleep. They're tired during the day and having trouble staying awake while driving. They were told it was a job they would do without site visits. They are looking for other employment.           Review of Systems   Psychiatric/Behavioral:  Positive for dysphoric mood and sleep disturbance. Negative for suicidal ideas. The patient is not nervous/anxious.          Objective   Mental Status Exam:       Vitals:  There were no vitals filed for this visit.    Current Medications:  Current Outpatient Medications on " File Prior to Visit   Medication Sig Dispense Refill    acetaminophen (Tylenol) 500 mg tablet Take 1-2 tablets (500-1,000 mg) by mouth every 6 hours if needed for mild pain (1 - 3).      albuterol 90 mcg/actuation inhaler Inhale 2 puffs see administration instructions. EVERY 4-6 HOURS AS NEEDED.      aspirin 81 mg EC tablet Take 1 tablet (81 mg) by mouth 1 time.      buPROPion SR (Wellbutrin SR) 150 mg 12 hr tablet Take 1 tablet (150 mg) by mouth once daily. Do not crush, chew, or split. 30 tablet 2    cholecalciferol (Vitamin D-3) 25 MCG (1000 UT) tablet Take 2 tablets (2,000 Units) by mouth once daily.      clobetasol (Temovate) 0.05 % ointment Apply 1 Application topically if needed. APPLY TO MOSQUITO BITES AS NEEDED      docusate sodium (Colace) 100 mg capsule Take 1 capsule (100 mg) by mouth twice a day.      escitalopram (Lexapro) 10 mg tablet Take 1 tablet (10 mg) by mouth once daily. 30 tablet 2    fexofenadine (Allegra) 180 mg tablet Take 1 tablet (180 mg) by mouth once daily.      fluticasone (Flonase) 50 mcg/actuation nasal spray 1 spray by Does not apply route once daily.      gabapentin (Neurontin) 300 mg capsule Take 1 capsule (300 mg) by mouth once daily. 30 capsule 2    gabapentin (Neurontin) 300 mg capsule Take 1 capsule (300 mg) by mouth once daily. 30 capsule 11    hydrocortisone 2.5 % cream Apply topically once daily. 30 g 5    hydroxyurea (Hydrea) 500 mg capsule Take 1 capsule (500 mg total) by mouth 2 times a day.  Take at the same time each day. 28 capsule 6    inhaler, assist devices (E-Z SPACER MISC) 1 Units see administration instructions. USE WITH INHALER AS DIRECTED      lamoTRIgine (LaMICtal) 100 mg tablet Take 1 tablet (100 mg) by mouth once daily. 30 tablet 2    levocetirizine (Xyzal) 5 mg tablet Take 1 tablet (5 mg) by mouth once daily. 30 tablet 11    prazosin (Minipress) 1 mg capsule Take 1-3 capsules (1-3 mg) by mouth once daily at bedtime. 90 capsule 2    spironolactone  (Aldactone) 50 mg tablet Take 1 tablet (50 mg) by mouth once daily. 30 tablet 2    topiramate (Topamax) 50 mg tablet Take 1 tablet (50 mg) by mouth once daily at bedtime. 30 tablet 2    triamcinolone (Kenalog) 0.1 % ointment Apply 1 Application topically if needed (APPLY ONE APPLICATION TOPICALLY ON ARMS/LEGS DAILY AS NEEDED FOR DRY AREAS AND FOR DARK BUMPS).      [DISCONTINUED] buPROPion SR (Wellbutrin SR) 150 mg 12 hr tablet Take 1 tablet (150 mg) by mouth once daily. Do not crush, chew, or split. 30 tablet 2     Current Facility-Administered Medications on File Prior to Visit   Medication Dose Route Frequency Provider Last Rate Last Admin    ALLERGEN REFILL VIAL 1 & 2   subcutaneous PRN Princess TANIA Morales MD           Lab Review:   Office Visit on 05/15/2024   Component Date Value    Thyroid Stimulating Horm* 05/15/2024 1.51        Orders:  Diagnoses and all orders for this visit:  Anxiety  -     busPIRone (Buspar) 5 mg tablet; Take 1 tablet (5 mg) by mouth 2 times a day.  -     hydrOXYzine HCL (Atarax) 10 mg tablet; Take 1 tablet (10 mg) by mouth 3 times a day as needed for anxiety.      PHQ9  Over the past 2 weeks, how often have you been bothered by any of the following problems?  Little interest or pleasure in doing things: Several days  Feeling down, depressed, or hopeless: More than half the days  Trouble falling or staying asleep, or sleeping too much: More than half the days  Feeling tired or having little energy: More than half the days  Poor appetite or overeating: Nearly every day  Feeling bad about yourself - or that you are a failure or have let yourself or your family down: More than half the days  Trouble concentrating on things, such as reading the newspaper or watching television: Nearly every day  Moving or speaking so slowly that other people could have noticed? Or the opposite - being so fidgety or restless that you have been moving around a lot more than usual.: Not at all  Thoughts that you  would be better off dead or hurting yourself in some way: Several days  Patient Health Questionnaire-9 Score: 16          Next Appointment:  No follow-ups on file.

## 2024-05-30 ENCOUNTER — LAB (OUTPATIENT)
Dept: LAB | Facility: HOSPITAL | Age: 39
End: 2024-05-30
Payer: COMMERCIAL

## 2024-05-30 ENCOUNTER — OFFICE VISIT (OUTPATIENT)
Dept: HEMATOLOGY/ONCOLOGY | Facility: HOSPITAL | Age: 39
End: 2024-05-30
Payer: COMMERCIAL

## 2024-05-30 VITALS
OXYGEN SATURATION: 100 % | HEART RATE: 72 BPM | TEMPERATURE: 97.3 F | SYSTOLIC BLOOD PRESSURE: 128 MMHG | BODY MASS INDEX: 27.54 KG/M2 | DIASTOLIC BLOOD PRESSURE: 72 MMHG | RESPIRATION RATE: 18 BRPM | WEIGHT: 170.64 LBS

## 2024-05-30 DIAGNOSIS — D47.3 ESSENTIAL THROMBOCYTHEMIA (MULTI): ICD-10-CM

## 2024-05-30 DIAGNOSIS — Z31.41 FERTILITY TESTING: ICD-10-CM

## 2024-05-30 DIAGNOSIS — R52 CHRONIC GENERALIZED PAIN: ICD-10-CM

## 2024-05-30 DIAGNOSIS — D47.3 ESSENTIAL THROMBOCYTHEMIA (MULTI): Primary | ICD-10-CM

## 2024-05-30 DIAGNOSIS — G62.9 NEUROPATHY: ICD-10-CM

## 2024-05-30 DIAGNOSIS — G89.29 CHRONIC GENERALIZED PAIN: ICD-10-CM

## 2024-05-30 LAB
ALBUMIN SERPL BCP-MCNC: 4.6 G/DL (ref 3.4–5)
ALP SERPL-CCNC: 69 U/L (ref 33–120)
ALT SERPL W P-5'-P-CCNC: 16 U/L (ref 7–52)
ANION GAP SERPL CALC-SCNC: 14 MMOL/L (ref 10–20)
AST SERPL W P-5'-P-CCNC: 19 U/L (ref 9–39)
BASOPHILS # BLD AUTO: 0.02 X10*3/UL (ref 0–0.1)
BASOPHILS NFR BLD AUTO: 0.3 %
BILIRUB SERPL-MCNC: 0.6 MG/DL (ref 0–1.2)
BUN SERPL-MCNC: 11 MG/DL (ref 6–23)
CALCIUM SERPL-MCNC: 9.8 MG/DL (ref 8.6–10.3)
CHLORIDE SERPL-SCNC: 104 MMOL/L (ref 98–107)
CO2 SERPL-SCNC: 24 MMOL/L (ref 21–32)
CREAT SERPL-MCNC: 1.07 MG/DL (ref 0.5–1.3)
EGFRCR SERPLBLD CKD-EPI 2021: 68 ML/MIN/1.73M*2
EOSINOPHIL # BLD AUTO: 0.04 X10*3/UL (ref 0–0.7)
EOSINOPHIL NFR BLD AUTO: 0.7 %
ERYTHROCYTE [DISTWIDTH] IN BLOOD BY AUTOMATED COUNT: 11.4 % (ref 11.5–14.5)
ESTRADIOL SERPL-MCNC: 182 PG/ML
FSH SERPL-ACNC: 2.2 IU/L
GLUCOSE SERPL-MCNC: 92 MG/DL (ref 74–99)
HCT VFR BLD AUTO: 35.6 % (ref 36–52)
HGB BLD-MCNC: 12.8 G/DL (ref 12–17.5)
HOLD SPECIMEN: NORMAL
HOLD SPECIMEN: NORMAL
IMM GRANULOCYTES # BLD AUTO: 0.02 X10*3/UL (ref 0–0.7)
IMM GRANULOCYTES NFR BLD AUTO: 0.3 % (ref 0–0.9)
LH SERPL-ACNC: 1.9 IU/L
LYMPHOCYTES # BLD AUTO: 2.95 X10*3/UL (ref 1.2–4.8)
LYMPHOCYTES NFR BLD AUTO: 51.4 %
MCH RBC QN AUTO: 37.1 PG (ref 26–34)
MCHC RBC AUTO-ENTMCNC: 36 G/DL (ref 32–36)
MCV RBC AUTO: 103 FL (ref 80–100)
MONOCYTES # BLD AUTO: 0.34 X10*3/UL (ref 0.1–1)
MONOCYTES NFR BLD AUTO: 5.9 %
NEUTROPHILS # BLD AUTO: 2.37 X10*3/UL (ref 1.2–7.7)
NEUTROPHILS NFR BLD AUTO: 41.4 %
NRBC BLD-RTO: 0 /100 WBCS (ref 0–0)
PLATELET # BLD AUTO: 509 X10*3/UL (ref 150–450)
POTASSIUM SERPL-SCNC: 3.9 MMOL/L (ref 3.5–5.3)
PROGEST SERPL-MCNC: 6.5 NG/ML
PROT SERPL-MCNC: 7.8 G/DL (ref 6.4–8.2)
RBC # BLD AUTO: 3.45 X10*6/UL (ref 4–5.9)
SODIUM SERPL-SCNC: 138 MMOL/L (ref 136–145)
WBC # BLD AUTO: 5.7 X10*3/UL (ref 4.4–11.3)

## 2024-05-30 PROCEDURE — 84144 ASSAY OF PROGESTERONE: CPT

## 2024-05-30 PROCEDURE — 36415 COLL VENOUS BLD VENIPUNCTURE: CPT

## 2024-05-30 PROCEDURE — 99214 OFFICE O/P EST MOD 30 MIN: CPT | Performed by: PHYSICIAN ASSISTANT

## 2024-05-30 PROCEDURE — 83002 ASSAY OF GONADOTROPIN (LH): CPT

## 2024-05-30 PROCEDURE — 80053 COMPREHEN METABOLIC PANEL: CPT

## 2024-05-30 PROCEDURE — 85025 COMPLETE CBC W/AUTO DIFF WBC: CPT

## 2024-05-30 PROCEDURE — 83520 IMMUNOASSAY QUANT NOS NONAB: CPT

## 2024-05-30 PROCEDURE — 83001 ASSAY OF GONADOTROPIN (FSH): CPT

## 2024-05-30 PROCEDURE — 82670 ASSAY OF TOTAL ESTRADIOL: CPT

## 2024-05-30 RX ORDER — HYDROXYUREA 500 MG/1
1000 CAPSULE ORAL DAILY
Qty: 180 CAPSULE | Refills: 1 | Status: SHIPPED | OUTPATIENT
Start: 2024-05-30

## 2024-05-30 ASSESSMENT — PAIN SCALES - GENERAL: PAINLEVEL: 6

## 2024-05-30 NOTE — PROGRESS NOTES
"Patient ID: Lillian Manriquez \"Veena\" is a 38 y.o. adult.  Referring Physician: No referring provider defined for this encounter.  Primary Care Provider: Emmy Alcantara MD  Visit Type: Follow Up    Location: [facility] - Main  Diagnosis/Reason: JAK2+ ET    Interval Hx:  5/30/24  Lillian Manriquez is a 38 y.o. adult following up today for JAK2+ ET    NOTE:  Patient assigned female at birth and is transgender non-binary who prefers \"they/them/their\" as pronouns  5/30/24 - Patient was previously followed by TRAVON Peng but is now transferring care to me effective today. Their last visit was 12/14/23 and it was noted that patient has had compliance issues w/ daily adherence to Hydrea therapy (last noted 10/1-10/15/23 during patient's trip to [facility]). As of 12/14/23 it was noted that patient reported she had been taking Hydrea daily without adverse effect or reaction. The dose had been decreased from 1000mg to 500mg approximately 2 week prior to that visit (12/14/23)    Today she c/o chest tightness associated w/ episodes of increased anxiety which is currently treated by psychology and therapist.    Patient denies weight loss, abnormal bruising and bleeding, hematuria, blood in stool, dark/black stools, epistaxis, oral/gingival bleeding, lymphadenopathy, recurrent infections, recurrent fevers, night sweats, early satiety, abdominal pain, bone pain, chest pain, palpitations, SOB, ANSARI, fatigue, dizziness, lightheadedness, PICA.    Relevant Hx:  12/14/23 - Last Visit w/ H. TRAVON Rodriguez  Location:  [facility]  Dx: JAK2+ ET  Tx: hydrea, asa     Lillian is a 38 y/o female assigned at birth (transgender non-binary who prefers \"they/them/their\" pronouns) presenting for follow-up of JAK2+ ET.      Presents 11/30/23 for followup. Lost to followup since last appt 5/2023.     Reports healing issues last 8mo from gender reassignment surgery - reports is leaking pus - is going to meet with a new surgeon and possibly get " reverse surgery to fix the issues  Oct 1st-15th - went to [place] and was unable to take Hydrea then as it was confiscated and thrown out per pt. Took ASA while in [place] (only had 500mg capsules available there)  Taking Hydrea daily w/o issue - did not miss any other doses.  Has been taking Hydea 2 caps daily and ASA daily. SE hyperpigmentation. Reports chronic burning of feet and hands bilaterally had resolved overall - on gabapentin 300mg at bedtime  w/o issue but pain is back since out of gabapentin for the last 2mo.    DVT prophyllaxis periop and for 14 days post op with lovenox 40mg daily sc   Plan for surgery 1/31 at [facility] - liposuction and scar revision on breasts     12/14/23 - Here for followup appt after decreasing Hydrea from 1,000mg daily to 500mg daily 2 weeks ago -- no issues with change. Restarted Gabapentin and has not noticed much improvement in burning yet. To get surgery tomorrow now at [facility]. Had site of infection cultured and is to start antibx after surgery. Is going to transfer all care to [facility] - will see hematologist Dr. MAC Correa Dec 21st      The patient has not noted fever, chills, drenching night sweats, weight loss, bone pain, hemorrhage from any site, melena, or respiratory symptoms. No new issues.      No personal history of stroke, MI, or clot.     Completed following surgeries:  hysterectomy (Dr. Galina Mccann) and mastectomy at [facility] with Dr. River Lackey, and SRS (sexual reassignment sx) - stage 1 ant abdominal phalloplasty 3/2022; stage 2a phalloplasty 9/2022; staged urethroplasty with buccal mucosal graft and preputial graft,  clitoral transposition, L clitoral nerve neurolysis and avance nerve grafting,  clarix interposition, complex wound closure w/ tubularization of neophallus with Dr. Mario Montes on 5/10/23. Had recent emergency surgery- s/p laparoscopic R oophorectomy on 12/10/22 for ovarian torsion.    PMHx:  Active Ambulatory Problems     Diagnosis Date Noted    Abnormal weight  gain 02/15/2023    Acute pain 02/15/2023    Bipolar 1 disorder (Multi) 02/15/2023    Gender dysphoria 02/15/2023    Migraine with aura 08/11/2010    Seasonal allergic rhinitis due to pollen 02/15/2023    Trauma and stressor-related disorder 02/15/2023    Torsion of ovary, ovarian pedicle, or fallopian tube 02/15/2023    Essential thrombocythemia (Multi) 03/29/2023    Hypertriglyceridemia without hypercholesterolemia 04/28/2023    Hirsutism 04/28/2023    SCC (squamous cell carcinoma) 08/19/2023    Oral candida 08/19/2023    Pain in joint, ankle and foot 07/29/2011    Panic attacks 08/12/2014    Paranoia (psychosis) (Multi) 08/11/2010    Patent foramen ovale ([facility]) 09/20/2010    Wound infection 08/19/2023    Depression 08/11/2010    MVP (mitral valve prolapse) 09/20/2010    Mild intermittent asthma without complication ([facility]) 08/19/2023    GERD (gastroesophageal reflux disease) 06/27/2011    Flank pain 01/03/2013    Dyslexia 08/11/2010    Calculus of ureter 01/03/2013    Auditory hallucinations 09/20/2010    Anxiety 08/11/2010    Acquired equinus foot deformity 08/11/2010    Acne 11/29/2010    Abnormality of gait 09/17/2010    Insomnia 12/01/2023    Allergic rhinitis due to mold 04/15/2024    Allergic rhinitis due to animal dander 04/15/2024     Resolved Ambulatory Problems     Diagnosis Date Noted    Acute bronchitis 02/15/2023    Acute diarrhea 02/15/2023    Acute lower urinary tract infection 02/15/2023    Dyspareunia, female 02/15/2023    Essential thrombocytosis (Multi) 02/15/2023    Heavy menses 02/15/2023    Vaginal pain 02/15/2023     Past Medical History:   Diagnosis Date    Acute upper respiratory infection, unspecified 02/24/2020    Encounter for examination of ears and hearing without abnormal findings 10/12/2015    Flat foot (pes planus) (acquired), right foot 09/19/2017    Personal history of other (healed) physical injury and trauma 08/29/2018    Personal history of other diseases of the digestive  "system     Personal history of other diseases of the digestive system 12/18/2019    Personal history of other diseases of the female genital tract 10/25/2016    Personal history of other diseases of the respiratory system 02/24/2020    Personal history of other diseases of the respiratory system 08/04/2020    Personal history of other mental and behavioral disorders     Personal history of other specified conditions 07/19/2020     PSHx:  Past Surgical History:   Procedure Laterality Date    OTHER SURGICAL HISTORY  01/16/2019    Leg surgery      Hysterectomy (Dr. Galina Mccann)  Mastectomy at [facility] with Dr. River Lackey,   [facility] (sexual reassignment sx)  Stage 1 ant abdominal phalloplasty 3/2022;   Stage 2a phalloplasty 9/2022;   Staged urethroplasty with buccal mucosal graft and preputial graft, clitoral transposition, L clitoral nerve neurolysis and avance nerve grafting,  Clarix interposition, complex wound closure w/ tubularization of neophallus with Dr. Mario Montes on 5/10/23.   S/p laparoscopic R oophorectomy on 12/10/22 for ovarian torsion    FHx:  Family History   Problem Relation Name Age of Onset    Diabetes Mother      Heart failure Mother      Hypertension Mother      Schizophrenia Mother      COPD Father          Social Hx:  Lillian Manriquez \"Veena\"    reports that Veena has never smoked. Veena has never used smokeless tobacco.  Veena  reports no history of alcohol use.  Veena  reports no history of drug use.  Social History     Socioeconomic History    Marital status: Single     Spouse name: Not on file    Number of children: Not on file    Years of education: Not on file    Highest education level: Not on file   Occupational History    Not on file   Tobacco Use    Smoking status: Never    Smokeless tobacco: Never   Substance and Sexual Activity    Alcohol use: Never    Drug use: Never    Sexual activity: Not on file   Other Topics Concern    Not on file   Social History Narrative    Not on file "     Social Determinants of Health     Financial Resource Strain: Not on file   Food Insecurity: Not on file   Transportation Needs: Not on file   Physical Activity: Not on file   Stress: Not on file   Social Connections: Not on file   Intimate Partner Violence: Not on file   Housing Stability: Not on file     Medications and allergies reviewed in EMR.    ROS:  Review of Systems - Oncology   10 point review of systems negative except as state in HPI.    Vitals & Statistics:  Objective   BSA: There is no height or weight on file to calculate BSA.  LMP  (LMP Unknown)     Physical Exam:  Physical Exam  Vitals and nursing note reviewed.   Constitutional:       Appearance: Normal appearance. Veena is normal weight.   HENT:      Head: Normocephalic and atraumatic.      Right Ear: External ear normal.      Left Ear: External ear normal.      Nose: Nose normal.      Mouth/Throat:      Mouth: Mucous membranes are moist.      Pharynx: Oropharynx is clear.   Eyes:      Extraocular Movements: Extraocular movements intact.      Conjunctiva/sclera: Conjunctivae normal.      Pupils: Pupils are equal, round, and reactive to light.   Cardiovascular:      Rate and Rhythm: Normal rate and regular rhythm.      Pulses: Normal pulses.      Heart sounds: Normal heart sounds.   Pulmonary:      Effort: Pulmonary effort is normal.      Breath sounds: Normal breath sounds.   Abdominal:      General: Abdomen is flat. Bowel sounds are normal.      Palpations: Abdomen is soft.      Comments: No masses or organomegaly palpable during exam   Musculoskeletal:         General: Normal range of motion.      Cervical back: Normal range of motion.   Lymphadenopathy:      Comments: No lymphadenopathy palpable   Skin:     General: Skin is warm and dry.      Capillary Refill: Capillary refill takes less than 2 seconds.   Neurological:      Mental Status: Veena is alert and oriented to person, place, and time. Mental status is at baseline.   Psychiatric:     "     Mood and Affect: Mood normal.         Behavior: Behavior normal.         Thought Content: Thought content normal.         Judgment: Judgment normal.     Results:  Lab Results   Component Value Date    WBC 4.9 12/14/2023    NEUTROABS 2.33 12/14/2023    IGABSOL 0.01 12/14/2023    LYMPHSABS 2.05 12/14/2023    MONOSABS 0.40 12/14/2023    EOSABS 0.05 12/14/2023    BASOSABS 0.03 12/14/2023    RBC 3.00 (L) 12/14/2023     (H) 12/14/2023    MCHC 35.2 12/14/2023    HGB 11.5 (L) 12/14/2023    HCT 32.7 (L) 12/14/2023     12/14/2023     Lab Results   Component Value Date    RETICCTPCT 3.3 (H) 07/09/2021      Lab Results   Component Value Date    CREATININE 1.04 11/30/2023    BUN 10 11/30/2023    EGFR 70 11/30/2023     11/30/2023    K 3.9 11/30/2023     11/30/2023    CO2 27 11/30/2023      Lab Results   Component Value Date    ALT 50 11/30/2023    AST 25 11/30/2023    ALKPHOS 92 11/30/2023    BILITOT 0.7 11/30/2023      Lab Results   Component Value Date    TSH 1.51 05/15/2024     Lab Results   Component Value Date    TSH 1.51 05/15/2024     Lab Results   Component Value Date    IRON 126 11/30/2023    TIBC 295 11/30/2023    FERRITIN 446 (H) 11/30/2023      No results found for: \"LVXFUYQO91\"   No results found for: \"FOLATE\"  Lab Results   Component Value Date    SEDRATE 14 01/09/2021      Lab Results   Component Value Date    CRP 0.51 01/09/2021      No results found for: \"GINI\"  Lab Results   Component Value Date     11/09/2017     No results found for: \"HAPTOGLOBIN\"  No results found for: \"SPEP\"  No results found for: \"IGG\", \"IGM\", \"IGA\"  No results found for: \"HEPATOT\", \"HEPAIGM\", \"HEPBCIGM\", \"HEPBCAB\", \"HEPBSAG\", \"HEPCAB\"  No results found for: \"HIV1X2\"    Assessment:  Lillian Manriquez is a 38 y.o. adult following up today for JAK2+ ET    Today she c/o chest tightness associated w/ episodes of increased anxiety which is currently treated by psychology and therapist.  No s/s of CV distress " during today's visit. Patient denies c/o chest tightness during today's visit    I reviewed patient's chart including but not limited to labs, imaging, surgical/procedure notes, pathology, hospital notes, doctor's notes.    5/30/24 results:  WBC count & differential WNL  Macrocytic, hypochromic erythrocytopenia at 3.45 - Hb WNL - Hct low at 35.6% - RDW low - Likely 2/2 Hydrea therapy  Thrombocytosis at 509K    Plan:  JAK2+ ET  Lab results pending - Will review when available and address adverse results as needed  Continue Hydrea 500mg 2 capsules PO QD - Refills submitted  RTC in 3 months via in-person visit - F/U sooner if needed/urgent  CBC-D, CMP, Iron studies, B12, Folate up to 1 week    I had an extensive discussion with the patient regarding the diagnosis and discussed the plan of therapy, including general considerations regarding side effects and outcomes. Pt understood and gave appropriate teach back about the plan of care. All questions were answered to the patient's satisfaction. The patient is instructed to contact us at any time if questions or problems arise. Thank you for the opportunity to participate in the care of this very pleasant patient.    Total time = 30 minutes. 50% or more of this time was spent in counseling and/or coordination of care including reviewing medical history/radiology/labs, examining patient, formulating outlined plan with team, and discussing plan with patient/family.    Laron Calvin PA-C

## 2024-05-31 ENCOUNTER — SOCIAL WORK (OUTPATIENT)
Dept: HEMATOLOGY/ONCOLOGY | Facility: HOSPITAL | Age: 39
End: 2024-05-31
Payer: COMMERCIAL

## 2024-05-31 NOTE — PROGRESS NOTES
LISW received referral from SUKHDEEP Oseguera RN regarding pt's concerns related to employment and finances. LISW contacted pt via telephone to discuss. Pt utilized time with LISW to discuss the challenges she has experienced at work and how it is impacting her mental health. Pt shares they follow with a psychiatrist and therapist; however, the therapist missed the appointment this week. LISW and pt discussed a plan to contact their therapist in the next hour. Additionally, Pt verbalized they can contact mobile [employer] or 211 for additional help. Patient denies any further psychosocial or support service needs at this time. Patient encouraged to contact LISW if any needs arise.      LISW will follow up with patient next week

## 2024-06-01 LAB — MIS SERPL-MCNC: 1.06 NG/ML (ref 0.18–11.71)

## 2024-06-11 ENCOUNTER — APPOINTMENT (OUTPATIENT)
Dept: HEMATOLOGY/ONCOLOGY | Facility: HOSPITAL | Age: 39
End: 2024-06-11
Payer: COMMERCIAL

## 2024-06-12 ENCOUNTER — APPOINTMENT (OUTPATIENT)
Dept: PRIMARY CARE | Facility: CLINIC | Age: 39
End: 2024-06-12
Payer: COMMERCIAL

## 2024-06-12 VITALS
SYSTOLIC BLOOD PRESSURE: 137 MMHG | DIASTOLIC BLOOD PRESSURE: 84 MMHG | BODY MASS INDEX: 27.48 KG/M2 | HEIGHT: 66 IN | HEART RATE: 81 BPM | WEIGHT: 171 LBS | OXYGEN SATURATION: 100 % | TEMPERATURE: 97.3 F

## 2024-06-12 DIAGNOSIS — F32.A DEPRESSION, UNSPECIFIED DEPRESSION TYPE: ICD-10-CM

## 2024-06-12 DIAGNOSIS — F43.9 TRAUMA AND STRESSOR-RELATED DISORDER: ICD-10-CM

## 2024-06-12 DIAGNOSIS — F31.9 BIPOLAR 1 DISORDER (MULTI): ICD-10-CM

## 2024-06-12 DIAGNOSIS — M79.2 NEUROPATHIC PAIN SYNDROME (NON-HERPETIC): ICD-10-CM

## 2024-06-12 DIAGNOSIS — R52 ACUTE PAIN: ICD-10-CM

## 2024-06-12 DIAGNOSIS — M54.12 CERVICAL RADICULOPATHY: Primary | ICD-10-CM

## 2024-06-12 RX ORDER — ENOXAPARIN SODIUM 100 MG/ML
INJECTION SUBCUTANEOUS
COMMUNITY
Start: 2024-01-09

## 2024-06-12 RX ORDER — TOPIRAMATE 50 MG/1
100 TABLET, FILM COATED ORAL NIGHTLY
Qty: 30 TABLET | Refills: 2 | Status: SHIPPED | OUTPATIENT
Start: 2024-06-12 | End: 2024-09-10

## 2024-06-12 RX ORDER — LIDOCAINE 50 MG/G
1 PATCH TOPICAL DAILY
Qty: 30 PATCH | Refills: 1 | Status: SHIPPED | OUTPATIENT
Start: 2024-06-12 | End: 2025-06-12

## 2024-06-12 RX ORDER — AMOXICILLIN AND CLAVULANATE POTASSIUM 875; 125 MG/1; MG/1
1 TABLET, FILM COATED ORAL
COMMUNITY
Start: 2023-07-24

## 2024-06-12 RX ORDER — METHOCARBAMOL 750 MG/1
TABLET, FILM COATED ORAL
COMMUNITY
Start: 2024-06-04

## 2024-06-12 RX ORDER — CETIRIZINE HYDROCHLORIDE 10 MG/1
1 TABLET ORAL
COMMUNITY
Start: 2023-10-25

## 2024-06-12 ASSESSMENT — PATIENT HEALTH QUESTIONNAIRE - PHQ9
1. LITTLE INTEREST OR PLEASURE IN DOING THINGS: MORE THAN HALF THE DAYS
9. THOUGHTS THAT YOU WOULD BE BETTER OFF DEAD, OR OF HURTING YOURSELF: SEVERAL DAYS
6. FEELING BAD ABOUT YOURSELF - OR THAT YOU ARE A FAILURE OR HAVE LET YOURSELF OR YOUR FAMILY DOWN: MORE THAN HALF THE DAYS
8. MOVING OR SPEAKING SO SLOWLY THAT OTHER PEOPLE COULD HAVE NOTICED. OR THE OPPOSITE, BEING SO FIGETY OR RESTLESS THAT YOU HAVE BEEN MOVING AROUND A LOT MORE THAN USUAL: MORE THAN HALF THE DAYS
4. FEELING TIRED OR HAVING LITTLE ENERGY: MORE THAN HALF THE DAYS
7. TROUBLE CONCENTRATING ON THINGS, SUCH AS READING THE NEWSPAPER OR WATCHING TELEVISION: MORE THAN HALF THE DAYS
2. FEELING DOWN, DEPRESSED OR HOPELESS: MORE THAN HALF THE DAYS
SUM OF ALL RESPONSES TO PHQ QUESTIONS 1-9: 17
3. TROUBLE FALLING OR STAYING ASLEEP OR SLEEPING TOO MUCH: MORE THAN HALF THE DAYS
10. IF YOU CHECKED OFF ANY PROBLEMS, HOW DIFFICULT HAVE THESE PROBLEMS MADE IT FOR YOU TO DO YOUR WORK, TAKE CARE OF THINGS AT HOME, OR GET ALONG WITH OTHER PEOPLE: EXTREMELY DIFFICULT
SUM OF ALL RESPONSES TO PHQ9 QUESTIONS 1 AND 2: 4
5. POOR APPETITE OR OVEREATING: MORE THAN HALF THE DAYS

## 2024-06-12 ASSESSMENT — COLUMBIA-SUICIDE SEVERITY RATING SCALE - C-SSRS
6. HAVE YOU EVER DONE ANYTHING, STARTED TO DO ANYTHING, OR PREPARED TO DO ANYTHING TO END YOUR LIFE?: YES
4. HAVE YOU HAD THESE THOUGHTS AND HAD SOME INTENTION OF ACTING ON THEM?: YES
5. HAVE YOU STARTED TO WORK OUT OR WORKED OUT THE DETAILS OF HOW TO KILL YOURSELF? DO YOU INTEND TO CARRY OUT THIS PLAN?: YES
2. HAVE YOU ACTUALLY HAD ANY THOUGHTS OF KILLING YOURSELF?: YES
6. HAVE YOU EVER DONE ANYTHING, STARTED TO DO ANYTHING, OR PREPARED TO DO ANYTHING TO END YOUR LIFE?: YES
1. IN THE PAST MONTH, HAVE YOU WISHED YOU WERE DEAD OR WISHED YOU COULD GO TO SLEEP AND NOT WAKE UP?: YES

## 2024-06-12 ASSESSMENT — ENCOUNTER SYMPTOMS
DEPRESSION: 1
OCCASIONAL FEELINGS OF UNSTEADINESS: 0
LOSS OF SENSATION IN FEET: 0

## 2024-06-12 ASSESSMENT — PAIN SCALES - GENERAL: PAINLEVEL: 7

## 2024-06-12 NOTE — PATIENT INSTRUCTIONS
Gabapentin taper: take 300mg every other day for 1 week. Take one 300mg tablet every 2 days the next week. Stop the third week.    [facility] OFFICE OF  information:   [place] Location:  [place] [address]    Mailing Address:  [address] [place] [id number]    Phone: [phone]    Email: [email or web address]

## 2024-06-12 NOTE — PROGRESS NOTES
Precepting Note  Lillian Curiel Mitra  [id number]    Patient was seen in [facility] residency clinic today as part of a multidisciplinary teaching team. I participated in this patient's care as a lilian precepting physician.    Agreeable with plan as discussed with resident Dorcas Montes DO and attending provider, Dr. Worley.      Kathia Dunne MD  Family Medicine, PGY-3
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
## 2024-06-13 ENCOUNTER — OFFICE VISIT (OUTPATIENT)
Dept: PAIN MEDICINE | Facility: CLINIC | Age: 39
End: 2024-06-13
Payer: COMMERCIAL

## 2024-06-13 VITALS
HEART RATE: 78 BPM | DIASTOLIC BLOOD PRESSURE: 79 MMHG | TEMPERATURE: 98.4 F | SYSTOLIC BLOOD PRESSURE: 144 MMHG | WEIGHT: 170 LBS | RESPIRATION RATE: 18 BRPM | HEIGHT: 66 IN | OXYGEN SATURATION: 100 % | BODY MASS INDEX: 27.32 KG/M2

## 2024-06-13 DIAGNOSIS — G62.9 NEUROPATHY: ICD-10-CM

## 2024-06-13 DIAGNOSIS — G89.29 CHRONIC GENERALIZED PAIN: ICD-10-CM

## 2024-06-13 DIAGNOSIS — M79.2 NEUROPATHIC PAIN SYNDROME (NON-HERPETIC): ICD-10-CM

## 2024-06-13 DIAGNOSIS — F41.0 PANIC ATTACKS: ICD-10-CM

## 2024-06-13 DIAGNOSIS — R52 CHRONIC GENERALIZED PAIN: ICD-10-CM

## 2024-06-13 DIAGNOSIS — Z79.891 LONG TERM (CURRENT) USE OF OPIATE ANALGESIC: Primary | ICD-10-CM

## 2024-06-13 DIAGNOSIS — F31.9 BIPOLAR 1 DISORDER (MULTI): ICD-10-CM

## 2024-06-13 LAB
AMPHETAMINES UR QL SCN: NORMAL
AMPHETAMINES UR QL SCN: NORMAL
BARBITURATES UR QL SCN: NORMAL
BARBITURATES UR QL SCN: NORMAL
BENZODIAZ UR QL SCN: NORMAL
BZE UR QL SCN: NORMAL
BZE UR QL SCN: NORMAL
CANNABINOIDS UR QL SCN: NORMAL
CANNABINOIDS UR QL SCN: NORMAL
CREAT UR-MCNC: 197.8 MG/DL (ref 20–370)
FENTANYL+NORFENTANYL UR QL SCN: NORMAL
METHADONE UR QL SCN: NORMAL
OPIATES UR QL SCN: NORMAL
OXYCODONE+OXYMORPHONE UR QL SCN: NORMAL
PCP UR QL SCN: NORMAL
PCP UR QL SCN: NORMAL

## 2024-06-13 PROCEDURE — 99204 OFFICE O/P NEW MOD 45 MIN: CPT | Performed by: ANESTHESIOLOGY

## 2024-06-13 PROCEDURE — 80307 DRUG TEST PRSMV CHEM ANLYZR: CPT | Mod: PARLAB | Performed by: ANESTHESIOLOGY

## 2024-06-13 PROCEDURE — 80307 DRUG TEST PRSMV CHEM ANLYZR: CPT | Performed by: ANESTHESIOLOGY

## 2024-06-13 PROCEDURE — 99214 OFFICE O/P EST MOD 30 MIN: CPT | Performed by: ANESTHESIOLOGY

## 2024-06-13 PROCEDURE — 1036F TOBACCO NON-USER: CPT | Performed by: ANESTHESIOLOGY

## 2024-06-13 RX ORDER — PREGABALIN 75 MG/1
75 CAPSULE ORAL 2 TIMES DAILY
Qty: 60 CAPSULE | Refills: 1 | Status: SHIPPED | OUTPATIENT
Start: 2024-06-20 | End: 2024-07-20

## 2024-06-13 ASSESSMENT — ENCOUNTER SYMPTOMS
DEPRESSION: 0
LOSS OF SENSATION IN FEET: 1
OCCASIONAL FEELINGS OF UNSTEADINESS: 1

## 2024-06-13 ASSESSMENT — PAIN SCALES - GENERAL: PAINLEVEL: 6

## 2024-06-13 NOTE — PROGRESS NOTES
Pain #6/10 to her neck, left shoulder starting 2 weeks ago without any specific event.  Occasional numbness/tingling to her hands and feet, especially with walking too much or standing.  Started Gabapentin for a few years and takes 300mg at bedtime.  States it makes her sleepy if she takes it during the day.
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
## 2024-06-19 ENCOUNTER — CLINICAL SUPPORT (OUTPATIENT)
Dept: ALLERGY | Facility: HOSPITAL | Age: 39
End: 2024-06-19
Payer: COMMERCIAL

## 2024-06-19 VITALS
HEART RATE: 91 BPM | OXYGEN SATURATION: 100 % | RESPIRATION RATE: 18 BRPM | SYSTOLIC BLOOD PRESSURE: 118 MMHG | DIASTOLIC BLOOD PRESSURE: 79 MMHG | TEMPERATURE: 99 F

## 2024-06-19 DIAGNOSIS — J30.9 ALLERGIC RHINITIS, UNSPECIFIED SEASONALITY, UNSPECIFIED TRIGGER: ICD-10-CM

## 2024-06-19 PROCEDURE — INJT2 ALLERGY IMMUNOTHERAPY 2+ INJECTIONS: Performed by: ALLERGY & IMMUNOLOGY

## 2024-06-19 PROCEDURE — 95117 IMMUNOTHERAPY INJECTIONS: CPT | Performed by: ALLERGY & IMMUNOLOGY

## 2024-06-19 ASSESSMENT — ENCOUNTER SYMPTOMS
OCCASIONAL FEELINGS OF UNSTEADINESS: 0
LOSS OF SENSATION IN FEET: 0
DEPRESSION: 0

## 2024-07-01 NOTE — PROGRESS NOTES
I saw and evaluated the patient. I personally obtained the key and critical portions of the history and physical exam or was physically present for key and critical portions performed by the resident/fellow. I reviewed the resident/fellow's documentation and discussed the patient with the resident/fellow. I agree with the resident/fellow's medical decision making as documented in the note with the exception/addition of the following:    From the patient does have a follow-up with Dr. Montes on 7/15/2024.  If the dysfunctional job situation persists it might be helpful to connect the patient with our navigator to review community resources that might be helpful to her.     Glenna Worley MD

## 2024-07-15 ENCOUNTER — APPOINTMENT (OUTPATIENT)
Dept: PRIMARY CARE | Facility: CLINIC | Age: 39
End: 2024-07-15
Payer: COMMERCIAL

## 2024-07-17 ENCOUNTER — CLINICAL SUPPORT (OUTPATIENT)
Dept: ALLERGY | Facility: HOSPITAL | Age: 39
End: 2024-07-17
Payer: COMMERCIAL

## 2024-07-17 VITALS — SYSTOLIC BLOOD PRESSURE: 124 MMHG | TEMPERATURE: 98.7 F | DIASTOLIC BLOOD PRESSURE: 82 MMHG | HEART RATE: 83 BPM

## 2024-07-17 DIAGNOSIS — J30.89 ALLERGIC RHINITIS DUE TO MOLD: ICD-10-CM

## 2024-07-17 DIAGNOSIS — J30.81 ALLERGIC RHINITIS DUE TO ANIMAL DANDER: ICD-10-CM

## 2024-07-17 PROCEDURE — INJT2 ALLERGY IMMUNOTHERAPY 2+ INJECTIONS: Performed by: ALLERGY & IMMUNOLOGY

## 2024-07-17 PROCEDURE — 95117 IMMUNOTHERAPY INJECTIONS: CPT | Performed by: ALLERGY & IMMUNOLOGY

## 2024-07-24 ENCOUNTER — APPOINTMENT (OUTPATIENT)
Dept: BEHAVIORAL HEALTH | Facility: CLINIC | Age: 39
End: 2024-07-24
Payer: COMMERCIAL

## 2024-07-24 DIAGNOSIS — F43.9 TRAUMA AND STRESSOR-RELATED DISORDER: ICD-10-CM

## 2024-07-24 DIAGNOSIS — F31.9 BIPOLAR 1 DISORDER (MULTI): ICD-10-CM

## 2024-07-24 DIAGNOSIS — F41.9 ANXIETY: ICD-10-CM

## 2024-07-24 PROCEDURE — 99214 OFFICE O/P EST MOD 30 MIN: CPT | Performed by: PSYCHIATRY & NEUROLOGY

## 2024-07-24 PROCEDURE — 1036F TOBACCO NON-USER: CPT | Performed by: PSYCHIATRY & NEUROLOGY

## 2024-07-24 RX ORDER — PRAZOSIN HYDROCHLORIDE 1 MG/1
1-3 CAPSULE ORAL NIGHTLY
Qty: 90 CAPSULE | Refills: 2 | Status: SHIPPED | OUTPATIENT
Start: 2024-07-24 | End: 2024-10-22

## 2024-07-24 RX ORDER — BUSPIRONE HYDROCHLORIDE 5 MG/1
5 TABLET ORAL 2 TIMES DAILY
Qty: 60 TABLET | Refills: 2 | Status: SHIPPED | OUTPATIENT
Start: 2024-07-24 | End: 2024-10-22

## 2024-07-24 RX ORDER — BUPROPION HYDROCHLORIDE 150 MG/1
150 TABLET, EXTENDED RELEASE ORAL DAILY
Qty: 30 TABLET | Refills: 2 | Status: SHIPPED | OUTPATIENT
Start: 2024-07-24 | End: 2024-10-22

## 2024-07-24 RX ORDER — LAMOTRIGINE 100 MG/1
100 TABLET ORAL DAILY
Qty: 30 TABLET | Refills: 2 | Status: SHIPPED | OUTPATIENT
Start: 2024-07-24 | End: 2024-10-22

## 2024-07-24 ASSESSMENT — ENCOUNTER SYMPTOMS
DYSPHORIC MOOD: 1
SLEEP DISTURBANCE: 1
NERVOUS/ANXIOUS: 0

## 2024-07-24 NOTE — PROGRESS NOTES
"Adult Ambulatory Psychiatry Progress Note    Virtual or Telephone Consent    An interactive audio and video telecommunication system which permits real time communications between the patient (at the originating site) and provider (at the distant site) was utilized to provide this telehealth service.   Verbal consent was requested and obtained from Lillian Manriquez on this date, 24 for a telehealth visit.      Assessment/Plan     Impression:  Lillian Manriquez \"Shakeel" is a 38 y.o. 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       Plan:      Bipolar d/o - lamotrigine 100mg daily, bupropion SR 150mg daily  Trauma/anxiety - buspar 5mg BID, hydroxyzine 10mg TID prn panic, prazosin 1-3mg at bedtime, c/w med ed, psycho ed, supportive psychotherapy to build therapeutic rapport, f/u 2 months    Subjective     Chief Complaint: Bipolar, PTSD (Post-Traumatic Stress Disorder), and Anxiety    HPI:  Pt arrived on time. They changes from gabapentin to pregablin for chronic pain. It's making them tired, forgetful and having some twitching. They've talked to their pain mgt provider about it. They notice anxiety is gone. They left the IT job. They went to the ED for chest and arm pain. Per documentation they verbalized wanting to be with their  mother but denied SI before discharge. They're upset with treatment there.           Review of Systems   Psychiatric/Behavioral:  Positive for dysphoric mood and sleep disturbance. Negative for suicidal ideas. The patient is not nervous/anxious.          Objective   Mental Status Exam:       Vitals:  There were no vitals filed for this visit.    Current Medications:  Current Outpatient Medications on File Prior to Visit   Medication Sig Dispense Refill    acetaminophen (Tylenol) 500 mg tablet Take 1-2 tablets (500-1,000 mg) by mouth every 6 hours if needed for mild pain " (1 - 3).      albuterol 90 mcg/actuation inhaler Inhale 2 puffs see administration instructions. EVERY 4-6 HOURS AS NEEDED.      amoxicillin-pot clavulanate (Augmentin) 875-125 mg tablet Take 1 tablet by mouth every 12 hours.      aspirin 81 mg EC tablet Take 1 tablet (81 mg) by mouth 1 time.      cetirizine (ZyrTEC) 10 mg tablet Take 1 tablet (10 mg) by mouth once daily.      cholecalciferol (Vitamin D-3) 25 MCG (1000 UT) tablet Take 2 tablets (2,000 Units) by mouth once daily.      clobetasol (Temovate) 0.05 % ointment Apply 1 Application topically if needed. APPLY TO MOSQUITO BITES AS NEEDED      docusate sodium (Colace) 100 mg capsule Take 1 capsule (100 mg) by mouth twice a day.      dupilumab (Dupixent) 300 mg/2 mL pen injector Inject 2 mL (300 mg) under the skin every 14 (fourteen) days.      enoxaparin (Lovenox) 40 mg/0.4 mL syringe       fexofenadine (Allegra) 180 mg tablet Take 1 tablet (180 mg) by mouth once daily.      fluticasone (Flonase) 50 mcg/actuation nasal spray 1 spray by Does not apply route once daily.      hydrocortisone 2.5 % cream Apply topically once daily. 30 g 5    hydroxyurea (Hydrea) 500 mg capsule Take 2 capsules (1,000 mg total) by mouth once daily.  Take at the same time each day. 180 capsule 1    hydrOXYzine HCL (Atarax) 10 mg tablet Take 1 tablet (10 mg) by mouth 3 times a day as needed for anxiety. 90 tablet 0    inhaler, assist devices (E-Z SPACER MISC) 1 Units see administration instructions. USE WITH INHALER AS DIRECTED      levocetirizine (Xyzal) 5 mg tablet Take 1 tablet (5 mg) by mouth once daily. 30 tablet 11    lidocaine (Lidoderm) 5 % patch Place 1 patch over 12 hours on the skin once daily. Apply to painful area 12 hours per day, remove for 12 hours. 30 patch 1    methocarbamol (Robaxin) 750 mg tablet TAKE 1 TABLET BY MOUTH EVERY 6 HOURS AS NEEDED FOR 3 DAYS      spironolactone (Aldactone) 50 mg tablet Take 1 tablet (50 mg) by mouth once daily. 30 tablet 2    topiramate  (Topamax) 50 mg tablet Take 2 tablets (100 mg) by mouth once daily at bedtime. 30 tablet 2    triamcinolone (Kenalog) 0.1 % ointment Apply 1 Application topically if needed (APPLY ONE APPLICATION TOPICALLY ON ARMS/LEGS DAILY AS NEEDED FOR DRY AREAS AND FOR DARK BUMPS).      [DISCONTINUED] buPROPion SR (Wellbutrin SR) 150 mg 12 hr tablet Take 1 tablet (150 mg) by mouth once daily. Do not crush, chew, or split. 30 tablet 2    [DISCONTINUED] busPIRone (Buspar) 5 mg tablet Take 1 tablet (5 mg) by mouth 2 times a day. 60 tablet 1    [DISCONTINUED] gabapentin (Neurontin) 300 mg capsule Take 1 capsule (300 mg) by mouth once daily. 30 capsule 2    [DISCONTINUED] gabapentin (Neurontin) 300 mg capsule Take 1 capsule (300 mg) by mouth once daily. 30 capsule 11    [DISCONTINUED] lamoTRIgine (LaMICtal) 100 mg tablet Take 1 tablet (100 mg) by mouth once daily. 30 tablet 2    [DISCONTINUED] prazosin (Minipress) 1 mg capsule Take 1-3 capsules (1-3 mg) by mouth once daily at bedtime. 90 capsule 2    [DISCONTINUED] pregabalin (Lyrica) 75 mg capsule Take 1 capsule (75 mg) by mouth 2 times a day. Do not fill before June 20, 2024. 60 capsule 1     Current Facility-Administered Medications on File Prior to Visit   Medication Dose Route Frequency Provider Last Rate Last Admin    ALLERGEN REFILL VIAL 1 & 2   subcutaneous PRN Princess TANIA Morales MD           Lab Review:   Office Visit on 06/13/2024   Component Date Value    Creatinine, Urine Random 06/13/2024 197.8     Amphetamine Screen, Urine 06/13/2024 Presumptive Negative     Barbiturate Screen, Urine 06/13/2024 Presumptive Negative     Cannabinoid Screen, Urine 06/13/2024 Presumptive Negative     Cocaine Metabolite Scree* 06/13/2024 Presumptive Negative     PCP Screen, Urine 06/13/2024 Presumptive Negative     Clonazepam 06/13/2024 <25     7-Aminoclonazepam 06/13/2024 <25     Alprazolam 06/13/2024 <25     Alpha-Hydroxyalprazolam 06/13/2024 <25     Midazolam 06/13/2024 <25      Alpha-Hydroxymidazolam 06/13/2024 <25     Chlordiazepoxide 06/13/2024 <25     Diazepam 06/13/2024 <25     Nordiazepam 06/13/2024 <25     Temazepam 06/13/2024 <25     Oxazepam 06/13/2024 <25     Lorazepam 06/13/2024 <25     Methadone 06/13/2024 <25     EDDP 06/13/2024 <25     6-Acetylmorphine 06/13/2024 <25     Codeine 06/13/2024 <50     Hydrocodone 06/13/2024 <25     Hydromorphone 06/13/2024 <25     Morphine  06/13/2024 <50     Norhydrocodone 06/13/2024 <25     Noroxycodone 06/13/2024 <25     Oxycodone 06/13/2024 <25     Oxymorphone 06/13/2024 <25     Fentanyl 06/13/2024 <2.5     Norfentanyl 06/13/2024 <2.5     Tramadol 06/13/2024 <50     O-Desmethyltramadol 06/13/2024 <50     Zolpidem 06/13/2024 <25     Zolpidem Metabolite (ZCA) 06/13/2024 <25     Amphetamine Screen, Urine 06/13/2024 Presumptive Negative     Barbiturate Screen, Urine 06/13/2024 Presumptive Negative     Benzodiazepines Screen, * 06/13/2024 Presumptive Negative     Cannabinoid Screen, Urine 06/13/2024 Presumptive Negative     Cocaine Metabolite Scree* 06/13/2024 Presumptive Negative     Fentanyl Screen, Urine 06/13/2024 Presumptive Negative     Opiate Screen, Urine 06/13/2024 Presumptive Negative     Oxycodone Screen, Urine 06/13/2024 Presumptive Negative     PCP Screen, Urine 06/13/2024 Presumptive Negative     Methadone Screen, Urine 06/13/2024 Presumptive Negative    Lab on 05/30/2024   Component Date Value    Anti-Mullerian Hormone 05/30/2024 1.060     Follicle Stimulating Hor* 05/30/2024 2.2     Luteinizing Hormone 05/30/2024 1.9     Estradiol 05/30/2024 182     Progesterone 05/30/2024 6.5     Extra Tube 05/30/2024 Hold for add-ons.     Extra Tube 05/30/2024 Hold for add-ons.     WBC 05/30/2024 5.7     nRBC 05/30/2024 0.0     RBC 05/30/2024 3.45 (L)     Hemoglobin 05/30/2024 12.8     Hematocrit 05/30/2024 35.6 (L)     MCV 05/30/2024 103 (H)     MCH 05/30/2024 37.1 (H)     MCHC 05/30/2024 36.0     RDW 05/30/2024 11.4 (L)     Platelets 05/30/2024  509 (H)     Neutrophils % 05/30/2024 41.4     Immature Granulocytes %,* 05/30/2024 0.3     Lymphocytes % 05/30/2024 51.4     Monocytes % 05/30/2024 5.9     Eosinophils % 05/30/2024 0.7     Basophils % 05/30/2024 0.3     Neutrophils Absolute 05/30/2024 2.37     Immature Granulocytes Ab* 05/30/2024 0.02     Lymphocytes Absolute 05/30/2024 2.95     Monocytes Absolute 05/30/2024 0.34     Eosinophils Absolute 05/30/2024 0.04     Basophils Absolute 05/30/2024 0.02     Glucose 05/30/2024 92     Sodium 05/30/2024 138     Potassium 05/30/2024 3.9     Chloride 05/30/2024 104     Bicarbonate 05/30/2024 24     Anion Gap 05/30/2024 14     Urea Nitrogen 05/30/2024 11     Creatinine 05/30/2024 1.07     eGFR 05/30/2024 68     Calcium 05/30/2024 9.8     Albumin 05/30/2024 4.6     Alkaline Phosphatase 05/30/2024 69     Total Protein 05/30/2024 7.8     AST 05/30/2024 19     Bilirubin, Total 05/30/2024 0.6     ALT 05/30/2024 16        Orders:  Diagnoses and all orders for this visit:  Anxiety  -     busPIRone (Buspar) 5 mg tablet; Take 1 tablet (5 mg) by mouth 2 times a day.  Bipolar 1 disorder (Multi)  -     buPROPion SR (Wellbutrin SR) 150 mg 12 hr tablet; Take 1 tablet (150 mg) by mouth once daily. Do not crush, chew, or split.  -     lamoTRIgine (LaMICtal) 100 mg tablet; Take 1 tablet (100 mg) by mouth once daily.  -     Follow Up In Psychiatry; Future  Trauma and stressor-related disorder  -     prazosin (Minipress) 1 mg capsule; Take 1-3 capsules (1-3 mg) by mouth once daily at bedtime.    Risk Assessment:  Risk of harm to self: Medium Risk - Risk factors include: {Depression, History of impulsivity and/or aggressive behavior , History of trauma or abuse , Loss (relational, social, occupational, financial) , and Medical illness comorbidity , Protective Factors include: Denies current suicidal ideation, Denies history of suicide attempts , Future-oriented talk , Willingness to seek help and support , Age, Access to a variety of  clinical interventions , Receiving and engaged in care for mental, physical, and substance use disorders , Support through ongoing medical and mental healthcare relationships , and Current/history of good response to treatment/meds     Risk of harm to others: Low Risk - Risk factors include: No significant risk factors identified on screening. Protective factors include: Lack of known history of harm to others , Lack of known history of violent ideation , Lack of known access to firearms , Sense of community, availability/access to resources and support , Sense of optimism, hope , Interpersonal competence , Affect regulation , Sense of self-efficacy, internal locus of control , and Positive, pro-social family/peer network     Next Appointment:  Follow up in 2 months (on 9/27/2024).

## 2024-07-25 ENCOUNTER — OFFICE VISIT (OUTPATIENT)
Dept: PAIN MEDICINE | Facility: CLINIC | Age: 39
End: 2024-07-25
Payer: COMMERCIAL

## 2024-07-25 VITALS
SYSTOLIC BLOOD PRESSURE: 129 MMHG | BODY MASS INDEX: 27.16 KG/M2 | RESPIRATION RATE: 18 BRPM | TEMPERATURE: 98.6 F | WEIGHT: 169 LBS | OXYGEN SATURATION: 100 % | HEART RATE: 75 BPM | HEIGHT: 66 IN | DIASTOLIC BLOOD PRESSURE: 60 MMHG

## 2024-07-25 DIAGNOSIS — M79.2 NEUROPATHIC PAIN SYNDROME (NON-HERPETIC): ICD-10-CM

## 2024-07-25 PROCEDURE — 99212 OFFICE O/P EST SF 10 MIN: CPT | Performed by: ANESTHESIOLOGY

## 2024-07-25 PROCEDURE — 3008F BODY MASS INDEX DOCD: CPT | Performed by: ANESTHESIOLOGY

## 2024-07-25 RX ORDER — PREGABALIN 75 MG/1
75 CAPSULE ORAL 2 TIMES DAILY
Qty: 60 CAPSULE | Refills: 1 | Status: SHIPPED | OUTPATIENT
Start: 2024-07-25 | End: 2024-08-24

## 2024-07-25 ASSESSMENT — COLUMBIA-SUICIDE SEVERITY RATING SCALE - C-SSRS
6. HAVE YOU EVER DONE ANYTHING, STARTED TO DO ANYTHING, OR PREPARED TO DO ANYTHING TO END YOUR LIFE?: NO
2. HAVE YOU ACTUALLY HAD ANY THOUGHTS OF KILLING YOURSELF?: NO
1. IN THE PAST MONTH, HAVE YOU WISHED YOU WERE DEAD OR WISHED YOU COULD GO TO SLEEP AND NOT WAKE UP?: NO

## 2024-07-25 ASSESSMENT — ENCOUNTER SYMPTOMS
LOSS OF SENSATION IN FEET: 0
DEPRESSION: 0
OCCASIONAL FEELINGS OF UNSTEADINESS: 0

## 2024-07-25 NOTE — PROGRESS NOTES
"Subjective   Patient ID: Lillian Manriquez \"Shakeel" is a 38 y.o. adult who presents for neck and left arm Pain.  HPI  Patient suffers from bipolar disorder and paranoia.  She also has a history of previous suicidal ideation.  She was assigned female at birth and is transgender nonbinary who prefers they them in their.  CAT scan was done on Syeda 3 24th with was normal.  I placed the patient on Lyrica 75 mg twice daily which is taken 95% of her pain away.  Patient presents today is pleasant cooperative and big smile on her face and stating that her pain is nearly gone.  Her only complaint at this point in time is that she has increased somnolence in the daytime.  Review of Systems  Unremarkable except chief complaint of left neck and shoulder pain which has resolved  Objective   Physical Exam  Gen. appearance:  Patient's alert and oriented ×3 ;cooperative mild to moderate distress  Heart: Regular rate and rhythm  Lungs: Clear to auscultation  Abdomen: Soft nontender  Neuro: Cranial nerves II -XII intact; DTR: +2 over 4 biceps triceps brachial radialis patellar and Achilles  Spinal exam: Positive paraspinal tenderness noted bilaterally L4 5 L5-S1 with flexion extension and rotation/no bony abnormalities  Musculoskeletal:  Upper and lower extremity strength was 4/5 bilaterally  :  deferred  Skin: Warm and dry      Assessment/Plan   Diagnoses and all orders for this visit:  Neuropathic pain syndrome (non-herpetic)  Patient was told to skip the morning dose every other day here for a week to see if the somnolence dissipates.  She was also advised to call if she had any further issues and was welcome to come back for a follow-up visit as necessary.       Faizan Ling DO 07/25/24 11:58 AM   "

## 2024-07-25 NOTE — PROGRESS NOTES
Patient no longer having neck and arm pain or throbbing of hands and feet since starting on Lyrica after last visit.  Taking it BID.  States it is making her sleepy but is working through it.  Has been walking more now, walking her dog.  Less anxiety.

## 2024-08-14 ENCOUNTER — OFFICE VISIT (OUTPATIENT)
Dept: PRIMARY CARE | Facility: CLINIC | Age: 39
End: 2024-08-14
Payer: COMMERCIAL

## 2024-08-14 VITALS
DIASTOLIC BLOOD PRESSURE: 79 MMHG | SYSTOLIC BLOOD PRESSURE: 127 MMHG | RESPIRATION RATE: 16 BRPM | TEMPERATURE: 97.3 F | BODY MASS INDEX: 27.16 KG/M2 | HEIGHT: 66 IN | WEIGHT: 169 LBS | OXYGEN SATURATION: 99 %

## 2024-08-14 DIAGNOSIS — E78.1 HYPERTRIGLYCERIDEMIA WITHOUT HYPERCHOLESTEROLEMIA: ICD-10-CM

## 2024-08-14 DIAGNOSIS — R74.8 ELEVATED VITAMIN B12 LEVEL: ICD-10-CM

## 2024-08-14 DIAGNOSIS — R79.89 ELEVATED LFTS: Primary | ICD-10-CM

## 2024-08-14 DIAGNOSIS — L29.9 CHRONIC PRURITUS: ICD-10-CM

## 2024-08-14 LAB
ALBUMIN SERPL BCP-MCNC: 4.5 G/DL (ref 3.4–5)
ALP SERPL-CCNC: 72 U/L (ref 33–120)
ALT SERPL W P-5'-P-CCNC: 14 U/L (ref 7–52)
ANION GAP SERPL CALC-SCNC: 14 MMOL/L (ref 10–20)
AST SERPL W P-5'-P-CCNC: 14 U/L (ref 9–39)
BILIRUB SERPL-MCNC: 0.4 MG/DL (ref 0–1.2)
BUN SERPL-MCNC: 11 MG/DL (ref 6–23)
CALCIUM SERPL-MCNC: 10.1 MG/DL (ref 8.6–10.6)
CHLORIDE SERPL-SCNC: 106 MMOL/L (ref 98–107)
CHOLEST SERPL-MCNC: 155 MG/DL (ref 0–199)
CHOLESTEROL/HDL RATIO: 5.3
CO2 SERPL-SCNC: 22 MMOL/L (ref 21–32)
CREAT SERPL-MCNC: 1.12 MG/DL (ref 0.5–1.3)
EGFRCR SERPLBLD CKD-EPI 2021: 64 ML/MIN/1.73M*2
ERYTHROCYTE [DISTWIDTH] IN BLOOD BY AUTOMATED COUNT: 12.9 % (ref 11.5–14.5)
GLUCOSE SERPL-MCNC: 87 MG/DL (ref 74–99)
HCT VFR BLD AUTO: 35.5 % (ref 36–52)
HDLC SERPL-MCNC: 29.2 MG/DL
HGB BLD-MCNC: 12 G/DL (ref 12–17.5)
LDLC SERPL CALC-MCNC: 72 MG/DL
MCH RBC QN AUTO: 37.5 PG (ref 26–34)
MCHC RBC AUTO-ENTMCNC: 33.8 G/DL (ref 32–36)
MCV RBC AUTO: 111 FL (ref 80–100)
NON HDL CHOLESTEROL: 126 MG/DL (ref 0–149)
NRBC BLD-RTO: 0 /100 WBCS (ref 0–0)
PLATELET # BLD AUTO: 446 X10*3/UL (ref 150–450)
POTASSIUM SERPL-SCNC: 4.2 MMOL/L (ref 3.5–5.3)
PROT SERPL-MCNC: 7.5 G/DL (ref 6.4–8.2)
RBC # BLD AUTO: 3.2 X10*6/UL (ref 4–5.9)
SODIUM SERPL-SCNC: 138 MMOL/L (ref 136–145)
TRIGL SERPL-MCNC: 269 MG/DL (ref 0–149)
TSH SERPL-ACNC: 1.11 MIU/L (ref 0.44–3.98)
VIT B12 SERPL-MCNC: 823 PG/ML (ref 211–911)
VLDL: 54 MG/DL (ref 0–40)
WBC # BLD AUTO: 4.6 X10*3/UL (ref 4.4–11.3)

## 2024-08-14 PROCEDURE — 36415 COLL VENOUS BLD VENIPUNCTURE: CPT | Performed by: FAMILY MEDICINE

## 2024-08-14 PROCEDURE — 80061 LIPID PANEL: CPT | Performed by: FAMILY MEDICINE

## 2024-08-14 PROCEDURE — 82607 VITAMIN B-12: CPT | Performed by: FAMILY MEDICINE

## 2024-08-14 PROCEDURE — 84075 ASSAY ALKALINE PHOSPHATASE: CPT | Performed by: FAMILY MEDICINE

## 2024-08-14 PROCEDURE — 84443 ASSAY THYROID STIM HORMONE: CPT | Performed by: FAMILY MEDICINE

## 2024-08-14 PROCEDURE — 3008F BODY MASS INDEX DOCD: CPT | Performed by: FAMILY MEDICINE

## 2024-08-14 PROCEDURE — 85027 COMPLETE CBC AUTOMATED: CPT | Performed by: FAMILY MEDICINE

## 2024-08-14 PROCEDURE — 99214 OFFICE O/P EST MOD 30 MIN: CPT | Performed by: FAMILY MEDICINE

## 2024-08-14 PROCEDURE — 1036F TOBACCO NON-USER: CPT | Performed by: FAMILY MEDICINE

## 2024-08-14 ASSESSMENT — PAIN SCALES - GENERAL: PAINLEVEL: 0-NO PAIN

## 2024-08-14 NOTE — PROGRESS NOTES
"Subjective   Lillian Manriquez \"Veena\" is a 38 y.o. adult who presents for follow-up    L arm weakness  - Had appointment for MRI, but pain and weakness has since resolved  - Lasted for 4 weeks  - Now completely better  - Didn't do PT, just rested  - Lyrica helped a little. Continues to take this for neuropathy. Follows with pain management     Worried about weight gain  - See last visit for further information  - Able to move around more now that neuropathy is better controlled  - Has lost about 6 lbs since last visit, happy with this change    Bipolar disorder, PTSD  - Following with therapist weekly  - Declines escalating care  - Having severe anxiety over hospitals and healthcare  - Has passive SI, no intent or plan. Has safety plan of calling crisis hotline, calling therapist, reaching out to friends       Comprehensive Medical and Social History  Patient Active Problem List   Diagnosis    Abnormal weight gain    Acute pain    Bipolar 1 disorder (Multi)    Gender dysphoria    Migraine with aura    Seasonal allergic rhinitis due to pollen    Trauma and stressor-related disorder    Torsion of ovary, ovarian pedicle, or fallopian tube    Essential thrombocythemia (Multi)    Hypertriglyceridemia without hypercholesterolemia    Hirsutism    SCC (squamous cell carcinoma)    Oral candida    Pain in joint, ankle and foot    Panic attacks    Paranoia (psychosis) (Multi)    Patent foramen ovale (HHS-HCC)    Wound infection    Depression    MVP (mitral valve prolapse)    Mild intermittent asthma without complication (HHS-HCC)    GERD (gastroesophageal reflux disease)    Flank pain    Dyslexia    Calculus of ureter    Auditory hallucinations    Anxiety    Acquired equinus foot deformity    Acne    Abnormality of gait    Insomnia    Allergic rhinitis due to mold    Allergic rhinitis due to animal dander    Neuropathic pain syndrome (non-herpetic)    Neuropathy     Past Medical History:   Diagnosis Date    Acute upper " "respiratory infection, unspecified 02/24/2020    Acute URI    Encounter for examination of ears and hearing without abnormal findings 10/12/2015    Examination of ears and hearing    Flat foot (pes planus) (acquired), right foot 09/19/2017    Flat feet    Personal history of other (healed) physical injury and trauma 08/29/2018    History of contusion    Personal history of other diseases of the digestive system     History of gastroesophageal reflux (GERD)    Personal history of other diseases of the digestive system 12/18/2019    History of gastroesophageal reflux (GERD)    Personal history of other diseases of the female genital tract 10/25/2016    History of ovarian cyst    Personal history of other diseases of the respiratory system 02/24/2020    History of sore throat    Personal history of other diseases of the respiratory system 08/04/2020    History of tonsillitis    Personal history of other mental and behavioral disorders     History of depression    Personal history of other specified conditions 07/19/2020    History of fever     Past Surgical History:   Procedure Laterality Date    OTHER SURGICAL HISTORY  01/16/2019    Leg surgery           Review of Systems  10 point review of systems negative except as noted in HPI.    Objective   Blood pressure 127/79, temperature 36.3 °C (97.3 °F), resp. rate 16, height 1.676 m (5' 6\"), weight 76.7 kg (169 lb), SpO2 99%.  Body mass index is 27.28 kg/m².  General: well appearing, no distress  Neck: No lymphadenopathy, no thyromegaly  CV: Regular rate and rhythm, no murmur  Lungs: Clear to auscultation bilaterally  Abdomen: Soft, nontender, nondistended  Extremities: No edema noted  Psych: Appropriate mood and affect        Assessment/Plan   37 yo here for follow-up    L arm pain- resolved, monitor    Bipolar disorder, PTSD  - Encouraged continued counseling  - Discussed safety plan at length  - Follow-up psychiatry     Weight gain  - Improving, monitor    Abnormal " labs in ER  - Check labs as ordered    Pruritus  - Obtain labs as ordered and follow-up with dermatology     RTC 1 month or sooner as needed

## 2024-08-29 ENCOUNTER — OFFICE VISIT (OUTPATIENT)
Dept: HEMATOLOGY/ONCOLOGY | Facility: HOSPITAL | Age: 39
End: 2024-08-29
Payer: COMMERCIAL

## 2024-08-29 ENCOUNTER — LAB (OUTPATIENT)
Dept: LAB | Facility: HOSPITAL | Age: 39
End: 2024-08-29
Payer: COMMERCIAL

## 2024-08-29 VITALS
WEIGHT: 167 LBS | HEART RATE: 76 BPM | OXYGEN SATURATION: 100 % | SYSTOLIC BLOOD PRESSURE: 127 MMHG | BODY MASS INDEX: 26.95 KG/M2 | DIASTOLIC BLOOD PRESSURE: 72 MMHG | TEMPERATURE: 97.7 F

## 2024-08-29 DIAGNOSIS — M79.2 NEUROPATHIC PAIN SYNDROME (NON-HERPETIC): ICD-10-CM

## 2024-08-29 DIAGNOSIS — G62.9 NEUROPATHY: ICD-10-CM

## 2024-08-29 DIAGNOSIS — D47.3 ESSENTIAL THROMBOCYTHEMIA (MULTI): ICD-10-CM

## 2024-08-29 DIAGNOSIS — D47.3 ESSENTIAL THROMBOCYTHEMIA (MULTI): Primary | ICD-10-CM

## 2024-08-29 LAB
ALBUMIN SERPL BCP-MCNC: 4.3 G/DL (ref 3.4–5)
ALP SERPL-CCNC: 106 U/L (ref 33–120)
ALT SERPL W P-5'-P-CCNC: 62 U/L (ref 7–52)
ANION GAP SERPL CALC-SCNC: 12 MMOL/L (ref 10–20)
AST SERPL W P-5'-P-CCNC: 26 U/L (ref 9–39)
BASOPHILS # BLD AUTO: 0.02 X10*3/UL (ref 0–0.1)
BASOPHILS NFR BLD AUTO: 0.5 %
BILIRUB SERPL-MCNC: 0.5 MG/DL (ref 0–1.2)
BUN SERPL-MCNC: 12 MG/DL (ref 6–23)
CALCIUM SERPL-MCNC: 8.9 MG/DL (ref 8.6–10.3)
CHLORIDE SERPL-SCNC: 108 MMOL/L (ref 98–107)
CO2 SERPL-SCNC: 21 MMOL/L (ref 21–32)
CREAT SERPL-MCNC: 1.03 MG/DL (ref 0.5–1.3)
EGFRCR SERPLBLD CKD-EPI 2021: 71 ML/MIN/1.73M*2
EOSINOPHIL # BLD AUTO: 0.02 X10*3/UL (ref 0–0.7)
EOSINOPHIL NFR BLD AUTO: 0.5 %
ERYTHROCYTE [DISTWIDTH] IN BLOOD BY AUTOMATED COUNT: 12.2 % (ref 11.5–14.5)
FERRITIN SERPL-MCNC: 497 NG/ML (ref 8–300)
FOLATE SERPL-MCNC: 12.4 NG/ML
GLUCOSE SERPL-MCNC: 87 MG/DL (ref 74–99)
HCT VFR BLD AUTO: 35.5 % (ref 36–52)
HGB BLD-MCNC: 12.4 G/DL (ref 12–17.5)
IMM GRANULOCYTES # BLD AUTO: 0.02 X10*3/UL (ref 0–0.7)
IMM GRANULOCYTES NFR BLD AUTO: 0.5 % (ref 0–0.9)
IRON SATN MFR SERPL: 28 % (ref 25–45)
IRON SERPL-MCNC: 79 UG/DL (ref 35–150)
LYMPHOCYTES # BLD AUTO: 1.71 X10*3/UL (ref 1.2–4.8)
LYMPHOCYTES NFR BLD AUTO: 42.8 %
MCH RBC QN AUTO: 38.5 PG (ref 26–34)
MCHC RBC AUTO-ENTMCNC: 34.9 G/DL (ref 32–36)
MCV RBC AUTO: 110 FL (ref 80–100)
MONOCYTES # BLD AUTO: 0.2 X10*3/UL (ref 0.1–1)
MONOCYTES NFR BLD AUTO: 5 %
NEUTROPHILS # BLD AUTO: 2.03 X10*3/UL (ref 1.2–7.7)
NEUTROPHILS NFR BLD AUTO: 50.7 %
NRBC BLD-RTO: 0 /100 WBCS (ref 0–0)
PLATELET # BLD AUTO: 386 X10*3/UL (ref 150–450)
POTASSIUM SERPL-SCNC: 3.9 MMOL/L (ref 3.5–5.3)
PROT SERPL-MCNC: 7.6 G/DL (ref 6.4–8.2)
RBC # BLD AUTO: 3.22 X10*6/UL (ref 4–5.9)
SODIUM SERPL-SCNC: 137 MMOL/L (ref 136–145)
TIBC SERPL-MCNC: 282 UG/DL (ref 240–445)
UIBC SERPL-MCNC: 203 UG/DL (ref 110–370)
VIT B12 SERPL-MCNC: 925 PG/ML (ref 211–911)
WBC # BLD AUTO: 4 X10*3/UL (ref 4.4–11.3)

## 2024-08-29 PROCEDURE — 80053 COMPREHEN METABOLIC PANEL: CPT

## 2024-08-29 PROCEDURE — 99214 OFFICE O/P EST MOD 30 MIN: CPT | Performed by: PHYSICIAN ASSISTANT

## 2024-08-29 PROCEDURE — 82746 ASSAY OF FOLIC ACID SERUM: CPT

## 2024-08-29 PROCEDURE — 85025 COMPLETE CBC W/AUTO DIFF WBC: CPT

## 2024-08-29 PROCEDURE — 82607 VITAMIN B-12: CPT

## 2024-08-29 PROCEDURE — 36415 COLL VENOUS BLD VENIPUNCTURE: CPT

## 2024-08-29 PROCEDURE — 83540 ASSAY OF IRON: CPT

## 2024-08-29 PROCEDURE — 82728 ASSAY OF FERRITIN: CPT

## 2024-08-29 RX ORDER — HYDROXYUREA 500 MG/1
1000 CAPSULE ORAL DAILY
Qty: 180 CAPSULE | Refills: 1 | Status: SHIPPED | OUTPATIENT
Start: 2024-08-29

## 2024-08-29 ASSESSMENT — PAIN SCALES - GENERAL: PAINLEVEL: 0-NO PAIN

## 2024-08-29 NOTE — PROGRESS NOTES
"Patient ID: Lillian Manriquez \"Shakeel" is a 38 y.o. adult.  Referring Physician: YOSHI BrizuelaC  [id number] [place] [address]  Primary Care Provider: Emmy Alcantara MD  Visit Type: Follow Up    Location: [facility] - Main  Diagnosis/Reason: JAK2+ ET    Interval Hx:  8/29/24  Lillian Manriquez is a 38 y.o. adult following up today for JAK2+ ET    NOTE:  Patient assigned female at birth and is transgender non-binary who prefers \"they/them/their\" as pronouns  5/30/24 - Patient was previously followed by TRAVON Peng but is now transferring care to me effective today. Their last visit was 12/14/23 and it was noted that patient has had compliance issues w/ daily adherence to Hydrea therapy (last noted 10/1-10/15/23 during patient's trip to [facility]). As of 12/14/23 it was noted that patient reported she had been taking Hydrea daily without adverse effect or reaction. The dose had been decreased from 1000mg to 500mg approximately 2 week prior to that visit (12/14/23)    Today she c/o generalized intermittent pruritus. She denies changes in lotion, soaps, detergents. She reports she is following w/ dermatology and reports she will be having an allergy test coming up and skin biopsy with that provider.    Patient denies weight loss, abnormal bruising and bleeding, hematuria, blood in stool, dark/black stools, epistaxis, oral/gingival bleeding, lymphadenopathy, recurrent infections, recurrent fevers, night sweats, early satiety, abdominal pain, bone pain, chest pain, palpitations, SOB, ANSARI, fatigue, dizziness, lightheadedness, PICA.    Relevant Hx:  12/14/23 - Last Visit w/ WALKER. TRAVON Rodriguez  Location:  Main [facility]  Dx: JAK2+ ET  Tx: hydrea, arsen Snyder is a 36 y/o female assigned at birth (transgender non-binary who prefers \"they/them/their\" pronouns) presenting for follow-up of JAK2+ ET.      Presents 11/30/23 for followup. Lost to followup since last appt 5/2023.     Reports healing issues last " 8mo from gender reassignment surgery - reports is leaking pus - is going to meet with a new surgeon and possibly get reverse surgery to fix the issues  Oct 1st-15th - went to [place] and was unable to take Hydrea then as it was confiscated and thrown out per pt. Took ASA while in [place] (only had 500mg capsules available there)  Taking Hydrea daily w/o issue - did not miss any other doses.  Has been taking Hydea 2 caps daily and ASA daily. SE hyperpigmentation. Reports chronic burning of feet and hands bilaterally had resolved overall - on gabapentin 300mg at bedtime  w/o issue but pain is back since out of gabapentin for the last 2mo.    DVT prophyllaxis periop and for 14 days post op with lovenox 40mg daily sc   Plan for surgery 1/31 at [facility] - liposuction and scar revision on breasts     12/14/23 - Here for followup appt after decreasing Hydrea from 1,000mg daily to 500mg daily 2 weeks ago -- no issues with change. Restarted Gabapentin and has not noticed much improvement in burning yet. To get surgery tomorrow now at [facility]. Had site of infection cultured and is to start antibx after surgery. Is going to transfer all care to [facility] - will see hematologist Dr. MAC Correa Dec 21st      The patient has not noted fever, chills, drenching night sweats, weight loss, bone pain, hemorrhage from any site, melena, or respiratory symptoms. No new issues.      No personal history of stroke, MI, or clot.     Completed following surgeries:  hysterectomy (Dr. Galina Mccann) and mastectomy at [facility] with Dr. River Lackey, and SRS (sexual reassignment sx) - stage 1 ant abdominal phalloplasty 3/2022; stage 2a phalloplasty 9/2022; staged urethroplasty with buccal mucosal graft and preputial graft,  clitoral transposition, L clitoral nerve neurolysis and avance nerve grafting,  clarix interposition, complex wound closure w/ tubularization of neophallus with Dr. Mario Montes on 5/10/23. Had recent emergency surgery- s/p laparoscopic R  oophorectomy on 12/10/22 for ovarian torsion.    PMHx:  Active Ambulatory Problems     Diagnosis Date Noted    Abnormal weight gain 02/15/2023    Acute pain 02/15/2023    Bipolar 1 disorder (Multi) 02/15/2023    Gender dysphoria 02/15/2023    Migraine with aura 08/11/2010    Seasonal allergic rhinitis due to pollen 02/15/2023    Trauma and stressor-related disorder 02/15/2023    Torsion of ovary, ovarian pedicle, or fallopian tube 02/15/2023    Essential thrombocythemia (Multi) 03/29/2023    Hypertriglyceridemia without hypercholesterolemia 04/28/2023    Hirsutism 04/28/2023    SCC (squamous cell carcinoma) 08/19/2023    Oral candida 08/19/2023    Pain in joint, ankle and foot 07/29/2011    Panic attacks 08/12/2014    Paranoia (psychosis) (Multi) 08/11/2010    Patent foramen ovale ([facility]) 09/20/2010    Wound infection 08/19/2023    Depression 08/11/2010    MVP (mitral valve prolapse) 09/20/2010    Mild intermittent asthma without complication ([facility]) 08/19/2023    GERD (gastroesophageal reflux disease) 06/27/2011    Flank pain 01/03/2013    Dyslexia 08/11/2010    Calculus of ureter 01/03/2013    Auditory hallucinations 09/20/2010    Anxiety 08/11/2010    Acquired equinus foot deformity 08/11/2010    Acne 11/29/2010    Abnormality of gait 09/17/2010    Insomnia 12/01/2023    Allergic rhinitis due to mold 04/15/2024    Allergic rhinitis due to animal dander 04/15/2024    Neuropathic pain syndrome (non-herpetic) 06/13/2024    Neuropathy 06/13/2024     Resolved Ambulatory Problems     Diagnosis Date Noted    Acute bronchitis 02/15/2023    Acute diarrhea 02/15/2023    Acute lower urinary tract infection 02/15/2023    Dyspareunia, female 02/15/2023    Essential thrombocytosis (Multi) 02/15/2023    Heavy menses 02/15/2023    Vaginal pain 02/15/2023     Past Medical History:   Diagnosis Date    Acute upper respiratory infection, unspecified 02/24/2020    Encounter for examination of ears and hearing without abnormal  "findings 10/12/2015    Flat foot (pes planus) (acquired), right foot 09/19/2017    Personal history of other (healed) physical injury and trauma 08/29/2018    Personal history of other diseases of the digestive system     Personal history of other diseases of the digestive system 12/18/2019    Personal history of other diseases of the female genital tract 10/25/2016    Personal history of other diseases of the respiratory system 02/24/2020    Personal history of other diseases of the respiratory system 08/04/2020    Personal history of other mental and behavioral disorders     Personal history of other specified conditions 07/19/2020     PSHx:  Past Surgical History:   Procedure Laterality Date    OTHER SURGICAL HISTORY  01/16/2019    Leg surgery      Hysterectomy (Dr. Galina Mccann)  Mastectomy at [facility] with Dr. River Lackey,   [facility] (sexual reassignment sx)  Stage 1 ant abdominal phalloplasty 3/2022;   Stage 2a phalloplasty 9/2022;   Staged urethroplasty with buccal mucosal graft and preputial graft, clitoral transposition, L clitoral nerve neurolysis and avance nerve grafting,  Clarix interposition, complex wound closure w/ tubularization of neophallus with Dr. Mario Montes on 5/10/23.   S/p laparoscopic R oophorectomy on 12/10/22 for ovarian torsion    FHx:  Family History   Problem Relation Name Age of Onset    Diabetes Mother      Heart failure Mother      Hypertension Mother      Schizophrenia Mother      COPD Father          Social Hx:  Lillian Curiel Tonynadia \"Veena\"    reports that Veena has never smoked. Veena has never used smokeless tobacco.  Veena  reports no history of alcohol use.  Veena  reports no history of drug use.  Social History     Socioeconomic History    Marital status: Single   Tobacco Use    Smoking status: Never    Smokeless tobacco: Never   Substance and Sexual Activity    Alcohol use: Never    Drug use: Never     Medications and allergies reviewed in EMR.    ROS:  Review of Systems - Oncology "   10 point review of systems negative except as state in HPI.    Vitals & Statistics:  Objective   BSA: 1.88 meters squared  /72 (BP Location: Left arm, Patient Position: Sitting, BP Cuff Size: Adult)   Pulse 76   Temp 36.5 °C (97.7 °F) (Temporal)   Wt 75.8 kg (167 lb)   LMP  (LMP Unknown)   SpO2 100%   BMI 26.95 kg/m²     Physical Exam:  Physical Exam  Vitals and nursing note reviewed.   Constitutional:       Appearance: Normal appearance. Veena is normal weight.   HENT:      Head: Normocephalic and atraumatic.      Right Ear: External ear normal.      Left Ear: External ear normal.      Nose: Nose normal.      Mouth/Throat:      Mouth: Mucous membranes are moist.      Pharynx: Oropharynx is clear.   Eyes:      Extraocular Movements: Extraocular movements intact.      Conjunctiva/sclera: Conjunctivae normal.      Pupils: Pupils are equal, round, and reactive to light.   Cardiovascular:      Rate and Rhythm: Normal rate and regular rhythm.      Pulses: Normal pulses.      Heart sounds: Normal heart sounds.   Pulmonary:      Effort: Pulmonary effort is normal.      Breath sounds: Normal breath sounds.   Abdominal:      General: Abdomen is flat. Bowel sounds are normal.      Palpations: Abdomen is soft.      Comments: No masses or organomegaly palpable during exam   Musculoskeletal:         General: Normal range of motion.      Cervical back: Normal range of motion.   Lymphadenopathy:      Comments: No lymphadenopathy palpable   Skin:     General: Skin is warm and dry.      Capillary Refill: Capillary refill takes less than 2 seconds.   Neurological:      Mental Status: Veena is alert and oriented to person, place, and time. Mental status is at baseline.   Psychiatric:         Mood and Affect: Mood normal.         Behavior: Behavior normal.         Thought Content: Thought content normal.         Judgment: Judgment normal.       Results:  Lab Results   Component Value Date    WBC 4.6 08/14/2024     "NEUTROABS 2.37 05/30/2024    IGABSOL 0.02 05/30/2024    LYMPHSABS 2.95 05/30/2024    MONOSABS 0.34 05/30/2024    EOSABS 0.04 05/30/2024    BASOSABS 0.02 05/30/2024    RBC 3.20 (L) 08/14/2024     (H) 08/14/2024    MCHC 33.8 08/14/2024    HGB 12.0 08/14/2024    HCT 35.5 (L) 08/14/2024     08/14/2024     Lab Results   Component Value Date    RETICCTPCT 3.3 (H) 07/09/2021      Lab Results   Component Value Date    CREATININE 1.12 08/14/2024    BUN 11 08/14/2024    EGFR 64 08/14/2024     08/14/2024    K 4.2 08/14/2024     08/14/2024    CO2 22 08/14/2024      Lab Results   Component Value Date    ALT 14 08/14/2024    AST 14 08/14/2024    ALKPHOS 72 08/14/2024    BILITOT 0.4 08/14/2024      Lab Results   Component Value Date    TSH 1.11 08/14/2024     Lab Results   Component Value Date    TSH 1.11 08/14/2024     Lab Results   Component Value Date    IRON 126 11/30/2023    TIBC 295 11/30/2023    FERRITIN 446 (H) 11/30/2023      Lab Results   Component Value Date    QLPJGIWD44 823 08/14/2024      No results found for: \"FOLATE\"  Lab Results   Component Value Date    SEDRATE 14 01/09/2021      Lab Results   Component Value Date    CRP 0.51 01/09/2021      No results found for: \"GINI\"  Lab Results   Component Value Date     11/09/2017     No results found for: \"HAPTOGLOBIN\"  No results found for: \"SPEP\"  No results found for: \"IGG\", \"IGM\", \"IGA\"  No results found for: \"HEPATOT\", \"HEPAIGM\", \"HEPBCIGM\", \"HEPBCAB\", \"HEPBSAG\", \"HEPCAB\"  No results found for: \"HIV1X2\"    Assessment:  Lillian Manriquez is a 38 y.o. adult following up today for JAK2+ ET    Today she c/o generalized intermittent pruritus. She denies changes in lotion, soaps, detergents. She reports she is following w/ dermatology and reports she will be having an allergy test coming up and skin biopsy with that provider.    Physical exam unremarkable    I reviewed patient's chart including but not limited to labs, imaging, " surgical/procedure notes, pathology, hospital notes, doctor's notes.    5/30/24 results:  WBC count & differential WNL  Macrocytic, hypochromic erythrocytopenia at 3.45 - Hb WNL - Hct low at 35.6% - RDW low - Likely 2/2 Hydrea therapy  Thrombocytosis at 509K    Plan:  JAK2+ ET  Lab results pending - Will review when available and address adverse results as needed  Continue Hydrea 500mg 2 capsules PO QD - Refills submitted  RTC in 4 months via in-person visit - F/U sooner if needed/urgent  CBC-D, CMP, Iron studies, B12, Folate up to 1 week    I had an extensive discussion with the patient regarding the diagnosis and discussed the plan of therapy, including general considerations regarding side effects and outcomes. Pt understood and gave appropriate teach back about the plan of care. All questions were answered to the patient's satisfaction. The patient is instructed to contact us at any time if questions or problems arise. Thank you for the opportunity to participate in the care of this very pleasant patient.    Total time = 30 minutes. 50% or more of this time was spent in counseling and/or coordination of care including reviewing medical history/radiology/labs, examining patient, formulating outlined plan with team, and discussing plan with patient/family.    Laron Calvin PA-C

## 2024-09-05 DIAGNOSIS — F31.9 BIPOLAR 1 DISORDER (MULTI): ICD-10-CM

## 2024-09-09 RX ORDER — TOPIRAMATE 50 MG/1
100 TABLET, FILM COATED ORAL NIGHTLY
Qty: 30 TABLET | Refills: 0 | Status: SHIPPED | OUTPATIENT
Start: 2024-09-09 | End: 2024-09-10 | Stop reason: SDUPTHER

## 2024-09-19 ENCOUNTER — APPOINTMENT (OUTPATIENT)
Dept: PRIMARY CARE | Facility: CLINIC | Age: 39
End: 2024-09-19
Payer: COMMERCIAL

## 2024-09-19 VITALS
SYSTOLIC BLOOD PRESSURE: 115 MMHG | HEART RATE: 65 BPM | BODY MASS INDEX: 27 KG/M2 | TEMPERATURE: 98.2 F | WEIGHT: 168 LBS | DIASTOLIC BLOOD PRESSURE: 72 MMHG | OXYGEN SATURATION: 97 % | HEIGHT: 66 IN

## 2024-09-19 DIAGNOSIS — F41.9 ANXIETY: ICD-10-CM

## 2024-09-19 DIAGNOSIS — J45.20 MILD INTERMITTENT ASTHMA WITHOUT COMPLICATION (HHS-HCC): Primary | ICD-10-CM

## 2024-09-19 DIAGNOSIS — D47.3 ESSENTIAL THROMBOCYTHEMIA (MULTI): ICD-10-CM

## 2024-09-19 DIAGNOSIS — J30.89 ALLERGIC RHINITIS DUE TO MOLD: ICD-10-CM

## 2024-09-19 RX ORDER — HYDROXYZINE HYDROCHLORIDE 10 MG/1
10 TABLET, FILM COATED ORAL 3 TIMES DAILY PRN
Qty: 90 TABLET | Refills: 0 | Status: SHIPPED | OUTPATIENT
Start: 2024-09-19 | End: 2024-09-27 | Stop reason: SDUPTHER

## 2024-09-19 RX ORDER — ALBUTEROL SULFATE 90 UG/1
2 INHALANT RESPIRATORY (INHALATION) SEE ADMIN INSTRUCTIONS
Qty: 18 G | Refills: 3 | Status: SHIPPED | OUTPATIENT
Start: 2024-09-19

## 2024-09-19 RX ORDER — FLUTICASONE PROPIONATE 50 MCG
2 SPRAY, SUSPENSION (ML) NASAL
Qty: 16 G | Refills: 3 | Status: SHIPPED | OUTPATIENT
Start: 2024-09-19

## 2024-09-19 RX ORDER — ASPIRIN 81 MG/1
81 TABLET ORAL ONCE
Qty: 1 TABLET | Refills: 0 | Status: SHIPPED | OUTPATIENT
Start: 2024-09-19 | End: 2024-09-19

## 2024-09-19 ASSESSMENT — PATIENT HEALTH QUESTIONNAIRE - PHQ9
SUM OF ALL RESPONSES TO PHQ9 QUESTIONS 1 AND 2: 4
1. LITTLE INTEREST OR PLEASURE IN DOING THINGS: MORE THAN HALF THE DAYS
2. FEELING DOWN, DEPRESSED OR HOPELESS: MORE THAN HALF THE DAYS

## 2024-09-19 ASSESSMENT — ENCOUNTER SYMPTOMS: DEPRESSION: 1

## 2024-09-19 ASSESSMENT — PAIN SCALES - GENERAL: PAINLEVEL: 7
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
## 2024-09-20 ENCOUNTER — OFFICE VISIT (OUTPATIENT)
Dept: PRIMARY CARE | Facility: CLINIC | Age: 39
End: 2024-09-20
Payer: COMMERCIAL

## 2024-09-20 VITALS
SYSTOLIC BLOOD PRESSURE: 125 MMHG | WEIGHT: 168.4 LBS | HEIGHT: 66 IN | OXYGEN SATURATION: 100 % | RESPIRATION RATE: 16 BRPM | TEMPERATURE: 97.9 F | HEART RATE: 81 BPM | DIASTOLIC BLOOD PRESSURE: 79 MMHG | BODY MASS INDEX: 27.06 KG/M2

## 2024-09-20 DIAGNOSIS — M79.2 NEUROPATHIC PAIN SYNDROME (NON-HERPETIC): ICD-10-CM

## 2024-09-20 DIAGNOSIS — F31.9 BIPOLAR 1 DISORDER (MULTI): Primary | ICD-10-CM

## 2024-09-20 DIAGNOSIS — F43.9 TRAUMA AND STRESSOR-RELATED DISORDER: ICD-10-CM

## 2024-09-20 DIAGNOSIS — Z23 NEEDS FLU SHOT: ICD-10-CM

## 2024-09-20 PROCEDURE — G0008 ADMIN INFLUENZA VIRUS VAC: HCPCS | Performed by: FAMILY MEDICINE

## 2024-09-20 PROCEDURE — 99214 OFFICE O/P EST MOD 30 MIN: CPT | Performed by: FAMILY MEDICINE

## 2024-09-20 SDOH — ECONOMIC STABILITY: FOOD INSECURITY: WITHIN THE PAST 12 MONTHS, THE FOOD YOU BOUGHT JUST DIDN'T LAST AND YOU DIDN'T HAVE MONEY TO GET MORE.: SOMETIMES TRUE

## 2024-09-20 SDOH — ECONOMIC STABILITY: FOOD INSECURITY: WITHIN THE PAST 12 MONTHS, YOU WORRIED THAT YOUR FOOD WOULD RUN OUT BEFORE YOU GOT MONEY TO BUY MORE.: SOMETIMES TRUE

## 2024-09-20 ASSESSMENT — PATIENT HEALTH QUESTIONNAIRE - PHQ9
1. LITTLE INTEREST OR PLEASURE IN DOING THINGS: NOT AT ALL
SUM OF ALL RESPONSES TO PHQ9 QUESTIONS 1 AND 2: 0
2. FEELING DOWN, DEPRESSED OR HOPELESS: NOT AT ALL

## 2024-09-20 ASSESSMENT — ENCOUNTER SYMPTOMS
LOSS OF SENSATION IN FEET: 0
DEPRESSION: 0
OCCASIONAL FEELINGS OF UNSTEADINESS: 0

## 2024-09-20 ASSESSMENT — PAIN SCALES - GENERAL: PAINLEVEL: 0-NO PAIN

## 2024-09-20 ASSESSMENT — LIFESTYLE VARIABLES: HOW MANY STANDARD DRINKS CONTAINING ALCOHOL DO YOU HAVE ON A TYPICAL DAY: PATIENT DOES NOT DRINK

## 2024-09-20 NOTE — PROGRESS NOTES
"Laz Manriquez \"Veena\" is a 38 y.o. adult who presents for follow-up    Bipolar disorder, PTSD  - Following with therapist weekly  - Declines escalating care  - Continues to have severe and worsening anxiety  - Has support from a friend   - Had a depressive episode 3-4 days ago, drank some alcohol. Otherwise has been avoiding alcohol   - Passive SI, no intent or plan. Feels safe and has crisis numbers  - Additionally with concern for autism spectrum disorder - would like this looked into   - Having paranoia - worried about someone breaking into house. Feels unsafe in current living situation  - Requesting support animal, as is able to leave house when they have that additional level of support    Additionally we briefly discussed recent surgery (healing well) and peripheral neuropathy (stable). Reviewed recent labs in chart      Comprehensive Medical and Social History  Patient Active Problem List   Diagnosis    Abnormal weight gain    Acute pain    Bipolar 1 disorder (Multi)    Gender dysphoria    Migraine with aura    Seasonal allergic rhinitis due to pollen    Trauma and stressor-related disorder    Torsion of ovary, ovarian pedicle, or fallopian tube    Essential thrombocythemia (Multi)    Hypertriglyceridemia without hypercholesterolemia    Hirsutism    SCC (squamous cell carcinoma)    Oral candida    Pain in joint, ankle and foot    Panic attacks    Paranoia (psychosis) (Multi)    Patent foramen ovale (HHS-HCC)    Wound infection    Depression    MVP (mitral valve prolapse)    Mild intermittent asthma without complication (HHS-HCC)    GERD (gastroesophageal reflux disease)    Flank pain    Dyslexia    Calculus of ureter    Auditory hallucinations    Anxiety    Acquired equinus foot deformity    Acne    Abnormality of gait    Insomnia    Allergic rhinitis due to mold    Allergic rhinitis due to animal dander    Neuropathic pain syndrome (non-herpetic)    Neuropathy     Past Medical History: " "  Diagnosis Date    Acute upper respiratory infection, unspecified 02/24/2020    Acute URI    Encounter for examination of ears and hearing without abnormal findings 10/12/2015    Examination of ears and hearing    Flat foot (pes planus) (acquired), right foot 09/19/2017    Flat feet    Personal history of other (healed) physical injury and trauma 08/29/2018    History of contusion    Personal history of other diseases of the digestive system     History of gastroesophageal reflux (GERD)    Personal history of other diseases of the digestive system 12/18/2019    History of gastroesophageal reflux (GERD)    Personal history of other diseases of the female genital tract 10/25/2016    History of ovarian cyst    Personal history of other diseases of the respiratory system 02/24/2020    History of sore throat    Personal history of other diseases of the respiratory system 08/04/2020    History of tonsillitis    Personal history of other mental and behavioral disorders     History of depression    Personal history of other specified conditions 07/19/2020    History of fever     Past Surgical History:   Procedure Laterality Date    OTHER SURGICAL HISTORY  01/16/2019    Leg surgery           Review of Systems  10 point review of systems negative except as noted in HPI.    Objective   Blood pressure 125/79, pulse 81, temperature 36.6 °C (97.9 °F), resp. rate 16, height 1.676 m (5' 6\"), weight 76.4 kg (168 lb 6.4 oz), SpO2 100%.  Body mass index is 27.18 kg/m².  General: well appearing, no distress  CV: Regular rate and rhythm, no murmur  Lungs: Clear to auscultation bilaterally  Abdomen: Soft, nontender, nondistended  Extremities: No edema noted  Psych: Anxious appearing, tearful at times, but able to engage in conversation and has logical thought process        Assessment/Plan   37 yo here for follow-up    Bipolar disorder, PTSD  - Encouraged continued counseling  - Discussed safety plan  - Follow-up psychiatry "     Peripheral neuropathy  - Monitor, stable    Flu shot today  RTC 2 months or sooner as needed

## 2024-09-20 NOTE — LETTER
To Whom It May Concern,    Lillian Manriquez (1985) is under my care. They carry a diagnosis of Bipolar, PTSD and agoraphobia. They would benefit from a service animal. Please reach out with any questions or concerns.       Sincerely,    Emmy Alcantara MD

## 2024-09-27 ENCOUNTER — APPOINTMENT (OUTPATIENT)
Dept: BEHAVIORAL HEALTH | Facility: CLINIC | Age: 39
End: 2024-09-27
Payer: COMMERCIAL

## 2024-09-27 DIAGNOSIS — G47.00 INSOMNIA, UNSPECIFIED TYPE: ICD-10-CM

## 2024-09-27 DIAGNOSIS — F41.9 ANXIETY: ICD-10-CM

## 2024-09-27 DIAGNOSIS — F43.9 TRAUMA AND STRESSOR-RELATED DISORDER: ICD-10-CM

## 2024-09-27 DIAGNOSIS — F31.9 BIPOLAR 1 DISORDER (MULTI): ICD-10-CM

## 2024-09-27 PROCEDURE — 99214 OFFICE O/P EST MOD 30 MIN: CPT | Performed by: PSYCHIATRY & NEUROLOGY

## 2024-09-27 PROCEDURE — 1036F TOBACCO NON-USER: CPT | Performed by: PSYCHIATRY & NEUROLOGY

## 2024-09-27 RX ORDER — BUSPIRONE HYDROCHLORIDE 5 MG/1
5 TABLET ORAL 2 TIMES DAILY
Qty: 60 TABLET | Refills: 2 | Status: SHIPPED | OUTPATIENT
Start: 2024-09-27 | End: 2024-12-26

## 2024-09-27 RX ORDER — BUPROPION HYDROCHLORIDE 150 MG/1
150 TABLET, EXTENDED RELEASE ORAL DAILY
Qty: 30 TABLET | Refills: 2 | Status: SHIPPED | OUTPATIENT
Start: 2024-09-27 | End: 2024-12-26

## 2024-09-27 RX ORDER — DULOXETIN HYDROCHLORIDE 20 MG/1
20 CAPSULE, DELAYED RELEASE ORAL DAILY
Qty: 30 CAPSULE | Refills: 2 | Status: SHIPPED | OUTPATIENT
Start: 2024-09-27 | End: 2024-12-26

## 2024-09-27 RX ORDER — HYDROXYZINE HYDROCHLORIDE 10 MG/1
10 TABLET, FILM COATED ORAL 3 TIMES DAILY PRN
Qty: 90 TABLET | Refills: 0 | Status: SHIPPED | OUTPATIENT
Start: 2024-09-27 | End: 2024-10-27

## 2024-09-27 RX ORDER — LAMOTRIGINE 150 MG/1
150 TABLET ORAL DAILY
Qty: 30 TABLET | Refills: 2 | Status: SHIPPED | OUTPATIENT
Start: 2024-09-27 | End: 2024-12-26

## 2024-09-27 ASSESSMENT — ENCOUNTER SYMPTOMS
DYSPHORIC MOOD: 1
NERVOUS/ANXIOUS: 0
SLEEP DISTURBANCE: 1

## 2024-09-27 NOTE — PROGRESS NOTES
"Adult Ambulatory Psychiatry Progress Note    Virtual or Telephone Consent    An interactive audio and video telecommunication system which permits real time communications between the patient (at the originating site) and provider (at the distant site) was utilized to provide this telehealth service.   Verbal consent was requested and obtained from Lillian Manriquez on this date, 09/27/24 for a telehealth visit.      Assessment/Plan     Impression:  Lillian Manriquez \"Shakeel" is a 39 y.o. 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       Plan:      Bipolar d/o - increase to lamotrigine 150mg daily, bupropion SR 150mg daily  Trauma/anxiety - buspar 5mg BID, start duloxetine 20mg daily, hydroxyzine 10mg TID prn panic, prazosin 1-3mg at bedtime, c/w med ed, psycho ed, supportive psychotherapy to build therapeutic rapport, f/u 1 months    Subjective     Chief Complaint: Bipolar, Anxiety, and PTSD (Post-Traumatic Stress Disorder)    HPI:  Pt arrived on time. They're having a lot of pain since they stopped the gabapentin and pregablin. They feel more focused now but having a lot of pain. They're frustrated that they don't know where the pain is from so they don't know how to pursue a solution. They're also feeling more depressed and anxious from the pain. Discussed adding cymbalta. Because of risk for benji suggested increasing lamotrigine as well. Pt also needs JOHNNY letter for their PTSD. The dog helps by standing between pt and others and will lie on pt chest if they're having PTSD related panic.           Review of Systems   Psychiatric/Behavioral:  Positive for dysphoric mood and sleep disturbance. Negative for suicidal ideas. The patient is not nervous/anxious.          Objective   Mental Status Exam:  General Appearance: Well groomed, appropriate eye contact  Attitude/Behavior: Cooperative  Motor: No " psychomotor agitation or retardation, no tremor or other abnormal movements  Speech: Other: (comment) (slightly pressured but interruptible)  Mood: depressed  Affect: Irritable  Thought Process: Circumstantial  Thought Associations: No loosening of associations  Thought Content: Normal  Perception: No perceptual abnormalities noted  Insight: Intact  Judgement: Intact    Vitals:  There were no vitals filed for this visit.    Current Medications:  Current Outpatient Medications on File Prior to Visit   Medication Sig Dispense Refill    acetaminophen (Tylenol) 500 mg tablet Take 1-2 tablets (500-1,000 mg) by mouth every 6 hours if needed for mild pain (1 - 3).      albuterol 90 mcg/actuation inhaler Inhale 2 puffs see administration instructions. EVERY 4-6 HOURS AS NEEDED. 18 g 3    cetirizine (ZyrTEC) 10 mg tablet Take 1 tablet (10 mg) by mouth once daily.      cholecalciferol (Vitamin D-3) 25 MCG (1000 UT) tablet Take 2 tablets (2,000 Units) by mouth once daily.      clobetasol (Temovate) 0.05 % ointment Apply 1 Application topically if needed. APPLY TO MOSQUITO BITES AS NEEDED      docusate sodium (Colace) 100 mg capsule Take 1 capsule (100 mg) by mouth twice a day.      dupilumab (Dupixent) 300 mg/2 mL pen injector Inject 2 mL (300 mg) under the skin every 14 (fourteen) days.      enoxaparin (Lovenox) 40 mg/0.4 mL syringe       fexofenadine (Allegra) 180 mg tablet Take 1 tablet (180 mg) by mouth once daily.      fluticasone (Flonase) 50 mcg/actuation nasal spray Administer 2 sprays into each nostril once daily. 16 g 3    hydrocortisone 2.5 % cream Apply topically once daily. 30 g 5    hydroxyurea (Hydrea) 500 mg capsule Take 2 capsules (1,000 mg total) by mouth once daily.  Take at the same time each day. 180 capsule 1    inhaler, assist devices (E-Z SPACER MISC) 1 Units see administration instructions. USE WITH INHALER AS DIRECTED      levocetirizine (Xyzal) 5 mg tablet Take 1 tablet (5 mg) by mouth once daily. 30  tablet 11    lidocaine (Lidoderm) 5 % patch Place 1 patch over 12 hours on the skin once daily. Apply to painful area 12 hours per day, remove for 12 hours. (Patient not taking: Reported on 9/19/2024) 30 patch 1    methocarbamol (Robaxin) 750 mg tablet TAKE 1 TABLET BY MOUTH EVERY 6 HOURS AS NEEDED FOR 3 DAYS      prazosin (Minipress) 1 mg capsule Take 1-3 capsules (1-3 mg) by mouth once daily at bedtime. 90 capsule 2    topiramate (Topamax) 50 mg tablet Take 2 tablets (100 mg) by mouth once daily at bedtime. 60 tablet 5    triamcinolone (Kenalog) 0.1 % ointment Apply 1 Application topically if needed (APPLY ONE APPLICATION TOPICALLY ON ARMS/LEGS DAILY AS NEEDED FOR DRY AREAS AND FOR DARK BUMPS).      [DISCONTINUED] buPROPion SR (Wellbutrin SR) 150 mg 12 hr tablet Take 1 tablet (150 mg) by mouth once daily. Do not crush, chew, or split. 30 tablet 2    [DISCONTINUED] busPIRone (Buspar) 5 mg tablet Take 1 tablet (5 mg) by mouth 2 times a day. 60 tablet 2    [DISCONTINUED] hydrOXYzine HCL (Atarax) 10 mg tablet Take 1 tablet (10 mg) by mouth 3 times a day as needed for anxiety. 90 tablet 0    [DISCONTINUED] lamoTRIgine (LaMICtal) 100 mg tablet Take 1 tablet (100 mg) by mouth once daily. 30 tablet 2     Current Facility-Administered Medications on File Prior to Visit   Medication Dose Route Frequency Provider Last Rate Last Admin    ALLERGEN REFILL VIAL 1 & 2   subcutaneous PRN Princess TANIA Morales MD           Lab Review:   Lab on 08/29/2024   Component Date Value    Glucose 08/29/2024 87     Sodium 08/29/2024 137     Potassium 08/29/2024 3.9     Chloride 08/29/2024 108 (H)     Bicarbonate 08/29/2024 21     Anion Gap 08/29/2024 12     Urea Nitrogen 08/29/2024 12     Creatinine 08/29/2024 1.03     eGFR 08/29/2024 71     Calcium 08/29/2024 8.9     Albumin 08/29/2024 4.3     Alkaline Phosphatase 08/29/2024 106     Total Protein 08/29/2024 7.6     AST 08/29/2024 26     Bilirubin, Total 08/29/2024 0.5     ALT 08/29/2024 62 (H)      WBC 08/29/2024 4.0 (L)     nRBC 08/29/2024 0.0     RBC 08/29/2024 3.22 (L)     Hemoglobin 08/29/2024 12.4     Hematocrit 08/29/2024 35.5 (L)     MCV 08/29/2024 110 (H)     MCH 08/29/2024 38.5 (H)     MCHC 08/29/2024 34.9     RDW 08/29/2024 12.2     Platelets 08/29/2024 386     Neutrophils % 08/29/2024 50.7     Immature Granulocytes %,* 08/29/2024 0.5     Lymphocytes % 08/29/2024 42.8     Monocytes % 08/29/2024 5.0     Eosinophils % 08/29/2024 0.5     Basophils % 08/29/2024 0.5     Neutrophils Absolute 08/29/2024 2.03     Immature Granulocytes Ab* 08/29/2024 0.02     Lymphocytes Absolute 08/29/2024 1.71     Monocytes Absolute 08/29/2024 0.20     Eosinophils Absolute 08/29/2024 0.02     Basophils Absolute 08/29/2024 0.02     Ferritin 08/29/2024 497 (H)     Iron 08/29/2024 79     UIBC 08/29/2024 203     TIBC 08/29/2024 282     % Saturation 08/29/2024 28     Folate, Serum 08/29/2024 12.4     Vitamin B12 08/29/2024 925 (H)    Office Visit on 08/14/2024   Component Date Value    Glucose 08/14/2024 87     Sodium 08/14/2024 138     Potassium 08/14/2024 4.2     Chloride 08/14/2024 106     Bicarbonate 08/14/2024 22     Anion Gap 08/14/2024 14     Urea Nitrogen 08/14/2024 11     Creatinine 08/14/2024 1.12     eGFR 08/14/2024 64     Calcium 08/14/2024 10.1     Albumin 08/14/2024 4.5     Alkaline Phosphatase 08/14/2024 72     Total Protein 08/14/2024 7.5     AST 08/14/2024 14     Bilirubin, Total 08/14/2024 0.4     ALT 08/14/2024 14     WBC 08/14/2024 4.6     nRBC 08/14/2024 0.0     RBC 08/14/2024 3.20 (L)     Hemoglobin 08/14/2024 12.0     Hematocrit 08/14/2024 35.5 (L)     MCV 08/14/2024 111 (H)     MCH 08/14/2024 37.5 (H)     MCHC 08/14/2024 33.8     RDW 08/14/2024 12.9     Platelets 08/14/2024 446     Thyroid Stimulating Horm* 08/14/2024 1.11     Cholesterol 08/14/2024 155     HDL-Cholesterol 08/14/2024 29.2     Cholesterol/HDL Ratio 08/14/2024 5.3     LDL Calculated 08/14/2024 72     VLDL 08/14/2024 54 (H)      Triglycerides 08/14/2024 269 (H)     Non HDL Cholesterol 08/14/2024 126     Vitamin B12 08/14/2024 823        Orders:  Diagnoses and all orders for this visit:  Bipolar 1 disorder (Multi)  -     Follow Up In Psychiatry  -     lamoTRIgine (LaMICtal) 150 mg tablet; Take 1 tablet (150 mg) by mouth once daily.  -     buPROPion SR (Wellbutrin SR) 150 mg 12 hr tablet; Take 1 tablet (150 mg) by mouth once daily. Do not crush, chew, or split.  -     Follow Up In Psychiatry; Future  Anxiety  -     DULoxetine (Cymbalta) 20 mg DR capsule; Take 1 capsule (20 mg) by mouth once daily. Do not crush or chew.  -     hydrOXYzine HCL (Atarax) 10 mg tablet; Take 1 tablet (10 mg) by mouth 3 times a day as needed for anxiety.  -     busPIRone (Buspar) 5 mg tablet; Take 1 tablet (5 mg) by mouth 2 times a day.  Trauma and stressor-related disorder  Insomnia, unspecified type    Risk Assessment:  Risk of harm to self: Medium Risk - Risk factors include: {Depression, History of impulsivity and/or aggressive behavior , History of trauma or abuse , Loss (relational, social, occupational, financial) , and Medical illness comorbidity , Protective Factors include: Denies current suicidal ideation, Denies history of suicide attempts , Future-oriented talk , Willingness to seek help and support , Age, Access to a variety of clinical interventions , Receiving and engaged in care for mental, physical, and substance use disorders , Support through ongoing medical and mental healthcare relationships , and Current/history of good response to treatment/meds     Risk of harm to others: Low Risk - Risk factors include: No significant risk factors identified on screening. Protective factors include: Lack of known history of harm to others , Lack of known history of violent ideation , Lack of known access to firearms , Sense of community, availability/access to resources and support , Sense of optimism, hope , Interpersonal competence , Affect regulation ,  Sense of self-efficacy, internal locus of control , and Positive, pro-social family/peer network     Next Appointment:  Follow up in 1 month (on 10/29/2024).

## 2024-10-02 ENCOUNTER — CLINICAL SUPPORT (OUTPATIENT)
Dept: ALLERGY | Facility: HOSPITAL | Age: 39
End: 2024-10-02
Payer: COMMERCIAL

## 2024-10-02 VITALS — DIASTOLIC BLOOD PRESSURE: 80 MMHG | TEMPERATURE: 98.2 F | SYSTOLIC BLOOD PRESSURE: 128 MMHG | HEART RATE: 82 BPM

## 2024-10-02 DIAGNOSIS — J30.81 ALLERGIC RHINITIS DUE TO ANIMAL DANDER: ICD-10-CM

## 2024-10-02 DIAGNOSIS — J30.89 ALLERGIC RHINITIS DUE TO MOLD: ICD-10-CM

## 2024-10-02 PROCEDURE — 95117 IMMUNOTHERAPY INJECTIONS: CPT | Performed by: ALLERGY & IMMUNOLOGY

## 2024-10-02 PROCEDURE — INJT2 ALLERGY IMMUNOTHERAPY 2+ INJECTIONS: Performed by: ALLERGY & IMMUNOLOGY

## 2024-10-02 NOTE — ADDENDUM NOTE
Addended by: RACHANA EMERY on: 10/2/2024 03:50 PM     Modules accepted: Orders     Attending Attestation (For Attendings USE Only)...

## 2024-10-02 NOTE — PROGRESS NOTES
Outpatient Physical Therapy  DAILY TREATMENT     [facility] [address]  [address]  [place] [address]  Phone:  [phone]  Fax:  [phone]    Date: 03/08/2018    Patient: Av Bob  YOB: 1948  MRN: [id number]     Time Calculation  Start time: 0930  Stop time: 1030 Time Calculation (min): 60 minutes     Chief Complaint: Impaired mobility, limited gait   Visit #: 23    SUBJECTIVE:  Patient reports no new changes. Received L AFO and has it present for session, has not tried to ambulate with AFO on.   Supine <> sit: Lesia     OBJECTIVE:  Current objective measures:   Noted increased L knee flexor tone today, difficulty to obtain full extension aprox -30 degrees with PROM// approx -5 at end of session.          Therapeutic Exercises (CPT 97937):     1. L LE PROM , x 15 L LE hip/knee flexion into extension, incorporating ryhthmic stabilization in supine and sitting positiong     2. Supine bridging , 2 x 15 ,  manual cueing for  L LE position and verbal cueing to patient to stay focused on task     3. Hooklying resisted adduction , 2 x 15     4. Alternating LE abduction in hooklying with pink band , 2 x 10     Therapeutic Treatments and Modalities:     1. Neuromuscular Re-education (CPT 90774), sit to stand x 10 from elevated surface aprox 30 inches cueing for anterior WS with L AFO donned. Once in standing midline orientation and maintaining balance for 30 seconds increments. , Patient requiring Lesia with balance     2. Gait Training (CPT 02779), Gait with platform FWW, 45 ft Lesia with tactile facilitation for L LE activation in stance with L anterolateral WS     Time-based treatments/modalities:  Gait training minutes (CPT 64685): 10 minutes  Therapeutic exercise minutes (CPT 65414): 30 minutes  Neuromusc re-ed, balance, coor, post minutes (CPT 68940): 20 minutes       Pain rating before treatment: 0  Pain rating after treatment: 0    ASSESSMENT:  Lillian here today for Veena's regularly scheduled immunotherapy injection, per protocol. Patient in good state of health, received Veena's shot as planned, as recorded in flowsheet for this encounter, waited for 30 minutes after Veena's injection and left the office after that still at their baseline state of health. Will continue allergy immunotherapy as planned and call us in case any symptoms or reaction from today's injection are noted.      Response to treatment: Patient demonstrated increase tone today with L knee flexors able to change with movement and PROM. Patient continues to demonstrate decreased use of L side with functional activities and decreased LE strength limiting ability to to transfer out of the W/C without assistance. Recommend continued strengthening and neuro re-ed to incorporate increased patient independence.     PLAN/RECOMMENDATIONS:   Plan for treatment: therapy treatment to continue next visit.  Planned interventions for next visit: gait training (CPT 62176), manual therapy (CPT 01859), neuromuscular re-education (CPT 04886) and therapeutic exercise (CPT 52585).

## 2024-10-03 ENCOUNTER — APPOINTMENT (OUTPATIENT)
Dept: PAIN MEDICINE | Facility: CLINIC | Age: 39
End: 2024-10-03
Payer: COMMERCIAL

## 2024-10-08 NOTE — PROGRESS NOTES
I reviewed the resident/fellow's documentation and discussed the patient with the resident/fellow. I agree with the resident/fellow's medical decision making as documented in the note.    Lauren Rivera MD

## 2024-10-21 ENCOUNTER — TELEPHONE (OUTPATIENT)
Dept: ALLERGY | Facility: HOSPITAL | Age: 39
End: 2024-10-21
Payer: COMMERCIAL

## 2024-10-22 ENCOUNTER — APPOINTMENT (OUTPATIENT)
Dept: BEHAVIORAL HEALTH | Facility: CLINIC | Age: 39
End: 2024-10-22
Payer: COMMERCIAL

## 2024-10-22 DIAGNOSIS — G47.00 INSOMNIA, UNSPECIFIED TYPE: ICD-10-CM

## 2024-10-22 DIAGNOSIS — F43.9 TRAUMA AND STRESSOR-RELATED DISORDER: ICD-10-CM

## 2024-10-22 DIAGNOSIS — F31.9 BIPOLAR 1 DISORDER (MULTI): Primary | ICD-10-CM

## 2024-10-22 DIAGNOSIS — F41.9 ANXIETY: ICD-10-CM

## 2024-10-22 PROCEDURE — 99213 OFFICE O/P EST LOW 20 MIN: CPT | Performed by: PSYCHIATRY & NEUROLOGY

## 2024-10-22 RX ORDER — BUPROPION HYDROCHLORIDE 150 MG/1
150 TABLET, EXTENDED RELEASE ORAL DAILY
Qty: 30 TABLET | Refills: 5 | Status: SHIPPED | OUTPATIENT
Start: 2024-10-22 | End: 2025-04-20

## 2024-10-22 RX ORDER — DULOXETIN HYDROCHLORIDE 20 MG/1
20 CAPSULE, DELAYED RELEASE ORAL DAILY
Qty: 30 CAPSULE | Refills: 5 | Status: SHIPPED | OUTPATIENT
Start: 2024-10-22 | End: 2025-04-20

## 2024-10-22 RX ORDER — BUSPIRONE HYDROCHLORIDE 5 MG/1
5 TABLET ORAL 2 TIMES DAILY
Qty: 60 TABLET | Refills: 5 | Status: SHIPPED | OUTPATIENT
Start: 2024-10-22 | End: 2025-04-20

## 2024-10-22 RX ORDER — TOPIRAMATE 50 MG/1
100 TABLET, FILM COATED ORAL NIGHTLY
Qty: 60 TABLET | Refills: 5 | Status: SHIPPED | OUTPATIENT
Start: 2024-10-22

## 2024-10-22 RX ORDER — LAMOTRIGINE 150 MG/1
150 TABLET ORAL DAILY
Qty: 30 TABLET | Refills: 5 | Status: SHIPPED | OUTPATIENT
Start: 2024-10-22 | End: 2025-04-20

## 2024-10-22 ASSESSMENT — PATIENT HEALTH QUESTIONNAIRE - PHQ9
2. FEELING DOWN, DEPRESSED OR HOPELESS: SEVERAL DAYS
10. IF YOU CHECKED OFF ANY PROBLEMS, HOW DIFFICULT HAVE THESE PROBLEMS MADE IT FOR YOU TO DO YOUR WORK, TAKE CARE OF THINGS AT HOME, OR GET ALONG WITH OTHER PEOPLE: NOT DIFFICULT AT ALL
SUM OF ALL RESPONSES TO PHQ9 QUESTIONS 1 AND 2: 1
1. LITTLE INTEREST OR PLEASURE IN DOING THINGS: NOT AT ALL

## 2024-10-22 NOTE — PROGRESS NOTES
"Adult Ambulatory Psychiatry Progress Note    Virtual or Telephone Consent    An interactive audio and video telecommunication system which permits real time communications between the patient (at the originating site) and provider (at the distant site) was utilized to provide this telehealth service.   Verbal consent was requested and obtained from Lillian Manriquez on this date, 10/23/24 for a telehealth visit.      Assessment/Plan     Impression:  Lillian Manriquez \"Shakeel" is a 39 y.o. 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       Plan:      Bipolar d/o - lamotrigine 150mg daily, bupropion SR 150mg daily  Trauma/anxiety - buspar 5mg BID, duloxetine 20mg daily, hydroxyzine 10mg TID prn panic, prazosin 1-3mg at bedtime, c/w med ed, psycho ed, supportive psychotherapy to build therapeutic rapport, f/u 1 months    Subjective     Chief Complaint: Bipolar, Anxiety, and PTSD (Post-Traumatic Stress Disorder)    HPI:  Pt arrived on time. They were able to increase the lamotrigine. They've felt a little sleepy. They were also able to start the duloxetine. They don't feel as depressed now. They're also able to get up earlier and \"not having freak-outs\". Their therapist noticed a different as well and feel it's helping. The pain seems to have improved. They still have it but \"it's manageable\". They're going to bed at 11pm-midnight which is earlier than before. They wake around 7-8am. They lie in bed and sometimes fall asleep again if it's 7am. They feel good enough to drive for [employer] and they're getting help from [facility]. 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 They used to but were let go during the pandemic.          Review of Systems   Psychiatric/Behavioral:  Negative for dysphoric mood and suicidal ideas. The patient is not nervous/anxious.          Objective   Mental Status " "Exam:  General Appearance: Well groomed, appropriate eye contact  Attitude/Behavior: Cooperative  Motor: No psychomotor agitation or retardation, no tremor or other abnormal movements  Speech: Normal rate, volume, prosody  Mood: \"better\"  Affect: Euthymic, full-range  Thought Process: Linear, goal directed  Thought Associations: No loosening of associations  Thought Content: Normal  Perception: No perceptual abnormalities noted  Insight: Intact  Judgement: Intact    Vitals:  There were no vitals filed for this visit.    Current Medications:  Current Outpatient Medications on File Prior to Visit   Medication Sig Dispense Refill    acetaminophen (Tylenol) 500 mg tablet Take 1-2 tablets (500-1,000 mg) by mouth every 6 hours if needed for mild pain (1 - 3).      albuterol 90 mcg/actuation inhaler Inhale 2 puffs see administration instructions. EVERY 4-6 HOURS AS NEEDED. 18 g 3    cetirizine (ZyrTEC) 10 mg tablet Take 1 tablet (10 mg) by mouth once daily.      cholecalciferol (Vitamin D-3) 25 MCG (1000 UT) tablet Take 2 tablets (2,000 Units) by mouth once daily.      clobetasol (Temovate) 0.05 % ointment Apply 1 Application topically if needed. APPLY TO [place]QUITO BITES AS NEEDED      docusate sodium (Colace) 100 mg capsule Take 1 capsule (100 mg) by mouth twice a day.      dupilumab (Dupixent) 300 mg/2 mL pen injector Inject 2 mL (300 mg) under the skin every 14 (fourteen) days.      enoxaparin (Lovenox) 40 mg/0.4 mL syringe       fexofenadine (Allegra) 180 mg tablet Take 1 tablet (180 mg) by mouth once daily.      fluticasone (Flonase) 50 mcg/actuation nasal spray Administer 2 sprays into each nostril once daily. 16 g 3    hydrocortisone 2.5 % cream Apply topically once daily. 30 g 5    hydroxyurea (Hydrea) 500 mg capsule Take 2 capsules (1,000 mg total) by mouth once daily.  Take at the same time each day. 180 capsule 1    hydrOXYzine HCL (Atarax) 10 mg tablet Take 1 tablet (10 mg) by mouth 3 times a day as needed for " anxiety. 90 tablet 0    inhaler, assist devices (E-Z SPACER MISC) 1 Units see administration instructions. USE WITH INHALER AS DIRECTED      levocetirizine (Xyzal) 5 mg tablet Take 1 tablet (5 mg) by mouth once daily. 30 tablet 11    lidocaine (Lidoderm) 5 % patch Place 1 patch over 12 hours on the skin once daily. Apply to painful area 12 hours per day, remove for 12 hours. (Patient not taking: Reported on 9/19/2024) 30 patch 1    methocarbamol (Robaxin) 750 mg tablet TAKE 1 TABLET BY MOUTH EVERY 6 HOURS AS NEEDED FOR 3 DAYS      prazosin (Minipress) 1 mg capsule Take 1-3 capsules (1-3 mg) by mouth once daily at bedtime. 90 capsule 2    triamcinolone (Kenalog) 0.1 % ointment Apply 1 Application topically if needed (APPLY ONE APPLICATION TOPICALLY ON ARMS/LEGS DAILY AS NEEDED FOR DRY AREAS AND FOR DARK BUMPS).      [DISCONTINUED] buPROPion SR (Wellbutrin SR) 150 mg 12 hr tablet Take 1 tablet (150 mg) by mouth once daily. Do not crush, chew, or split. 30 tablet 2    [DISCONTINUED] busPIRone (Buspar) 5 mg tablet Take 1 tablet (5 mg) by mouth 2 times a day. 60 tablet 2    [DISCONTINUED] DULoxetine (Cymbalta) 20 mg DR capsule Take 1 capsule (20 mg) by mouth once daily. Do not crush or chew. 30 capsule 2    [DISCONTINUED] lamoTRIgine (LaMICtal) 150 mg tablet Take 1 tablet (150 mg) by mouth once daily. 30 tablet 2    [DISCONTINUED] topiramate (Topamax) 50 mg tablet Take 2 tablets (100 mg) by mouth once daily at bedtime. 60 tablet 5     Current Facility-Administered Medications on File Prior to Visit   Medication Dose Route Frequency Provider Last Rate Last Admin    ALLERGEN REFILL VIAL 1 & 2   subcutaneous PRN Princess TANIA Morales MD           Lab Review:   Lab on 08/29/2024   Component Date Value    Glucose 08/29/2024 87     Sodium 08/29/2024 137     Potassium 08/29/2024 3.9     Chloride 08/29/2024 108 (H)     Bicarbonate 08/29/2024 21     Anion Gap 08/29/2024 12     Urea Nitrogen 08/29/2024 12     Creatinine 08/29/2024  1.03     eGFR 08/29/2024 71     Calcium 08/29/2024 8.9     Albumin 08/29/2024 4.3     Alkaline Phosphatase 08/29/2024 106     Total Protein 08/29/2024 7.6     AST 08/29/2024 26     Bilirubin, Total 08/29/2024 0.5     ALT 08/29/2024 62 (H)     WBC 08/29/2024 4.0 (L)     nRBC 08/29/2024 0.0     RBC 08/29/2024 3.22 (L)     Hemoglobin 08/29/2024 12.4     Hematocrit 08/29/2024 35.5 (L)     MCV 08/29/2024 110 (H)     MCH 08/29/2024 38.5 (H)     MCHC 08/29/2024 34.9     RDW 08/29/2024 12.2     Platelets 08/29/2024 386     Neutrophils % 08/29/2024 50.7     Immature Granulocytes %,* 08/29/2024 0.5     Lymphocytes % 08/29/2024 42.8     Monocytes % 08/29/2024 5.0     Eosinophils % 08/29/2024 0.5     Basophils % 08/29/2024 0.5     Neutrophils Absolute 08/29/2024 2.03     Immature Granulocytes Ab* 08/29/2024 0.02     Lymphocytes Absolute 08/29/2024 1.71     Monocytes Absolute 08/29/2024 0.20     Eosinophils Absolute 08/29/2024 0.02     Basophils Absolute 08/29/2024 0.02     Ferritin 08/29/2024 497 (H)     Iron 08/29/2024 79     UIBC 08/29/2024 203     TIBC 08/29/2024 282     % Saturation 08/29/2024 28     Folate, Serum 08/29/2024 12.4     Vitamin B12 08/29/2024 925 (H)        Orders:  Diagnoses and all orders for this visit:  Bipolar 1 disorder (Multi)  -     buPROPion SR (Wellbutrin SR) 150 mg 12 hr tablet; Take 1 tablet (150 mg) by mouth once daily. Do not crush, chew, or split.  -     lamoTRIgine (LaMICtal) 150 mg tablet; Take 1 tablet (150 mg) by mouth once daily.  -     topiramate (Topamax) 50 mg tablet; Take 2 tablets (100 mg) by mouth once daily at bedtime.  -     Follow Up In Psychiatry; Future  Anxiety  -     busPIRone (Buspar) 5 mg tablet; Take 1 tablet (5 mg) by mouth 2 times a day.  -     DULoxetine (Cymbalta) 20 mg DR capsule; Take 1 capsule (20 mg) by mouth once daily. Do not crush or chew.  Trauma and stressor-related disorder  Insomnia, unspecified type      Risk Assessment:  Risk of harm to self: Medium Risk -  Risk factors include: {Depression, History of impulsivity and/or aggressive behavior , History of trauma or abuse , Loss (relational, social, occupational, financial) , and Medical illness comorbidity , Protective Factors include: Denies current suicidal ideation, Denies history of suicide attempts , Future-oriented talk , Willingness to seek help and support , Age, Access to a variety of clinical interventions , Receiving and engaged in care for mental, physical, and substance use disorders , Support through ongoing medical and mental healthcare relationships , and Current/history of good response to treatment/meds     Risk of harm to others: Low Risk - Risk factors include: No significant risk factors identified on screening. Protective factors include: Lack of known history of harm to others , Lack of known history of violent ideation , Lack of known access to firearms , Sense of community, availability/access to resources and support , Sense of optimism, hope , Interpersonal competence , Affect regulation , Sense of self-efficacy, internal locus of control , and Positive, pro-social family/peer network     Next Appointment:  Follow up in 3 months (on 1/16/2025).

## 2024-10-23 ASSESSMENT — ENCOUNTER SYMPTOMS
NERVOUS/ANXIOUS: 0
DYSPHORIC MOOD: 0

## 2024-10-29 ENCOUNTER — APPOINTMENT (OUTPATIENT)
Dept: BEHAVIORAL HEALTH | Facility: CLINIC | Age: 39
End: 2024-10-29
Payer: COMMERCIAL

## 2024-11-14 ENCOUNTER — APPOINTMENT (OUTPATIENT)
Dept: PRIMARY CARE | Facility: CLINIC | Age: 39
End: 2024-11-14
Payer: COMMERCIAL

## 2024-11-15 ENCOUNTER — APPOINTMENT (OUTPATIENT)
Dept: PRIMARY CARE | Facility: CLINIC | Age: 39
End: 2024-11-15
Payer: COMMERCIAL

## 2024-11-18 DIAGNOSIS — J30.81 ALLERGIC RHINITIS DUE TO ANIMAL DANDER: ICD-10-CM

## 2024-11-18 DIAGNOSIS — J30.1 SEASONAL ALLERGIC RHINITIS DUE TO POLLEN: Primary | ICD-10-CM

## 2024-12-02 ENCOUNTER — TELEPHONE (OUTPATIENT)
Dept: ALLERGY | Facility: CLINIC | Age: 39
End: 2024-12-02
Payer: COMMERCIAL

## 2024-12-02 NOTE — TELEPHONE ENCOUNTER
Called patient to schedule allergy shots. Patient notified build will go back to weekly shots. Will speak with md regarding next dose and build up process

## 2024-12-04 ENCOUNTER — CLINICAL SUPPORT (OUTPATIENT)
Dept: ALLERGY | Facility: HOSPITAL | Age: 39
End: 2024-12-04
Payer: COMMERCIAL

## 2024-12-04 VITALS
BODY MASS INDEX: 28.06 KG/M2 | SYSTOLIC BLOOD PRESSURE: 132 MMHG | WEIGHT: 174.6 LBS | HEIGHT: 66 IN | DIASTOLIC BLOOD PRESSURE: 78 MMHG | TEMPERATURE: 98 F | HEART RATE: 97 BPM

## 2024-12-04 DIAGNOSIS — J30.81 ALLERGIC RHINITIS DUE TO ANIMAL DANDER: ICD-10-CM

## 2024-12-04 DIAGNOSIS — J30.89 ALLERGIC RHINITIS DUE TO MOLD: ICD-10-CM

## 2024-12-04 DIAGNOSIS — J30.1 SEASONAL ALLERGIC RHINITIS DUE TO POLLEN: ICD-10-CM

## 2024-12-04 PROCEDURE — INJT2 ALLERGY IMMUNOTHERAPY 2+ INJECTIONS: Performed by: ALLERGY & IMMUNOLOGY

## 2024-12-04 PROCEDURE — 95117 IMMUNOTHERAPY INJECTIONS: CPT | Performed by: ALLERGY & IMMUNOLOGY

## 2024-12-04 NOTE — PROGRESS NOTES
Lillian is here today for Veena's regularly scheduled immunotherapy injection per protocol. Patient denies recent illness, delayed reaction after last injection, or asthma exacerbation. Patient reports taking antihistimine before today's visit. Lungs clear and equal bilaterally. Dose decreased to 1:1 0.3 mL for late injections per Dr Morales. Plan to do biweekly rebuild, next injection 1:1 0.4 ml then 1:1 0.5 ml. Patient received Veena's injection as recorded in flowsheet. Patient monitored for 30 minutes after injection and left the office at [facility]. Will continue allergy immunotherapy as planned. Patient instructed to call RN line if new symptoms or reaction from today's injection are noted.

## 2024-12-04 NOTE — TELEPHONE ENCOUNTER
Per Dr. Morales: Please drop back 2 doses, and can offer every 2 week build up (0.3, 0.4, 0.5) back to monthly.  If there are new vials during this time, will need to step back accordingly.     You  Princess TANIA Morales MD2 days ago       Called and scheduled shots for veena today. They were monthly but then fell off the monthly injection schedule. Last dose was on October 2nd was 1:1, 0.4 mL (for late shot, not new vials I dont think but hard to say without being able to see their shot sheet) They missed their 10/21 appt. What dose should they get 12/4 and what kind of build back to maintenance do you want? Veena said they can't really do weekly shots and I tried to explain to them that if they only come monthly they would end up going backwards in the process.

## 2024-12-11 ENCOUNTER — APPOINTMENT (OUTPATIENT)
Dept: ALLERGY | Facility: HOSPITAL | Age: 39
End: 2024-12-11
Payer: COMMERCIAL

## 2024-12-16 ENCOUNTER — CLINICAL SUPPORT (OUTPATIENT)
Dept: ALLERGY | Facility: HOSPITAL | Age: 39
End: 2024-12-16
Payer: COMMERCIAL

## 2024-12-16 VITALS — SYSTOLIC BLOOD PRESSURE: 116 MMHG | DIASTOLIC BLOOD PRESSURE: 71 MMHG | TEMPERATURE: 98.3 F | HEART RATE: 99 BPM

## 2024-12-16 DIAGNOSIS — J30.89 ALLERGIC RHINITIS DUE TO MOLD: ICD-10-CM

## 2024-12-16 DIAGNOSIS — J30.81 ALLERGIC RHINITIS DUE TO ANIMAL DANDER: ICD-10-CM

## 2024-12-16 PROCEDURE — 95117 IMMUNOTHERAPY INJECTIONS: CPT | Performed by: ALLERGY & IMMUNOLOGY

## 2024-12-16 PROCEDURE — INJT2 ALLERGY IMMUNOTHERAPY 2+ INJECTIONS: Performed by: ALLERGY & IMMUNOLOGY

## 2024-12-23 DIAGNOSIS — J30.89 ALLERGIC RHINITIS DUE TO MOLD: ICD-10-CM

## 2024-12-23 DIAGNOSIS — J30.1 SEASONAL ALLERGIC RHINITIS DUE TO POLLEN: ICD-10-CM

## 2024-12-23 DIAGNOSIS — J30.81 ALLERGIC RHINITIS DUE TO ANIMAL DANDER: Primary | ICD-10-CM

## 2024-12-23 PROCEDURE — 95165 ANTIGEN THERAPY SERVICES: CPT | Performed by: ALLERGY & IMMUNOLOGY

## 2025-01-08 ENCOUNTER — CLINICAL SUPPORT (OUTPATIENT)
Dept: ALLERGY | Facility: HOSPITAL | Age: 40
End: 2025-01-08
Payer: COMMERCIAL

## 2025-01-08 VITALS
DIASTOLIC BLOOD PRESSURE: 85 MMHG | OXYGEN SATURATION: 100 % | HEART RATE: 88 BPM | TEMPERATURE: 98 F | SYSTOLIC BLOOD PRESSURE: 126 MMHG

## 2025-01-08 DIAGNOSIS — J30.1 SEASONAL ALLERGIC RHINITIS DUE TO POLLEN: ICD-10-CM

## 2025-01-08 DIAGNOSIS — J30.81 ALLERGIC RHINITIS DUE TO ANIMAL DANDER: ICD-10-CM

## 2025-01-08 DIAGNOSIS — J30.89 ALLERGIC RHINITIS DUE TO MOLD: ICD-10-CM

## 2025-01-08 PROCEDURE — 95117 IMMUNOTHERAPY INJECTIONS: CPT | Performed by: ALLERGY & IMMUNOLOGY

## 2025-01-08 PROCEDURE — INJT2 ALLERGY IMMUNOTHERAPY 2+ INJECTIONS: Performed by: ALLERGY & IMMUNOLOGY

## 2025-01-09 ENCOUNTER — OFFICE VISIT (OUTPATIENT)
Dept: HEMATOLOGY/ONCOLOGY | Facility: HOSPITAL | Age: 40
End: 2025-01-09
Payer: COMMERCIAL

## 2025-01-09 ENCOUNTER — LAB (OUTPATIENT)
Dept: LAB | Facility: HOSPITAL | Age: 40
End: 2025-01-09
Payer: COMMERCIAL

## 2025-01-09 VITALS
DIASTOLIC BLOOD PRESSURE: 80 MMHG | WEIGHT: 171.3 LBS | TEMPERATURE: 98.2 F | SYSTOLIC BLOOD PRESSURE: 131 MMHG | HEART RATE: 99 BPM | OXYGEN SATURATION: 100 % | BODY MASS INDEX: 27.66 KG/M2 | RESPIRATION RATE: 18 BRPM

## 2025-01-09 DIAGNOSIS — D47.3 ESSENTIAL THROMBOCYTHEMIA (MULTI): ICD-10-CM

## 2025-01-09 DIAGNOSIS — R00.0 TACHYCARDIA: Primary | ICD-10-CM

## 2025-01-09 DIAGNOSIS — R00.2 PALPITATIONS: ICD-10-CM

## 2025-01-09 LAB
ALBUMIN SERPL BCP-MCNC: 4.8 G/DL (ref 3.4–5)
ALP SERPL-CCNC: 97 U/L (ref 33–120)
ALT SERPL W P-5'-P-CCNC: 36 U/L (ref 7–52)
ANION GAP SERPL CALC-SCNC: 11 MMOL/L (ref 10–20)
AST SERPL W P-5'-P-CCNC: 26 U/L (ref 9–39)
BASOPHILS # BLD AUTO: 0.04 X10*3/UL (ref 0–0.1)
BASOPHILS NFR BLD AUTO: 0.8 %
BILIRUB SERPL-MCNC: 0.7 MG/DL (ref 0–1.2)
BUN SERPL-MCNC: 10 MG/DL (ref 6–23)
CALCIUM SERPL-MCNC: 9.8 MG/DL (ref 8.6–10.3)
CHLORIDE SERPL-SCNC: 103 MMOL/L (ref 98–107)
CO2 SERPL-SCNC: 27 MMOL/L (ref 21–32)
CREAT SERPL-MCNC: 1.04 MG/DL (ref 0.5–1.3)
EGFRCR SERPLBLD CKD-EPI 2021: 70 ML/MIN/1.73M*2
EOSINOPHIL # BLD AUTO: 0.03 X10*3/UL (ref 0–0.7)
EOSINOPHIL NFR BLD AUTO: 0.6 %
ERYTHROCYTE [DISTWIDTH] IN BLOOD BY AUTOMATED COUNT: 11.7 % (ref 11.5–14.5)
FERRITIN SERPL-MCNC: 566 NG/ML (ref 8–300)
FOLATE SERPL-MCNC: 8.7 NG/ML
GLUCOSE SERPL-MCNC: 101 MG/DL (ref 74–99)
HCT VFR BLD AUTO: 37.5 % (ref 36–52)
HGB BLD-MCNC: 13.3 G/DL (ref 12–17.5)
IMM GRANULOCYTES # BLD AUTO: 0.02 X10*3/UL (ref 0–0.7)
IMM GRANULOCYTES NFR BLD AUTO: 0.4 % (ref 0–0.9)
IRON SATN MFR SERPL: 52 % (ref 25–45)
IRON SERPL-MCNC: 150 UG/DL (ref 35–150)
LYMPHOCYTES # BLD AUTO: 2.14 X10*3/UL (ref 1.2–4.8)
LYMPHOCYTES NFR BLD AUTO: 41.5 %
MCH RBC QN AUTO: 37.7 PG (ref 26–34)
MCHC RBC AUTO-ENTMCNC: 35.5 G/DL (ref 32–36)
MCV RBC AUTO: 106 FL (ref 80–100)
MONOCYTES # BLD AUTO: 0.34 X10*3/UL (ref 0.1–1)
MONOCYTES NFR BLD AUTO: 6.6 %
NEUTROPHILS # BLD AUTO: 2.59 X10*3/UL (ref 1.2–7.7)
NEUTROPHILS NFR BLD AUTO: 50.1 %
NRBC BLD-RTO: 0 /100 WBCS (ref 0–0)
PLATELET # BLD AUTO: 481 X10*3/UL (ref 150–450)
POTASSIUM SERPL-SCNC: 4 MMOL/L (ref 3.5–5.3)
PROT SERPL-MCNC: 8 G/DL (ref 6.4–8.2)
RBC # BLD AUTO: 3.53 X10*6/UL (ref 4–5.9)
SODIUM SERPL-SCNC: 137 MMOL/L (ref 136–145)
TIBC SERPL-MCNC: 286 UG/DL (ref 240–445)
UIBC SERPL-MCNC: 136 UG/DL (ref 110–370)
VIT B12 SERPL-MCNC: 657 PG/ML (ref 211–911)
WBC # BLD AUTO: 5.2 X10*3/UL (ref 4.4–11.3)

## 2025-01-09 PROCEDURE — 82607 VITAMIN B-12: CPT

## 2025-01-09 PROCEDURE — 82746 ASSAY OF FOLIC ACID SERUM: CPT

## 2025-01-09 PROCEDURE — 80053 COMPREHEN METABOLIC PANEL: CPT

## 2025-01-09 PROCEDURE — 82728 ASSAY OF FERRITIN: CPT

## 2025-01-09 PROCEDURE — 36415 COLL VENOUS BLD VENIPUNCTURE: CPT

## 2025-01-09 PROCEDURE — 99214 OFFICE O/P EST MOD 30 MIN: CPT | Performed by: PHYSICIAN ASSISTANT

## 2025-01-09 PROCEDURE — 85025 COMPLETE CBC W/AUTO DIFF WBC: CPT

## 2025-01-09 PROCEDURE — 83540 ASSAY OF IRON: CPT

## 2025-01-09 RX ORDER — HYDROXYUREA 500 MG/1
1000 CAPSULE ORAL DAILY
Qty: 180 CAPSULE | Refills: 1 | Status: SHIPPED | OUTPATIENT
Start: 2025-01-09

## 2025-01-09 ASSESSMENT — PAIN SCALES - GENERAL: PAINLEVEL_OUTOF10: 0-NO PAIN

## 2025-01-09 NOTE — PROGRESS NOTES
"Patient ID: Lillian Manriquez \"Veena\" is a 39 y.o. adult.  Referring Physician: Laron Calvin, PARashidaC  [address] [place] [address]  Primary Care Provider: Emmy Alcantara MD  Visit Type: Follow Up    Location: [facility] - Main  Diagnosis/Reason: JAK2+ ET    Interval Hx:  1/9/25  Lillian Manriquez is a 39 y.o. adult following up today for JAK2+ ET    NOTE:  Patient assigned female at birth and is transgender non-binary who prefers \"they/them/their\" as pronouns  5/30/24 - Patient was previously followed by TRAVON Peng but is now transferring care to me effective today. Their last visit was 12/14/23 and it was noted that patient has had compliance issues w/ daily adherence to Hydrea therapy (last noted 10/1-10/15/23 during patient's trip to [facility]). As of 12/14/23 it was noted that patient reported she had been taking Hydrea daily without adverse effect or reaction. The dose had been decreased from 1000mg to 500mg approximately 2 week prior to that visit (12/14/23)    Today she c/o 3 episodes of epistaxis in December (2 in December and 1 in January), hot sensation    Patient denies weight loss, abnormal bruising and bleeding, hematuria, blood in stool, dark/black stools, epistaxis, oral/gingival bleeding, lymphadenopathy, recurrent infections, recurrent fevers, night sweats, early satiety, abdominal pain, bone pain, chest pain, palpitations, SOB, ANSARI, fatigue, dizziness, lightheadedness, PICA.    Relevant Hx:  12/14/23 - Last Visit w/ H. TRAVON Rodriguez  Location:  Main [facility]  Dx: JAK2+ ET  Tx: hydrea, asa     Lillian is a 36 y/o female assigned at birth (transgender non-binary who prefers \"they/them/their\" pronouns) presenting for follow-up of JAK2+ ET.      Presents 11/30/23 for followup. Lost to followup since last appt 5/2023.     Reports healing issues last 8mo from gender reassignment surgery - reports is leaking pus - is going to meet with a new surgeon and possibly get reverse surgery to " fix the issues  Oct 1st-15th - went to [place] and was unable to take Hydrea then as it was confiscated and thrown out per pt. Took ASA while in [place] (only had 500mg capsules available there)  Taking Hydrea daily w/o issue - did not miss any other doses.  Has been taking Hydea 2 caps daily and ASA daily. SE hyperpigmentation. Reports chronic burning of feet and hands bilaterally had resolved overall - on gabapentin 300mg at bedtime  w/o issue but pain is back since out of gabapentin for the last 2mo.    DVT prophyllaxis periop and for 14 days post op with lovenox 40mg daily sc   Plan for surgery 1/31 at [facility] - liposuction and scar revision on breasts     12/14/23 - Here for followup appt after decreasing Hydrea from 1,000mg daily to 500mg daily 2 weeks ago -- no issues with change. Restarted Gabapentin and has not noticed much improvement in burning yet. To get surgery tomorrow now at [facility]. Had site of infection cultured and is to start antibx after surgery. Is going to transfer all care to [facility] - will see hematologist Dr. MAC Correa Dec 21st      The patient has not noted fever, chills, drenching night sweats, weight loss, bone pain, hemorrhage from any site, melena, or respiratory symptoms. No new issues.      No personal history of stroke, MI, or clot.     Completed following surgeries:  hysterectomy (Dr. Galina Mccann) and mastectomy at [facility] with Dr. River Lackey, and SRS (sexual reassignment sx) - stage 1 ant abdominal phalloplasty 3/2022; stage 2a phalloplasty 9/2022; staged urethroplasty with buccal mucosal graft and preputial graft,  clitoral transposition, L clitoral nerve neurolysis and avance nerve grafting,  clarix interposition, complex wound closure w/ tubularization of neophallus with Dr. Mario Montes on 5/10/23. Had recent emergency surgery- s/p laparoscopic R oophorectomy on 12/10/22 for ovarian torsion.    PMHx:  Active Ambulatory Problems     Diagnosis Date Noted    Abnormal weight gain 02/15/2023     Acute pain 02/15/2023    Bipolar 1 disorder (Multi) 02/15/2023    Gender dysphoria 02/15/2023    Migraine with aura 08/11/2010    Seasonal allergic rhinitis due to pollen 02/15/2023    Trauma and stressor-related disorder 02/15/2023    Torsion of ovary, ovarian pedicle, or fallopian tube 02/15/2023    Essential thrombocythemia (Multi) 03/29/2023    Hypertriglyceridemia without hypercholesterolemia 04/28/2023    Hirsutism 04/28/2023    SCC (squamous cell carcinoma) 08/19/2023    Oral candida 08/19/2023    Pain in joint, ankle and foot 07/29/2011    Panic attacks 08/12/2014    Paranoia (psychosis) (Multi) 08/11/2010    Patent foramen ovale ([facility]) 09/20/2010    Wound infection 08/19/2023    Depression 08/11/2010    MVP (mitral valve prolapse) 09/20/2010    Mild intermittent asthma without complication ([facility]) 08/19/2023    GERD (gastroesophageal reflux disease) 06/27/2011    Flank pain 01/03/2013    Dyslexia 08/11/2010    Calculus of ureter 01/03/2013    Auditory hallucinations 09/20/2010    Anxiety 08/11/2010    Acquired equinus foot deformity 08/11/2010    Acne 11/29/2010    Abnormality of gait 09/17/2010    Insomnia 12/01/2023    Allergic rhinitis due to mold 04/15/2024    Allergic rhinitis due to animal dander 04/15/2024    Neuropathic pain syndrome (non-herpetic) 06/13/2024    Neuropathy 06/13/2024     Resolved Ambulatory Problems     Diagnosis Date Noted    Acute bronchitis 02/15/2023    Acute diarrhea 02/15/2023    Acute lower urinary tract infection 02/15/2023    Dyspareunia, female 02/15/2023    Essential thrombocytosis 02/15/2023    Heavy menses 02/15/2023    Vaginal pain 02/15/2023     Past Medical History:   Diagnosis Date    Acute upper respiratory infection, unspecified 02/24/2020    Encounter for examination of ears and hearing without abnormal findings 10/12/2015    Flat foot (pes planus) (acquired), right foot 09/19/2017    Personal history of other (healed) physical injury and trauma 08/29/2018  "   Personal history of other diseases of the digestive system     Personal history of other diseases of the digestive system 12/18/2019    Personal history of other diseases of the female genital tract 10/25/2016    Personal history of other diseases of the respiratory system 02/24/2020    Personal history of other diseases of the respiratory system 08/04/2020    Personal history of other mental and behavioral disorders     Personal history of other specified conditions 07/19/2020     PSHx:  Past Surgical History:   Procedure Laterality Date    OTHER SURGICAL HISTORY  01/16/2019    Leg surgery      Hysterectomy (Dr. Galina Mccann)  Mastectomy at [facility] with Dr. River Lackey,   [facility] (sexual reassignment sx)  Stage 1 ant abdominal phalloplasty 3/2022;   Stage 2a phalloplasty 9/2022;   Staged urethroplasty with buccal mucosal graft and preputial graft, clitoral transposition, L clitoral nerve neurolysis and avance nerve grafting,  Clarix interposition, complex wound closure w/ tubularization of neophallus with Dr. Mario Montes on 5/10/23.   S/p laparoscopic R oophorectomy on 12/10/22 for ovarian torsion    FHx:  Family History   Problem Relation Name Age of Onset    Diabetes Mother      Heart failure Mother      Hypertension Mother      Schizophrenia Mother      COPD Father          Social Hx:  Lillian Manriquez \"Veena\"    reports that Veena has never smoked. Veena has never used smokeless tobacco.  Veena  reports that Veena does not currently use alcohol.  Veena  reports no history of drug use.  Social History     Socioeconomic History    Marital status: Single   Tobacco Use    Smoking status: Never    Smokeless tobacco: Never   Substance and Sexual Activity    Alcohol use: Not Currently    Drug use: Never     Social Drivers of Health     Food Insecurity: Food Insecurity Present (9/20/2024)    Hunger Vital Sign     Worried About Running Out of Food in the Last Year: Sometimes true     Ran Out of Food in the Last Year: " Sometimes true     Medications and allergies reviewed in EMR.    ROS:  Review of Systems - Oncology   10 point review of systems negative except as state in HPI.    Vitals & Statistics:  Objective   BSA: 1.9 meters squared  /80   Pulse 99   Temp 36.8 °C (98.2 °F) (Skin)   Resp 18   Wt 77.7 kg (171 lb 4.8 oz)   LMP  (LMP Unknown)   SpO2 100%   BMI 27.66 kg/m²     Physical Exam:  Physical Exam  Vitals and nursing note reviewed.   Constitutional:       Appearance: Normal appearance. Veena is normal weight.   HENT:      Head: Normocephalic and atraumatic.      Right Ear: External ear normal.      Left Ear: External ear normal.      Nose: Nose normal.      Mouth/Throat:      Mouth: Mucous membranes are moist.      Pharynx: Oropharynx is clear.   Eyes:      Extraocular Movements: Extraocular movements intact.      Conjunctiva/sclera: Conjunctivae normal.      Pupils: Pupils are equal, round, and reactive to light.   Cardiovascular:      Rate and Rhythm: Normal rate and regular rhythm.      Pulses: Normal pulses.      Heart sounds: Normal heart sounds.   Pulmonary:      Effort: Pulmonary effort is normal.      Breath sounds: Normal breath sounds.   Abdominal:      General: Abdomen is flat. Bowel sounds are normal.      Palpations: Abdomen is soft.      Comments: No masses or organomegaly palpable during exam   Musculoskeletal:         General: Normal range of motion.      Cervical back: Normal range of motion.   Lymphadenopathy:      Comments: No lymphadenopathy palpable   Skin:     General: Skin is warm and dry.      Capillary Refill: Capillary refill takes less than 2 seconds.   Neurological:      Mental Status: Veena is alert and oriented to person, place, and time. Mental status is at baseline.   Psychiatric:         Mood and Affect: Mood normal.         Behavior: Behavior normal.         Thought Content: Thought content normal.         Judgment: Judgment normal.     Results:  Lab Results   Component Value  "Date    WBC 4.0 (L) 08/29/2024    NEUTROABS 2.03 08/29/2024    IGABSOL 0.02 08/29/2024    LYMPHSABS 1.71 08/29/2024    MONOSABS 0.20 08/29/2024    EOSABS 0.02 08/29/2024    BASOSABS 0.02 08/29/2024    RBC 3.22 (L) 08/29/2024     (H) 08/29/2024    MCHC 34.9 08/29/2024    HGB 12.4 08/29/2024    HCT 35.5 (L) 08/29/2024     08/29/2024     Lab Results   Component Value Date    RETICCTPCT 3.3 (H) 07/09/2021      Lab Results   Component Value Date    CREATININE 1.03 08/29/2024    BUN 12 08/29/2024    EGFR 71 08/29/2024     08/29/2024    K 3.9 08/29/2024     (H) 08/29/2024    CO2 21 08/29/2024      Lab Results   Component Value Date    ALT 62 (H) 08/29/2024    AST 26 08/29/2024    ALKPHOS 106 08/29/2024    BILITOT 0.5 08/29/2024      Lab Results   Component Value Date    TSH 1.11 08/14/2024     Lab Results   Component Value Date    TSH 1.11 08/14/2024     Lab Results   Component Value Date    IRON 79 08/29/2024    TIBC 282 08/29/2024    FERRITIN 497 (H) 08/29/2024      Lab Results   Component Value Date    LPKQKQEK95 925 (H) 08/29/2024      Lab Results   Component Value Date    FOLATE 12.4 08/29/2024     Lab Results   Component Value Date    SEDRATE 14 01/09/2021      Lab Results   Component Value Date    CRP 0.51 01/09/2021      No results found for: \"GINI\"  Lab Results   Component Value Date     11/09/2017     No results found for: \"HAPTOGLOBIN\"  No results found for: \"SPEP\"  No results found for: \"IGG\", \"IGM\", \"IGA\"  No results found for: \"HEPATOT\", \"HEPAIGM\", \"HEPBCIGM\", \"HEPBCAB\", \"HEPBSAG\", \"HEPCAB\"  No results found for: \"HIV1X2\"    Assessment:  Lillian Manriquez is a 39 y.o. adult following up today for JAK2+ ET    Today she c/o generalized intermittent pruritus. She denies changes in lotion, soaps, detergents. She reports she is following w/ dermatology and reports she will be having an allergy test coming up and skin biopsy with that provider.    Physical exam unremarkable    I " reviewed patient's chart including but not limited to labs, imaging, surgical/procedure notes, pathology, hospital notes, doctor's notes.    1/9/25 results:  WBC count & differential WNL  Macrocytic, hypochromic erythrocytopenia at 3.53 - H&H WNL - - Likely 2/2 Hydrea therapy  Thrombocytosis at 481K    Plan:  JAK2+ ET  Lab results pending - Will review when available and address adverse results as needed  Continue Hydrea 500mg 2 capsules PO QD - Refills submitted  RTC in 4 months via in-person visit - F/U sooner if needed/urgent  CBC-D, CMP, Iron studies, B12, Folate up to 1 week  Patient to transfer care to another [facility] hematology provider for this visit as I am transferring away from [facility] to [facility] effective 4/2025    I had an extensive discussion with the patient regarding the diagnosis and discussed the plan of therapy, including general considerations regarding side effects and outcomes. Pt understood and gave appropriate teach back about the plan of care. All questions were answered to the patient's satisfaction. The patient is instructed to contact us at any time if questions or problems arise. Thank you for the opportunity to participate in the care of this very pleasant patient.    Total time = 30 minutes. 50% or more of this time was spent in counseling and/or coordination of care including reviewing medical history/radiology/labs, examining patient, formulating outlined plan with team, and discussing plan with patient/family.    Laron Calvin PA-C

## 2025-01-14 ENCOUNTER — TELEPHONE (OUTPATIENT)
Dept: HEMATOLOGY/ONCOLOGY | Facility: CLINIC | Age: 40
End: 2025-01-14
Payer: COMMERCIAL

## 2025-01-14 DIAGNOSIS — D47.3 ESSENTIAL THROMBOCYTHEMIA (MULTI): Primary | ICD-10-CM

## 2025-01-14 DIAGNOSIS — R79.89 ELEVATED FERRITIN: ICD-10-CM

## 2025-01-14 NOTE — TELEPHONE ENCOUNTER
"Veena is very concerned about elevated ferritin level on 1/9/25. They have been feeling poorly and wonder if this is why. Fatigue, skin issues and flushing. They report missing some hydrea doses. They wonder if they should have \"blood removed\" in order to lower the level. RN will follow up with patient after reporting concerns to provider.    "

## 2025-01-14 NOTE — TELEPHONE ENCOUNTER
Per Laron Calvin order patient to have labs redrawn next week to confirm elevated ferritin. Patient states they will have labs drawn 1/22/25. RN will follow up on 1/23/25 with results.

## 2025-01-16 ENCOUNTER — APPOINTMENT (OUTPATIENT)
Dept: BEHAVIORAL HEALTH | Facility: CLINIC | Age: 40
End: 2025-01-16
Payer: COMMERCIAL

## 2025-01-16 DIAGNOSIS — F31.9 BIPOLAR 1 DISORDER (MULTI): ICD-10-CM

## 2025-01-16 DIAGNOSIS — F43.9 TRAUMA AND STRESSOR-RELATED DISORDER: ICD-10-CM

## 2025-01-16 PROCEDURE — G2211 COMPLEX E/M VISIT ADD ON: HCPCS | Performed by: PSYCHIATRY & NEUROLOGY

## 2025-01-16 PROCEDURE — 99214 OFFICE O/P EST MOD 30 MIN: CPT | Performed by: PSYCHIATRY & NEUROLOGY

## 2025-01-16 ASSESSMENT — PATIENT HEALTH QUESTIONNAIRE - PHQ9
SUM OF ALL RESPONSES TO PHQ9 QUESTIONS 1 AND 2: 2
2. FEELING DOWN, DEPRESSED OR HOPELESS: SEVERAL DAYS
10. IF YOU CHECKED OFF ANY PROBLEMS, HOW DIFFICULT HAVE THESE PROBLEMS MADE IT FOR YOU TO DO YOUR WORK, TAKE CARE OF THINGS AT HOME, OR GET ALONG WITH OTHER PEOPLE: SOMEWHAT DIFFICULT
1. LITTLE INTEREST OR PLEASURE IN DOING THINGS: SEVERAL DAYS

## 2025-01-16 NOTE — PROGRESS NOTES
"Adult Ambulatory Psychiatry Progress Note    Pt is at home ([address] [id number] )  Writer is at home office    Virtual or Telephone Consent    An interactive audio and video telecommunication system which permits real time communications between the patient (at the originating site) and provider (at the distant site) was utilized to provide this telehealth service.   Verbal consent was requested and obtained from Lillian Manriquez on this date, 01/16/25 for a telehealth visit.      Assessment/Plan     Impression:  Lillian Manriquez \"Veena\" is a 39 y.o. 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       Plan:      Bipolar d/o - lamotrigine 150mg daily, bupropion SR 150mg daily  Trauma/anxiety - buspar 5mg BID, duloxetine 20mg daily, hydroxyzine 10mg TID prn panic, stop prazosin 1-3mg at bedtime, topiramate 100mg daily, c/w med ed, psycho ed, supportive psychotherapy to build therapeutic rapport, f/u 3 months    Subjective     Chief Complaint: Bipolar and Anxiety    HPI:  Pt arrived on time. Mood \"better\" since last session. They've had more pain which is causing irritability. They have occasional depression. She has hyperferritinemia which may be responsible. Sleep is ok but they're feeling tired. They're waking frequently. Denies nightmares. Pt wants to try stopping prazosin. Reviewed SE of hyperferritinemia including fatigue. They're working with [employer] and applied to [employer] and [employer].           Objective   Mental Status Exam:  General Appearance: Well groomed, appropriate eye contact  Attitude/Behavior: Cooperative  Motor: No psychomotor agitation or retardation, no tremor or other abnormal movements  Speech: Normal rate, volume, prosody  Mood: \"better\"  Affect: Constricted  Thought Process: Linear, goal directed  Thought Associations: No loosening of associations  Thought Content: Normal  Perception: No perceptual " abnormalities noted  Insight: Intact  Judgement: Intact    Vitals:  There were no vitals filed for this visit.    Current Medications:  Current Outpatient Medications on File Prior to Visit   Medication Sig Dispense Refill    acetaminophen (Tylenol) 500 mg tablet Take 1-2 tablets (500-1,000 mg) by mouth every 6 hours if needed for mild pain (1 - 3).      albuterol 90 mcg/actuation inhaler Inhale 2 puffs see administration instructions. EVERY 4-6 HOURS AS NEEDED. 18 g 3    buPROPion SR (Wellbutrin SR) 150 mg 12 hr tablet Take 1 tablet (150 mg) by mouth once daily. Do not crush, chew, or split. 30 tablet 5    busPIRone (Buspar) 5 mg tablet Take 1 tablet (5 mg) by mouth 2 times a day. 60 tablet 5    cetirizine (ZyrTEC) 10 mg tablet Take 1 tablet (10 mg) by mouth once daily.      cholecalciferol (Vitamin D-3) 25 MCG (1000 UT) tablet Take 2 tablets (2,000 Units) by mouth once daily.      clobetasol (Temovate) 0.05 % ointment Apply 1 Application topically if needed. APPLY TO MOSQUITO BITES AS NEEDED      docusate sodium (Colace) 100 mg capsule Take 1 capsule (100 mg) by mouth twice a day.      DULoxetine (Cymbalta) 20 mg DR capsule Take 1 capsule (20 mg) by mouth once daily. Do not crush or chew. 30 capsule 5    dupilumab (Dupixent) 300 mg/2 mL pen injector Inject 2 mL (300 mg) under the skin every 14 (fourteen) days.      enoxaparin (Lovenox) 40 mg/0.4 mL syringe       fexofenadine (Allegra) 180 mg tablet Take 1 tablet (180 mg) by mouth once daily.      fluticasone (Flonase) 50 mcg/actuation nasal spray Administer 2 sprays into each nostril once daily. 16 g 3    hydrocortisone 2.5 % cream Apply topically once daily. 30 g 5    hydroxyurea (Hydrea) 500 mg capsule Take 2 capsules (1,000 mg total) by mouth once daily.  Take at the same time each day. 180 capsule 1    hydrOXYzine HCL (Atarax) 10 mg tablet Take 1 tablet (10 mg) by mouth 3 times a day as needed for anxiety. 90 tablet 0    inhaler, assist devices (E-Z SPACER  MISC) 1 Units see administration instructions. USE WITH INHALER AS DIRECTED      lamoTRIgine (LaMICtal) 150 mg tablet Take 1 tablet (150 mg) by mouth once daily. 30 tablet 5    levocetirizine (Xyzal) 5 mg tablet Take 1 tablet (5 mg) by mouth once daily. 30 tablet 11    methocarbamol (Robaxin) 750 mg tablet TAKE 1 TABLET BY MOUTH EVERY 6 HOURS AS NEEDED FOR 3 DAYS      topiramate (Topamax) 50 mg tablet Take 2 tablets (100 mg) by mouth once daily at bedtime. 60 tablet 5    triamcinolone (Kenalog) 0.1 % ointment Apply 1 Application topically if needed (APPLY ONE APPLICATION TOPICALLY ON ARMS/LEGS DAILY AS NEEDED FOR DRY AREAS AND FOR DARK BUMPS).      [DISCONTINUED] lidocaine (Lidoderm) 5 % patch Place 1 patch over 12 hours on the skin once daily. Apply to painful area 12 hours per day, remove for 12 hours. (Patient not taking: Reported on 9/19/2024) 30 patch 1    [DISCONTINUED] prazosin (Minipress) 1 mg capsule Take 1-3 capsules (1-3 mg) by mouth once daily at bedtime. 90 capsule 2     Current Facility-Administered Medications on File Prior to Visit   Medication Dose Route Frequency Provider Last Rate Last Admin    ALLERGEN REFILL VIAL 1 & 2   subcutaneous PRN Princess TANIA Morales MD        ALLERGEN REFILL VIAL 1 & 2   subcutaneous PRJULIANN Morales MD           Lab Review:   Lab on 01/09/2025   Component Date Value    WBC 01/09/2025 5.2     nRBC 01/09/2025 0.0     RBC 01/09/2025 3.53 (L)     Hemoglobin 01/09/2025 13.3     Hematocrit 01/09/2025 37.5     MCV 01/09/2025 106 (H)     MCH 01/09/2025 37.7 (H)     MCHC 01/09/2025 35.5     RDW 01/09/2025 11.7     Platelets 01/09/2025 481 (H)     Neutrophils % 01/09/2025 50.1     Immature Granulocytes %,* 01/09/2025 0.4     Lymphocytes % 01/09/2025 41.5     Monocytes % 01/09/2025 6.6     Eosinophils % 01/09/2025 0.6     Basophils % 01/09/2025 0.8     Neutrophils Absolute 01/09/2025 2.59     Immature Granulocytes Ab* 01/09/2025 0.02     Lymphocytes Absolute 01/09/2025 2.14      Monocytes Absolute 01/09/2025 0.34     Eosinophils Absolute 01/09/2025 0.03     Basophils Absolute 01/09/2025 0.04     Glucose 01/09/2025 101 (H)     Sodium 01/09/2025 137     Potassium 01/09/2025 4.0     Chloride 01/09/2025 103     Bicarbonate 01/09/2025 27     Anion Gap 01/09/2025 11     Urea Nitrogen 01/09/2025 10     Creatinine 01/09/2025 1.04     eGFR 01/09/2025 70     Calcium 01/09/2025 9.8     Albumin 01/09/2025 4.8     Alkaline Phosphatase 01/09/2025 97     Total Protein 01/09/2025 8.0     AST 01/09/2025 26     Bilirubin, Total 01/09/2025 0.7     ALT 01/09/2025 36     Ferritin 01/09/2025 566 (H)     Folate, Serum 01/09/2025 8.7     Iron 01/09/2025 150     UIBC 01/09/2025 136     TIBC 01/09/2025 286     % Saturation 01/09/2025 52 (H)     Vitamin B12 01/09/2025 657        Orders:  Diagnoses and all orders for this visit:  Bipolar 1 disorder (Multi)  -     Follow Up In Psychiatry  -     Follow Up In Psychiatry; Future  Trauma and stressor-related disorder      Risk Assessment:  Risk of harm to self: Medium Risk - Risk factors include: {Depression, History of impulsivity and/or aggressive behavior , History of trauma or abuse , Loss (relational, social, occupational, financial) , and Medical illness comorbidity , Protective Factors include: Denies current suicidal ideation, Denies history of suicide attempts , Future-oriented talk , Willingness to seek help and support , Age, Access to a variety of clinical interventions , Receiving and engaged in care for mental, physical, and substance use disorders , Support through ongoing medical and mental healthcare relationships , and Current/history of good response to treatment/meds     Risk of harm to others: Low Risk - Risk factors include: No significant risk factors identified on screening. Protective factors include: Lack of known history of harm to others , Lack of known history of violent ideation , Lack of known access to firearms , Sense of community,  availability/access to resources and support , Sense of optimism, hope , Interpersonal competence , Affect regulation , Sense of self-efficacy, internal locus of control , and Positive, pro-social family/peer network         Time Spent:    Prep time: 2 min  Direct patient time: 23 min  Documentation time: 5 min  Total time: 30 min    Next Appointment:  Follow up in 13 weeks (on 4/17/2025).

## 2025-01-20 ENCOUNTER — LAB (OUTPATIENT)
Dept: LAB | Facility: HOSPITAL | Age: 40
End: 2025-01-20
Payer: COMMERCIAL

## 2025-01-20 ENCOUNTER — APPOINTMENT (OUTPATIENT)
Dept: ALLERGY | Facility: HOSPITAL | Age: 40
End: 2025-01-20
Payer: COMMERCIAL

## 2025-01-20 ENCOUNTER — CLINICAL SUPPORT (OUTPATIENT)
Dept: ALLERGY | Facility: HOSPITAL | Age: 40
End: 2025-01-20
Payer: COMMERCIAL

## 2025-01-20 VITALS
OXYGEN SATURATION: 100 % | DIASTOLIC BLOOD PRESSURE: 84 MMHG | TEMPERATURE: 97.7 F | SYSTOLIC BLOOD PRESSURE: 126 MMHG | RESPIRATION RATE: 18 BRPM

## 2025-01-20 DIAGNOSIS — J30.81 ALLERGIC RHINITIS DUE TO ANIMAL DANDER: ICD-10-CM

## 2025-01-20 DIAGNOSIS — J30.89 ALLERGIC RHINITIS DUE TO MOLD: ICD-10-CM

## 2025-01-20 DIAGNOSIS — R79.89 ELEVATED FERRITIN: ICD-10-CM

## 2025-01-20 DIAGNOSIS — D47.3 ESSENTIAL THROMBOCYTHEMIA (MULTI): ICD-10-CM

## 2025-01-20 LAB
BASOPHILS # BLD AUTO: 0.02 X10*3/UL (ref 0–0.1)
BASOPHILS NFR BLD AUTO: 0.5 %
DAT-POLYSPECIFIC: NORMAL
EOSINOPHIL # BLD AUTO: 0.04 X10*3/UL (ref 0–0.7)
EOSINOPHIL NFR BLD AUTO: 0.9 %
ERYTHROCYTE [DISTWIDTH] IN BLOOD BY AUTOMATED COUNT: 11.8 % (ref 11.5–14.5)
FERRITIN SERPL-MCNC: 511 NG/ML (ref 8–300)
HAPTOGLOB SERPL NEPH-MCNC: 71 MG/DL (ref 30–200)
HCT VFR BLD AUTO: 36.5 % (ref 36–52)
HGB BLD-MCNC: 13.2 G/DL (ref 12–17.5)
HGB RETIC QN: 41 PG (ref 28–38)
IMM GRANULOCYTES # BLD AUTO: 0.01 X10*3/UL (ref 0–0.7)
IMM GRANULOCYTES NFR BLD AUTO: 0.2 % (ref 0–0.9)
IMMATURE RETIC FRACTION: 17.5 %
IRON SATN MFR SERPL: 21 % (ref 25–45)
IRON SERPL-MCNC: 58 UG/DL (ref 35–150)
LDH SERPL L TO P-CCNC: 157 U/L (ref 84–246)
LYMPHOCYTES # BLD AUTO: 1.86 X10*3/UL (ref 1.2–4.8)
LYMPHOCYTES NFR BLD AUTO: 42.6 %
MCH RBC QN AUTO: 38 PG (ref 26–34)
MCHC RBC AUTO-ENTMCNC: 36.2 G/DL (ref 32–36)
MCV RBC AUTO: 105 FL (ref 80–100)
MONOCYTES # BLD AUTO: 0.25 X10*3/UL (ref 0.1–1)
MONOCYTES NFR BLD AUTO: 5.7 %
NEUTROPHILS # BLD AUTO: 2.19 X10*3/UL (ref 1.2–7.7)
NEUTROPHILS NFR BLD AUTO: 50.1 %
NRBC BLD-RTO: 0 /100 WBCS (ref 0–0)
PLATELET # BLD AUTO: 441 X10*3/UL (ref 150–450)
RBC # BLD AUTO: 3.47 X10*6/UL (ref 4–5.9)
RETICS #: 0.08 X10*6/UL (ref 0.02–0.12)
RETICS/RBC NFR AUTO: 2.4 % (ref 0.5–2)
TIBC SERPL-MCNC: 281 UG/DL (ref 240–445)
UIBC SERPL-MCNC: 223 UG/DL (ref 110–370)
WBC # BLD AUTO: 4.4 X10*3/UL (ref 4.4–11.3)

## 2025-01-20 PROCEDURE — 82728 ASSAY OF FERRITIN: CPT

## 2025-01-20 PROCEDURE — 36415 COLL VENOUS BLD VENIPUNCTURE: CPT

## 2025-01-20 PROCEDURE — 85045 AUTOMATED RETICULOCYTE COUNT: CPT

## 2025-01-20 PROCEDURE — 95117 IMMUNOTHERAPY INJECTIONS: CPT | Performed by: ALLERGY & IMMUNOLOGY

## 2025-01-20 PROCEDURE — 86880 COOMBS TEST DIRECT: CPT

## 2025-01-20 PROCEDURE — 83615 LACTATE (LD) (LDH) ENZYME: CPT

## 2025-01-20 PROCEDURE — 83550 IRON BINDING TEST: CPT

## 2025-01-20 PROCEDURE — 85025 COMPLETE CBC W/AUTO DIFF WBC: CPT

## 2025-01-20 PROCEDURE — 83010 ASSAY OF HAPTOGLOBIN QUANT: CPT

## 2025-01-22 ENCOUNTER — APPOINTMENT (OUTPATIENT)
Dept: PRIMARY CARE | Facility: CLINIC | Age: 40
End: 2025-01-22
Payer: COMMERCIAL

## 2025-01-22 ENCOUNTER — OFFICE VISIT (OUTPATIENT)
Dept: PRIMARY CARE | Facility: CLINIC | Age: 40
End: 2025-01-22
Payer: COMMERCIAL

## 2025-01-22 VITALS
BODY MASS INDEX: 28.99 KG/M2 | DIASTOLIC BLOOD PRESSURE: 71 MMHG | OXYGEN SATURATION: 100 % | HEIGHT: 65 IN | TEMPERATURE: 96.5 F | SYSTOLIC BLOOD PRESSURE: 119 MMHG | HEART RATE: 76 BPM | WEIGHT: 174 LBS | RESPIRATION RATE: 16 BRPM

## 2025-01-22 DIAGNOSIS — R42 DIZZINESS: ICD-10-CM

## 2025-01-22 DIAGNOSIS — R11.0 NAUSEA: ICD-10-CM

## 2025-01-22 DIAGNOSIS — R19.7 DIARRHEA, UNSPECIFIED TYPE: Primary | ICD-10-CM

## 2025-01-22 LAB
ALBUMIN SERPL BCP-MCNC: 4.2 G/DL (ref 3.4–5)
ALP SERPL-CCNC: 89 U/L (ref 33–120)
ALT SERPL W P-5'-P-CCNC: 22 U/L (ref 7–52)
ANION GAP SERPL CALC-SCNC: 11 MMOL/L (ref 10–20)
AST SERPL W P-5'-P-CCNC: 15 U/L (ref 9–39)
BILIRUB SERPL-MCNC: 0.4 MG/DL (ref 0–1.2)
BUN SERPL-MCNC: 4 MG/DL (ref 6–23)
CALCIUM SERPL-MCNC: 9.2 MG/DL (ref 8.6–10.6)
CHLORIDE SERPL-SCNC: 106 MMOL/L (ref 98–107)
CO2 SERPL-SCNC: 27 MMOL/L (ref 21–32)
CREAT SERPL-MCNC: 0.7 MG/DL (ref 0.5–1.3)
EGFRCR SERPLBLD CKD-EPI 2021: >90 ML/MIN/1.73M*2
GLIADIN PEPTIDE IGA SER IA-ACNC: 1 U/ML
GLUCOSE SERPL-MCNC: 87 MG/DL (ref 74–99)
POTASSIUM SERPL-SCNC: 3.7 MMOL/L (ref 3.5–5.3)
PROT SERPL-MCNC: 7.1 G/DL (ref 6.4–8.2)
SODIUM SERPL-SCNC: 140 MMOL/L (ref 136–145)
TSH SERPL-ACNC: 1.41 MIU/L (ref 0.44–3.98)
TTG IGA SER IA-ACNC: <1 U/ML

## 2025-01-22 PROCEDURE — 83516 IMMUNOASSAY NONANTIBODY: CPT | Performed by: FAMILY MEDICINE

## 2025-01-22 PROCEDURE — 99214 OFFICE O/P EST MOD 30 MIN: CPT | Performed by: FAMILY MEDICINE

## 2025-01-22 PROCEDURE — 3008F BODY MASS INDEX DOCD: CPT | Performed by: FAMILY MEDICINE

## 2025-01-22 PROCEDURE — 36415 COLL VENOUS BLD VENIPUNCTURE: CPT | Performed by: FAMILY MEDICINE

## 2025-01-22 PROCEDURE — 1036F TOBACCO NON-USER: CPT | Performed by: FAMILY MEDICINE

## 2025-01-22 PROCEDURE — 80053 COMPREHEN METABOLIC PANEL: CPT | Performed by: FAMILY MEDICINE

## 2025-01-22 PROCEDURE — 84443 ASSAY THYROID STIM HORMONE: CPT | Performed by: FAMILY MEDICINE

## 2025-01-22 RX ORDER — ONDANSETRON 4 MG/1
4 TABLET, ORALLY DISINTEGRATING ORAL EVERY 8 HOURS PRN
Qty: 20 TABLET | Refills: 0 | Status: SHIPPED | OUTPATIENT
Start: 2025-01-22

## 2025-01-22 RX ORDER — MECLIZINE HCL 12.5 MG 12.5 MG/1
12.5 TABLET ORAL 3 TIMES DAILY PRN
Qty: 30 TABLET | Refills: 1 | Status: SHIPPED | OUTPATIENT
Start: 2025-01-22 | End: 2026-01-22

## 2025-01-22 ASSESSMENT — ENCOUNTER SYMPTOMS
DEPRESSION: 0
LOSS OF SENSATION IN FEET: 0
OCCASIONAL FEELINGS OF UNSTEADINESS: 0

## 2025-01-22 ASSESSMENT — PATIENT HEALTH QUESTIONNAIRE - PHQ9
2. FEELING DOWN, DEPRESSED OR HOPELESS: NOT AT ALL
1. LITTLE INTEREST OR PLEASURE IN DOING THINGS: NOT AT ALL
SUM OF ALL RESPONSES TO PHQ9 QUESTIONS 1 AND 2: 0

## 2025-01-22 ASSESSMENT — PAIN SCALES - GENERAL: PAINLEVEL_OUTOF10: 0-NO PAIN

## 2025-01-22 NOTE — PROGRESS NOTES
"Subjective   Lillian Manriquez \"Veena\" is a 39 y.o. adult who presents for follow-up    \"Vertigo\"  - Started about a month ago  - Started with diarrhea (see below)  - additionally with nose bleeds (recently had sinus infection)   - Feels a jittery feeling with eyes  - No room spinning  - No syncope or presyncope   - Took meclizine, which helped     Diarrhea  - Started with stomach pain a little over a month ago   - Turned into diarrhea, initially states it was 10-20 times a day. Yesterday had 6 episodes and today none so far  - Feeling nauseous every time they eat   - Pepto-bismal helps   - Stools are light brown, very loose sometimes completely liquid. No blood in stool.   - On and off stomach cramps  - No recent travel  - Did have antibiotics with sinus infection, but these were after diarrhea started (unclear exact timing)     R arm nevus  - Itching, new nevus noted     Did not address chronic medical conditions today         Review of Systems  10 point review of systems negative except as noted in HPI.    Objective   Blood pressure 119/71, pulse 76, temperature 35.8 °C (96.5 °F), resp. rate 16, height 1.651 m (5' 5\"), weight 78.9 kg (174 lb), SpO2 100%.  Body mass index is 28.96 kg/m².  General: well appearing, no distress  Eyes: Normal conjunctiva  ENT: TMs clear bilaterally, no pharyngeal erythema or exudate. Jaiden-hallpike negative   Neck: No lymphadenopathy, no thyromegaly  CV: Regular rate and rhythm, no murmur  Lungs: Clear to auscultation bilaterally  Abdomen: Soft, nontender, nondistended  Skin- On exam, 0.25 cm lesion on R forearm, regular borders, no surrounding erythema. No scale.  Extremities: No edema noted  Psych: Appropriate mood and affect          Assessment/Plan   38 yo here for evaluation of dizziness and diarrhea    I suspect the dizziness is secondary to the GI concerns. No findings concerning for a central vertigo. Will obtain electrolytes, give meclizine PRN    Diarrhea and nausea  -  " Description sounds infectious in origin, however chronicity complicates the picture. Will obtain labs as ordered. Additionally consider stool studies if this continues  - OK for zofran PRN nausea  - Will refer to GI given the chronicity    Follow-up next month scheduled

## 2025-01-31 LAB
GLIADIN PEPTIDE IGG SER IA-ACNC: <0.56 FLU (ref 0–4.99)
TTG IGG SER IA-ACNC: <0.82 FLU (ref 0–4.99)

## 2025-02-05 ENCOUNTER — CLINICAL SUPPORT (OUTPATIENT)
Dept: ALLERGY | Facility: HOSPITAL | Age: 40
End: 2025-02-05
Payer: COMMERCIAL

## 2025-02-05 VITALS
OXYGEN SATURATION: 99 % | HEART RATE: 88 BPM | TEMPERATURE: 98.2 F | SYSTOLIC BLOOD PRESSURE: 118 MMHG | DIASTOLIC BLOOD PRESSURE: 69 MMHG

## 2025-02-05 DIAGNOSIS — J30.1 SEASONAL ALLERGIC RHINITIS DUE TO POLLEN: ICD-10-CM

## 2025-02-05 DIAGNOSIS — J30.81 ALLERGIC RHINITIS DUE TO ANIMAL DANDER: ICD-10-CM

## 2025-02-05 PROCEDURE — 95117 IMMUNOTHERAPY INJECTIONS: CPT | Performed by: ALLERGY & IMMUNOLOGY

## 2025-02-05 PROCEDURE — INJT2 ALLERGY IMMUNOTHERAPY 2+ INJECTIONS: Performed by: ALLERGY & IMMUNOLOGY

## 2025-02-12 ENCOUNTER — APPOINTMENT (OUTPATIENT)
Dept: ALLERGY | Facility: HOSPITAL | Age: 40
End: 2025-02-12
Payer: COMMERCIAL

## 2025-02-12 PROBLEM — N35.919 URETHRAL STRICTURE: Status: ACTIVE | Noted: 2025-02-12
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
## 2025-02-12 PROBLEM — M25.561 KNEE PAIN, RIGHT: Status: ACTIVE | Noted: 2022-02-17

## 2025-02-12 PROBLEM — R79.89 ELEVATED LFTS: Status: ACTIVE | Noted: 2023-12-08

## 2025-02-12 PROBLEM — N83.519 OVARIAN TORSION: Status: ACTIVE | Noted: 2025-02-12

## 2025-02-12 PROBLEM — L81.1 CHLOASMA: Status: ACTIVE | Noted: 2023-06-29

## 2025-02-12 PROBLEM — M21.40 ACQUIRED PES PLANUS: Status: ACTIVE | Noted: 2023-12-14

## 2025-02-12 PROBLEM — F64.9 GENDER INCONGRUENCE: Status: ACTIVE | Noted: 2024-03-13

## 2025-02-12 PROBLEM — D68.9 BLOOD COAGULATION DISORDER (MULTI): Status: ACTIVE | Noted: 2023-05-11

## 2025-02-12 PROBLEM — Q67.8 CHEST ASYMMETRY: Status: ACTIVE | Noted: 2023-12-16

## 2025-02-12 PROBLEM — I73.81 ERYTHROMELALGIA (CMS-HCC): Status: ACTIVE | Noted: 2025-02-12

## 2025-02-12 PROBLEM — S06.0XAA CONCUSSION INJURY OF BODY STRUCTURE: Status: ACTIVE | Noted: 2023-02-15

## 2025-02-12 PROBLEM — Z86.59 HISTORY OF PANIC ATTACKS: Status: ACTIVE | Noted: 2022-07-21

## 2025-02-12 PROBLEM — N91.2 AMENORRHEA: Status: ACTIVE | Noted: 2023-12-14

## 2025-02-12 PROBLEM — N83.511 TORSION OF RIGHT OVARY: Status: ACTIVE | Noted: 2022-12-31

## 2025-02-12 PROBLEM — R42 VERTIGO: Status: ACTIVE | Noted: 2025-02-12

## 2025-02-12 PROBLEM — Z90.721 HISTORY OF RIGHT OOPHORECTOMY: Status: ACTIVE | Noted: 2023-12-14

## 2025-02-12 PROBLEM — J30.89 ALLERGIC RHINITIS DUE TO HOUSE DUST MITE: Status: ACTIVE | Noted: 2023-12-14

## 2025-02-12 PROBLEM — Z86.69 HISTORY OF MIGRAINE: Status: ACTIVE | Noted: 2022-07-21

## 2025-02-12 PROBLEM — Z15.89 JAK2 GENE MUTATION: Status: ACTIVE | Noted: 2023-12-14

## 2025-02-12 PROBLEM — E78.5 HYPERLIPIDEMIA: Status: ACTIVE | Noted: 2023-08-16

## 2025-02-12 PROBLEM — N39.0 URINARY TRACT INFECTION: Status: ACTIVE | Noted: 2025-02-12

## 2025-02-12 PROBLEM — R16.0 ENLARGED LIVER: Status: ACTIVE | Noted: 2023-12-08

## 2025-02-12 PROBLEM — L81.0 POSTINFLAMMATORY HYPERPIGMENTATION: Status: ACTIVE | Noted: 2022-04-07

## 2025-02-12 PROBLEM — H81.10 BPPV (BENIGN PAROXYSMAL POSITIONAL VERTIGO): Status: ACTIVE | Noted: 2025-02-12

## 2025-02-12 PROBLEM — R20.2 PARESTHESIA: Status: ACTIVE | Noted: 2025-02-12

## 2025-02-12 PROBLEM — J45.909 ASTHMA: Status: ACTIVE | Noted: 2022-02-17

## 2025-02-12 PROBLEM — M79.671 INTRACTABLE RIGHT HEEL PAIN: Status: ACTIVE | Noted: 2022-02-17

## 2025-02-12 PROBLEM — F50.9 EATING DISORDER: Status: ACTIVE | Noted: 2024-02-21

## 2025-02-12 PROBLEM — M79.89 LEG SWELLING: Status: ACTIVE | Noted: 2025-02-12

## 2025-02-12 PROBLEM — R51.9 ACHING HEADACHE: Status: ACTIVE | Noted: 2021-01-13

## 2025-02-12 PROBLEM — R73.09 ELEVATED GLUCOSE: Status: ACTIVE | Noted: 2025-02-12

## 2025-02-12 PROBLEM — R21 RASH AND OTHER NONSPECIFIC SKIN ERUPTION: Status: ACTIVE | Noted: 2023-06-29

## 2025-02-12 PROBLEM — R51.9 HEADACHE: Status: ACTIVE | Noted: 2025-02-12
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
## 2025-02-12 PROBLEM — Z90.710 HISTORY OF HYSTERECTOMY: Status: ACTIVE | Noted: 2022-07-21

## 2025-02-13 ENCOUNTER — HOSPITAL ENCOUNTER (OUTPATIENT)
Dept: CARDIOLOGY | Facility: HOSPITAL | Age: 40
Discharge: HOME | End: 2025-02-13
Payer: COMMERCIAL

## 2025-02-13 ENCOUNTER — OFFICE VISIT (OUTPATIENT)
Dept: CARDIOLOGY | Facility: HOSPITAL | Age: 40
End: 2025-02-13
Payer: COMMERCIAL

## 2025-02-13 VITALS
WEIGHT: 173.8 LBS | HEART RATE: 90 BPM | BODY MASS INDEX: 27.93 KG/M2 | HEIGHT: 66 IN | OXYGEN SATURATION: 100 % | DIASTOLIC BLOOD PRESSURE: 78 MMHG | SYSTOLIC BLOOD PRESSURE: 118 MMHG

## 2025-02-13 DIAGNOSIS — R00.0 TACHYCARDIA: ICD-10-CM

## 2025-02-13 DIAGNOSIS — Q21.12 PATENT FORAMEN OVALE (HHS-HCC): ICD-10-CM

## 2025-02-13 DIAGNOSIS — I73.81 ERYTHROMELALGIA (CMS-HCC): ICD-10-CM

## 2025-02-13 DIAGNOSIS — I34.1 MVP (MITRAL VALVE PROLAPSE): ICD-10-CM

## 2025-02-13 DIAGNOSIS — R00.2 PALPITATIONS: ICD-10-CM

## 2025-02-13 DIAGNOSIS — E78.1 HYPERTRIGLYCERIDEMIA WITHOUT HYPERCHOLESTEROLEMIA: ICD-10-CM

## 2025-02-13 DIAGNOSIS — R00.2 PALPITATIONS: Primary | ICD-10-CM

## 2025-02-13 PROBLEM — E78.5 HYPERLIPIDEMIA: Status: RESOLVED | Noted: 2023-08-16 | Resolved: 2025-02-13

## 2025-02-13 LAB — BODY SURFACE AREA: 1.92 M2

## 2025-02-13 PROCEDURE — 99213 OFFICE O/P EST LOW 20 MIN: CPT | Performed by: REGISTERED NURSE

## 2025-02-13 PROCEDURE — 1036F TOBACCO NON-USER: CPT | Performed by: REGISTERED NURSE

## 2025-02-13 PROCEDURE — 3008F BODY MASS INDEX DOCD: CPT | Performed by: REGISTERED NURSE

## 2025-02-13 PROCEDURE — 93005 ELECTROCARDIOGRAM TRACING: CPT | Performed by: REGISTERED NURSE

## 2025-02-13 PROCEDURE — 93242 EXT ECG>48HR<7D RECORDING: CPT

## 2025-02-13 PROCEDURE — 99203 OFFICE O/P NEW LOW 30 MIN: CPT | Performed by: REGISTERED NURSE

## 2025-02-13 ASSESSMENT — ENCOUNTER SYMPTOMS
DEPRESSION: 1
DIARRHEA: 1
NEAR-SYNCOPE: 0
PALPITATIONS: 1
DIZZINESS: 1
SYNCOPE: 0
LOSS OF SENSATION IN FEET: 0
VERTIGO: 1
PND: 0
IRREGULAR HEARTBEAT: 1
ORTHOPNEA: 0
CLAUDICATION: 0
OCCASIONAL FEELINGS OF UNSTEADINESS: 1
DYSPNEA ON EXERTION: 0

## 2025-02-13 ASSESSMENT — PATIENT HEALTH QUESTIONNAIRE - PHQ9
SUM OF ALL RESPONSES TO PHQ9 QUESTIONS 1 AND 2: 2
1. LITTLE INTEREST OR PLEASURE IN DOING THINGS: SEVERAL DAYS
2. FEELING DOWN, DEPRESSED OR HOPELESS: SEVERAL DAYS
10. IF YOU CHECKED OFF ANY PROBLEMS, HOW DIFFICULT HAVE THESE PROBLEMS MADE IT FOR YOU TO DO YOUR WORK, TAKE CARE OF THINGS AT HOME, OR GET ALONG WITH OTHER PEOPLE: EXTREMELY DIFFICULT

## 2025-02-13 ASSESSMENT — PAIN SCALES - GENERAL: PAINLEVEL_OUTOF10: 0-NO PAIN

## 2025-02-13 NOTE — PROGRESS NOTES
"Cardiology Clinic Note    Reason for Visit: Blood Pressure Check (My blood pressure has been high and racing when it's normally 114 117. I do have high iron due to medication).  Referring Clinician: Nikunj     History of Present Illness: Lillian Manriquez \"Shakeel" is a 39 y.o. adult who presents for palpitations. PMHx remarkable for PFO, MVP, drug-induced hypertriglyceridemia, JAK2 mutation, vertigo, essential thrombocytopenia, neuropathic pain syndrome, mild asthma, bipolar, first degree family hx of ASCVD (mother,  64).   She has experienced palpitations most of her life, worse during the night and with activity. Denies shortness of breath or pre/syncope associated with symptoms.  She has limited activity because she was told to with MVP diagnosis over 10 years ago but also she has neuropathic pain symptoms in hands and feet, exacerbated with activity.    No echo on record.   Followed by multiple specialists     Past Medical History:  Veena has a past medical history of Acute upper respiratory infection, unspecified (2020), Encounter for examination of ears and hearing without abnormal findings (10/12/2015), Flat foot (pes planus) (acquired), right foot (2017), Personal history of other (healed) physical injury and trauma (2018), Personal history of other diseases of the digestive system, Personal history of other diseases of the digestive system (2019), Personal history of other diseases of the female genital tract (10/25/2016), Personal history of other diseases of the respiratory system (2020), Personal history of other diseases of the respiratory system (2020), Personal history of other mental and behavioral disorders, and Personal history of other specified conditions (2020).    Past Surgical History:  Veena has a past surgical history that includes Other surgical history (2019).    Social History:  Veena reports that Veena has never smoked. Veena has never used " smokeless tobacco. Veena reports that Veena does not currently use alcohol. Veena reports that Veena does not use drugs.    Family History:  Family History   Problem Relation Name Age of Onset    Diabetes Mother      Heart failure Mother      Hypertension Mother      Schizophrenia Mother      COPD Father         Allergies:  Lactose; Cat dander; Codeine; Erythromycin; Dog dander; House dust mite; Hydrocodone-acetaminophen; Mold; and Tree pollen-mulberry, red    Outpatient Medications:  Current Outpatient Medications   Medication Instructions    acetaminophen (Tylenol) 500 mg tablet 1-2 tablets, Every 6 hours PRN    albuterol 90 mcg/actuation inhaler 2 puffs, inhalation, See admin instructions, EVERY 4-6 HOURS AS NEEDED.    buPROPion SR (WELLBUTRIN SR) 150 mg, oral, Daily, Do not crush, chew, or split.    busPIRone (BUSPAR) 5 mg, oral, 2 times daily    cetirizine (ZyrTEC) 10 mg tablet 1 tablet, Daily RT    cholecalciferol (VITAMIN D-3) 2,000 Units, Daily RT    clobetasol (Temovate) 0.05 % ointment 1 Application, As needed    docusate sodium (COLACE) 100 mg, 2 times daily    DULoxetine (CYMBALTA) 20 mg, oral, Daily, Do not crush or chew.    dupilumab (DUPIXENT) 300 mg, Every 14 days    enoxaparin (Lovenox) 40 mg/0.4 mL syringe     fexofenadine (Allegra) 180 mg tablet 1 tablet, Daily    fluticasone (Flonase) 50 mcg/actuation nasal spray 2 sprays, Each Nostril, Daily RT    hydrocortisone 2.5 % cream Topical, Daily    hydroxyurea (HYDREA) 1,000 mg, oral, Daily, Take at the same time each day.    hydrOXYzine HCL (ATARAX) 10 mg, oral, 3 times daily PRN    inhaler, assist devices (E-Z SPACER MISC) 1 Units, See admin instructions    lamoTRIgine (LAMICTAL) 150 mg, oral, Daily    levocetirizine (XYZAL) 5 mg, oral, Daily    meclizine (ANTIVERT) 12.5 mg, oral, 3 times daily PRN    methocarbamol (Robaxin) 750 mg tablet TAKE 1 TABLET BY MOUTH EVERY 6 HOURS AS NEEDED FOR 3 DAYS    ondansetron ODT (ZOFRAN-ODT) 4 mg, oral, Every 8  "hours PRN    topiramate (TOPAMAX) 100 mg, oral, Nightly    triamcinolone (Kenalog) 0.1 % ointment 1 Application, As needed       Review of Systems:  Review of Systems   Constitutional: Positive for malaise/fatigue.   Cardiovascular:  Positive for irregular heartbeat and palpitations. Negative for chest pain, claudication, cyanosis, dyspnea on exertion, leg swelling, near-syncope, orthopnea, paroxysmal nocturnal dyspnea and syncope.   Gastrointestinal:  Positive for diarrhea.   Neurological:  Positive for dizziness and vertigo.       Last Recorded Vitals:  Vitals:    02/13/25 1039   BP: 118/78   BP Location: Left arm   Patient Position: Sitting   Pulse: 90   SpO2: 100%   Weight: 78.8 kg (173 lb 12.8 oz)   Height: 1.676 m (5' 6\")       Physical Examination:  GENERAL: NAD, central adiposity   HEENT: no JVD  CV: RRR without m/r/g  PULM: CTAB  EXT: non-edematous bilateral lower extremities    Laboratory Studies:  Lab Results   Component Value Date    GLUCOSE 87 01/22/2025    CALCIUM 9.2 01/22/2025     01/22/2025    K 3.7 01/22/2025    CO2 27 01/22/2025     01/22/2025    BUN 4 (L) 01/22/2025    CREATININE 0.70 01/22/2025     Lab Results   Component Value Date    ALT 22 01/22/2025    AST 15 01/22/2025    ALKPHOS 89 01/22/2025    BILITOT 0.4 01/22/2025         Lab Results   Component Value Date    CHOL 155 08/14/2024    CHOL 199 08/16/2023    CHOL 117 04/21/2023     Lab Results   Component Value Date    HDL 29.2 08/14/2024    HDL 32.8 (A) 08/16/2023    HDL 27.0 (A) 04/21/2023     Lab Results   Component Value Date    LDLCALC 72 08/14/2024     Lab Results   Component Value Date    TRIG 269 (H) 08/14/2024    TRIG 276 (H) 08/16/2023    TRIG 267 (H) 04/21/2023     No components found for: \"CHOLHDL\"  Lab Results   Component Value Date    HGBA1C 4.2 (L) 12/06/2023     No components found for: \"UACR\"    Cardiology Tests:  ECG:No results found for this or any previous visit from the past 1095 days.      Echo:No results " found for this or any previous visit from the past 1095 days.      Cath:No results found for this or any previous visit from the past 1095 days.      Stress Test:No results found for this or any previous visit from the past 1095 days.      Cardiac Imaging:No results found for this or any previous visit from the past 1095 days.      The 10-year ASCVD risk score (Stefania KYLE, et al., 2019) is: 1.3%    Values used to calculate the score:      Age: 39 years      Sex: Female      Is Non- : Yes      Diabetic: No      Tobacco smoker: No      Systolic Blood Pressure: 118 mmHg      Is BP treated: No      HDL Cholesterol: 29.2 mg/dL      Total Cholesterol: 155 mg/dL     I personally reviewed labs, diagnostics, and progress notes.   Assessment and Plan:  Problem List Items Addressed This Visit       Hypertriglyceridemia without hypercholesterolemia     Drug-induced, last  in August 2024. Will defer further management to PCP          Patent foramen ovale ([facility])    MVP (mitral valve prolapse)     No murmur or opening snap on exam. No prior echo. Will obtain imaging to further evaluate.          Erythromelalgia ([facility])     Gabapentin helped symptoms however caused undesirable side effects affecting employment. Defer further evaluation with hematology          Palpitations - Primary     Chronic, may be stress-related but will rule out conduction disturbances with 1 week event monitor. Offered beta-blocker pending echo/event monitor results.          Relevant Orders    ECG 12 lead (Clinic Performed)    Transthoracic Echo (TTE) Complete    Holter Or Event Cardiac Monitor     Other Visit Diagnoses       Tachycardia        Relevant Orders    Transthoracic Echo (TTE) Complete    Holter Or Event Cardiac Monitor          I spent 40 minutes of face-to-face time with the patient, with more than 50% of that time spent in counseling and/or coordination of care.      Follow up via phone after testing.     Irina  STORMY Sosa, APRN-CNP  Clinician,  [facility]  Lead Clinician, [facility]

## 2025-02-13 NOTE — ASSESSMENT & PLAN NOTE
Chronic, may be stress-related but will rule out conduction disturbances with 1 week event monitor. Offered beta-blocker pending echo/event monitor results.

## 2025-02-13 NOTE — ASSESSMENT & PLAN NOTE
Gabapentin helped symptoms however caused undesirable side effects affecting employment. Defer further evaluation with hematology

## 2025-02-17 DIAGNOSIS — J30.1 SEASONAL ALLERGIC RHINITIS DUE TO POLLEN: ICD-10-CM

## 2025-02-17 DIAGNOSIS — J30.89 ALLERGIC RHINITIS DUE TO MOLD: ICD-10-CM

## 2025-02-17 RX ORDER — LEVOCETIRIZINE DIHYDROCHLORIDE 5 MG/1
5 TABLET, FILM COATED ORAL DAILY
Qty: 30 TABLET | Refills: 0 | Status: SHIPPED | OUTPATIENT
Start: 2025-02-17

## 2025-02-19 ENCOUNTER — APPOINTMENT (OUTPATIENT)
Dept: ALLERGY | Facility: CLINIC | Age: 40
End: 2025-02-19
Payer: COMMERCIAL

## 2025-02-19 ENCOUNTER — OFFICE VISIT (OUTPATIENT)
Dept: PRIMARY CARE | Facility: CLINIC | Age: 40
End: 2025-02-19
Payer: COMMERCIAL

## 2025-02-19 VITALS
HEART RATE: 88 BPM | WEIGHT: 178 LBS | DIASTOLIC BLOOD PRESSURE: 75 MMHG | RESPIRATION RATE: 16 BRPM | HEIGHT: 68 IN | SYSTOLIC BLOOD PRESSURE: 122 MMHG | OXYGEN SATURATION: 100 % | TEMPERATURE: 97.3 F | BODY MASS INDEX: 26.98 KG/M2

## 2025-02-19 DIAGNOSIS — F31.9 BIPOLAR 1 DISORDER (MULTI): Primary | ICD-10-CM

## 2025-02-19 DIAGNOSIS — R42 DIZZINESS: ICD-10-CM

## 2025-02-19 DIAGNOSIS — R00.2 PALPITATIONS: ICD-10-CM

## 2025-02-19 PROCEDURE — 3008F BODY MASS INDEX DOCD: CPT | Performed by: FAMILY MEDICINE

## 2025-02-19 PROCEDURE — 99213 OFFICE O/P EST LOW 20 MIN: CPT | Performed by: FAMILY MEDICINE

## 2025-02-19 PROCEDURE — G2211 COMPLEX E/M VISIT ADD ON: HCPCS | Performed by: FAMILY MEDICINE

## 2025-02-19 PROCEDURE — 1036F TOBACCO NON-USER: CPT | Performed by: FAMILY MEDICINE

## 2025-02-19 ASSESSMENT — ENCOUNTER SYMPTOMS
LOSS OF SENSATION IN FEET: 0
OCCASIONAL FEELINGS OF UNSTEADINESS: 0
DEPRESSION: 0

## 2025-02-19 ASSESSMENT — PAIN SCALES - GENERAL: PAINLEVEL_OUTOF10: 0-NO PAIN

## 2025-02-19 ASSESSMENT — PATIENT HEALTH QUESTIONNAIRE - PHQ9
1. LITTLE INTEREST OR PLEASURE IN DOING THINGS: NOT AT ALL
2. FEELING DOWN, DEPRESSED OR HOPELESS: NOT AT ALL
SUM OF ALL RESPONSES TO PHQ9 QUESTIONS 1 AND 2: 0

## 2025-02-19 NOTE — PROGRESS NOTES
"Laz Manriquez \"Veena\" is a 39 y.o. adult who presents for follow-up    Vertigo and diarrhea  - Has since resolved. No further issues    Palpitations  - Following with cardiology, has holter in place    Bipolar disorder, PTSD  - Continuing to follow with therapist and psychiatrist   - Was off medication, working on taking it consistently   - Denies SI      Review of Systems  10 point review of systems negative except as noted in HPI.    Objective   Blood pressure 122/75, pulse 88, temperature 36.3 °C (97.3 °F), resp. rate 16, height 1.727 m (5' 8\"), weight 80.7 kg (178 lb), SpO2 100%.  Body mass index is 27.06 kg/m².  General: well appearing, no distress  CV: Regular rate and rhythm, no murmur  Lungs: Clear to auscultation bilaterally  Abdomen: Soft, nontender, nondistended  Extremities: No edema noted  Psych: Appropriate mood and affect         Assessment/Plan   38 yo here for follow-up    Bipolar disorder, PTSD  - Encouraged continued counseling  - Follow-up psychiatry     Palpitations- follow-up holter    Vertigo and diarrhea- resolved, monitor for recurrence     Follow-up 3-4 months    "

## 2025-02-21 LAB
ATRIAL RATE: 88 BPM
P AXIS: 64 DEGREES
P OFFSET: 190 MS
P ONSET: 146 MS
PR INTERVAL: 150 MS
Q ONSET: 221 MS
QRS COUNT: 15 BEATS
QRS DURATION: 74 MS
QT INTERVAL: 360 MS
QTC CALCULATION(BAZETT): 435 MS
QTC FREDERICIA: 409 MS
R AXIS: 51 DEGREES
T AXIS: 48 DEGREES
T OFFSET: 401 MS
VENTRICULAR RATE: 88 BPM

## 2025-03-03 LAB — BODY SURFACE AREA: 1.92 M2

## 2025-03-05 ENCOUNTER — CLINICAL SUPPORT (OUTPATIENT)
Dept: ALLERGY | Facility: HOSPITAL | Age: 40
End: 2025-03-05
Payer: COMMERCIAL

## 2025-03-05 VITALS — HEART RATE: 91 BPM | TEMPERATURE: 97.7 F | SYSTOLIC BLOOD PRESSURE: 125 MMHG | DIASTOLIC BLOOD PRESSURE: 74 MMHG

## 2025-03-05 DIAGNOSIS — J30.89 ALLERGIC RHINITIS DUE TO MOLD: ICD-10-CM

## 2025-03-05 DIAGNOSIS — J30.81 ALLERGIC RHINITIS DUE TO ANIMAL DANDER: ICD-10-CM

## 2025-03-05 DIAGNOSIS — J30.89 ALLERGIC RHINITIS DUE TO HOUSE DUST MITE: ICD-10-CM

## 2025-03-05 PROCEDURE — 95117 IMMUNOTHERAPY INJECTIONS: CPT | Performed by: ALLERGY & IMMUNOLOGY

## 2025-03-05 PROCEDURE — INJT2 ALLERGY IMMUNOTHERAPY 2+ INJECTIONS: Performed by: ALLERGY & IMMUNOLOGY

## 2025-03-06 NOTE — PROGRESS NOTES
Lillian Manriquez is a 39 y.o. adult with past medical history of bipolar disorder who is referred by PCP Dr. Emmy Alcantara for diarrhea.    She reports a *** history of ***. Associated with ***. Worse with ***.    Celiac serologies and TSH normal. Fecal calprotectin pending ***.    She has tried *** with *** improvement.     Denies nausea, vomiting, heartburn, early satiety, and bloating. No unintentional weight loss.     She does not take NSAIDS regularly.     No prior EGD or colonoscopy.    Social history: -    Family history: Denies family history of colon cancer or other GI disorders or malignancy.     Past Medical History:   Diagnosis Date    Acute upper respiratory infection, unspecified 02/24/2020    Acute URI    Encounter for examination of ears and hearing without abnormal findings 10/12/2015    Examination of ears and hearing    Flat foot (pes planus) (acquired), right foot 09/19/2017    Flat feet    Personal history of other (healed) physical injury and trauma 08/29/2018    History of contusion    Personal history of other diseases of the digestive system     History of gastroesophageal reflux (GERD)    Personal history of other diseases of the digestive system 12/18/2019    History of gastroesophageal reflux (GERD)    Personal history of other diseases of the female genital tract 10/25/2016    History of ovarian cyst    Personal history of other diseases of the respiratory system 02/24/2020    History of sore throat    Personal history of other diseases of the respiratory system 08/04/2020    History of tonsillitis    Personal history of other mental and behavioral disorders     History of depression    Personal history of other specified conditions 07/19/2020    History of fever     Past Surgical History:   Procedure Laterality Date    OTHER SURGICAL HISTORY  01/16/2019    Leg surgery     Current Outpatient Medications   Medication Sig Dispense Refill    acetaminophen (Tylenol) 500 mg tablet Take  1-2 tablets (500-1,000 mg) by mouth every 6 hours if needed for mild pain (1 - 3).      albuterol 90 mcg/actuation inhaler Inhale 2 puffs see administration instructions. EVERY 4-6 HOURS AS NEEDED. 18 g 3    buPROPion SR (Wellbutrin SR) 150 mg 12 hr tablet Take 1 tablet (150 mg) by mouth once daily. Do not crush, chew, or split. 30 tablet 5    busPIRone (Buspar) 5 mg tablet Take 1 tablet (5 mg) by mouth 2 times a day. 60 tablet 5    cetirizine (ZyrTEC) 10 mg tablet Take 1 tablet (10 mg) by mouth once daily. (Patient not taking: Reported on 2/19/2025)      cholecalciferol (Vitamin D-3) 25 MCG (1000 UT) tablet Take 2 tablets (2,000 Units) by mouth once daily. (Patient not taking: Reported on 2/19/2025)      clobetasol (Temovate) 0.05 % ointment Apply 1 Application topically if needed. APPLY TO MOSQUITO BITES AS NEEDED      docusate sodium (Colace) 100 mg capsule Take 1 capsule (100 mg) by mouth twice a day. (Patient not taking: Reported on 2/19/2025)      DULoxetine (Cymbalta) 20 mg DR capsule Take 1 capsule (20 mg) by mouth once daily. Do not crush or chew. 30 capsule 5    dupilumab (Dupixent) 300 mg/2 mL pen injector Inject 2 mL (300 mg) under the skin every 14 (fourteen) days.      enoxaparin (Lovenox) 40 mg/0.4 mL syringe  (Patient not taking: Reported on 2/19/2025)      fexofenadine (Allegra) 180 mg tablet Take 1 tablet (180 mg) by mouth once daily. (Patient not taking: Reported on 2/19/2025)      fluticasone (Flonase) 50 mcg/actuation nasal spray Administer 2 sprays into each nostril once daily. 16 g 3    hydrocortisone 2.5 % cream Apply topically once daily. 30 g 5    hydroxyurea (Hydrea) 500 mg capsule Take 2 capsules (1,000 mg total) by mouth once daily.  Take at the same time each day. 180 capsule 1    hydrOXYzine HCL (Atarax) 10 mg tablet Take 1 tablet (10 mg) by mouth 3 times a day as needed for anxiety. 90 tablet 0    inhaler, assist devices (E-Z SPACER MISC) 1 Units see administration instructions. USE  WITH INHALER AS DIRECTED (Patient not taking: Reported on 2/19/2025)      lamoTRIgine (LaMICtal) 150 mg tablet Take 1 tablet (150 mg) by mouth once daily. 30 tablet 5    levocetirizine (Xyzal) 5 mg tablet Take 1 tablet by mouth once daily 30 tablet 0    meclizine (Antivert) 12.5 mg tablet Take 1 tablet (12.5 mg) by mouth 3 times a day as needed for dizziness. 30 tablet 1    methocarbamol (Robaxin) 750 mg tablet TAKE 1 TABLET BY MOUTH EVERY 6 HOURS AS NEEDED FOR 3 DAYS      ondansetron ODT (Zofran-ODT) 4 mg disintegrating tablet Dissolve 1 tablet (4 mg) in the mouth every 8 hours if needed for nausea or vomiting. 20 tablet 0    topiramate (Topamax) 50 mg tablet Take 2 tablets (100 mg) by mouth once daily at bedtime. 60 tablet 5    triamcinolone (Kenalog) 0.1 % ointment Apply 1 Application topically if needed (APPLY ONE APPLICATION TOPICALLY ON ARMS/LEGS DAILY AS NEEDED FOR DRY AREAS AND FOR DARK BUMPS).       Current Facility-Administered Medications   Medication Dose Route Frequency Provider Last Rate Last Admin    ALLERGEN REFILL VIAL 1 & 2   subcutaneous PRN Princess TANIA Morales MD        ALLERGEN REFILL VIAL 1 & 2   subcutaneous PRN Princess TANIA Morales MD             Review of Systems  Review of Systems negative except as noted in HPI.    Objective     LMP  (LMP Unknown)      Physical Exam  Constitutional:  No acute distress. Normal appearance. Not ill-appearing.  HENT:  Head normocephalic and atraumatic. Conjunctivae normal.  Cardiovascular:  Normal rate. Regular rhythm.  Pulmonary:  Pulmonary effort normal. No respiratory distress. Breath sounds clear.  Abdominal:  Abdomen is flat and soft. There is no distension. No tenderness or guarding.  Skin: Dry.  Neurological:  Alert and oriented.  Psychiatric:  Mood and affect normal.    Assessment/Plan     39 y.o. adult with history of *** who presents today for clinic visit for *** history of ***.    Recommendations  1.  2. Follow up    Electronically signed by: Aysha  Raf SAVAGE on 3/6/2025 at 10:25 AM

## 2025-03-14 ENCOUNTER — ANCILLARY PROCEDURE (OUTPATIENT)
Dept: CARDIOLOGY | Facility: CLINIC | Age: 40
End: 2025-03-14
Payer: COMMERCIAL

## 2025-03-14 DIAGNOSIS — R00.2 PALPITATIONS: ICD-10-CM

## 2025-03-14 DIAGNOSIS — R00.0 TACHYCARDIA: ICD-10-CM

## 2025-03-14 LAB
AORTIC VALVE MEAN GRADIENT: 4 MMHG
AORTIC VALVE PEAK VELOCITY: 1.34 M/S
AV PEAK GRADIENT: 7 MMHG
AVA (PEAK VEL): 2.04 CM2
AVA (VTI): 2.07 CM2
EJECTION FRACTION APICAL 4 CHAMBER: 60.4
EJECTION FRACTION: 59 %
LEFT ATRIUM VOLUME AREA LENGTH INDEX BSA: 26 ML/M2
LEFT VENTRICLE INTERNAL DIMENSION DIASTOLE: 4.07 CM (ref 3.5–6)
LEFT VENTRICULAR OUTFLOW TRACT DIAMETER: 1.97 CM
MITRAL VALVE E/A RATIO: 0.99
RIGHT VENTRICLE FREE WALL PEAK S': 16 CM/S
RIGHT VENTRICLE PEAK SYSTOLIC PRESSURE: 19.9 MMHG
TRICUSPID ANNULAR PLANE SYSTOLIC EXCURSION: 2 CM

## 2025-03-14 PROCEDURE — 93306 TTE W/DOPPLER COMPLETE: CPT | Performed by: INTERNAL MEDICINE

## 2025-03-14 PROCEDURE — 93306 TTE W/DOPPLER COMPLETE: CPT

## 2025-03-18 ENCOUNTER — APPOINTMENT (OUTPATIENT)
Dept: GASTROENTEROLOGY | Facility: HOSPITAL | Age: 40
End: 2025-03-18
Payer: COMMERCIAL

## 2025-03-18 DIAGNOSIS — R00.2 PALPITATIONS: Primary | ICD-10-CM

## 2025-03-18 RX ORDER — PROPRANOLOL HYDROCHLORIDE 10 MG/1
TABLET ORAL
Qty: 90 TABLET | Refills: 0 | Status: SHIPPED | OUTPATIENT
Start: 2025-03-18

## 2025-03-25 ENCOUNTER — APPOINTMENT (OUTPATIENT)
Dept: CARDIOLOGY | Facility: CLINIC | Age: 40
End: 2025-03-25
Payer: COMMERCIAL

## 2025-04-02 ENCOUNTER — CLINICAL SUPPORT (OUTPATIENT)
Dept: ALLERGY | Facility: HOSPITAL | Age: 40
End: 2025-04-02
Payer: COMMERCIAL

## 2025-04-02 VITALS
HEART RATE: 76 BPM | TEMPERATURE: 97.9 F | SYSTOLIC BLOOD PRESSURE: 130 MMHG | OXYGEN SATURATION: 100 % | DIASTOLIC BLOOD PRESSURE: 80 MMHG

## 2025-04-02 DIAGNOSIS — J30.81 ALLERGIC RHINITIS DUE TO ANIMAL DANDER: ICD-10-CM

## 2025-04-02 DIAGNOSIS — J30.89 ALLERGIC RHINITIS DUE TO HOUSE DUST MITE: ICD-10-CM

## 2025-04-02 PROCEDURE — 95117 IMMUNOTHERAPY INJECTIONS: CPT | Performed by: ALLERGY & IMMUNOLOGY

## 2025-04-02 PROCEDURE — 2500000001 HC RX 250 WO HCPCS SELF ADMINISTERED DRUGS (ALT 637 FOR MEDICARE OP): Performed by: ALLERGY & IMMUNOLOGY

## 2025-04-02 RX ORDER — CETIRIZINE HYDROCHLORIDE 10 MG/1
10 TABLET ORAL ONCE
Status: COMPLETED | OUTPATIENT
Start: 2025-04-02 | End: 2025-04-02

## 2025-04-02 RX ADMIN — CETIRIZINE HYDROCHLORIDE 10 MG: 10 TABLET, FILM COATED ORAL at 09:45

## 2025-04-17 ENCOUNTER — APPOINTMENT (OUTPATIENT)
Dept: BEHAVIORAL HEALTH | Facility: CLINIC | Age: 40
End: 2025-04-17
Payer: COMMERCIAL

## 2025-04-18 ENCOUNTER — TELEPHONE (OUTPATIENT)
Dept: BEHAVIORAL HEALTH | Facility: CLINIC | Age: 40
End: 2025-04-18

## 2025-04-18 ENCOUNTER — TELEPHONE (OUTPATIENT)
Dept: HEMATOLOGY/ONCOLOGY | Facility: CLINIC | Age: 40
End: 2025-04-18
Payer: COMMERCIAL

## 2025-04-18 DIAGNOSIS — D47.3 ESSENTIAL THROMBOCYTHEMIA (MULTI): Primary | ICD-10-CM

## 2025-05-07 ENCOUNTER — CLINICAL SUPPORT (OUTPATIENT)
Dept: ALLERGY | Facility: HOSPITAL | Age: 40
End: 2025-05-07
Payer: COMMERCIAL

## 2025-05-07 VITALS — SYSTOLIC BLOOD PRESSURE: 129 MMHG | TEMPERATURE: 97.7 F | DIASTOLIC BLOOD PRESSURE: 85 MMHG

## 2025-05-07 DIAGNOSIS — J30.81 ALLERGIC RHINITIS DUE TO ANIMAL DANDER: ICD-10-CM

## 2025-05-07 DIAGNOSIS — J30.89 ALLERGIC RHINITIS DUE TO MOLD: ICD-10-CM

## 2025-05-07 DIAGNOSIS — J30.89 ALLERGIC RHINITIS DUE TO HOUSE DUST MITE: ICD-10-CM

## 2025-05-07 PROCEDURE — 95117 IMMUNOTHERAPY INJECTIONS: CPT | Performed by: ALLERGY & IMMUNOLOGY

## 2025-05-08 ENCOUNTER — TELEMEDICINE (OUTPATIENT)
Dept: BEHAVIORAL HEALTH | Facility: CLINIC | Age: 40
End: 2025-05-08
Payer: COMMERCIAL

## 2025-05-08 DIAGNOSIS — F31.9 BIPOLAR 1 DISORDER (MULTI): Primary | ICD-10-CM

## 2025-05-08 NOTE — PROGRESS NOTES
Adult Ambulatory Psychiatry Note - Visit to be Voided    Pt is at home ([id number] [id number] )  Writer is at home office    Virtual or Telephone Consent    An interactive audio and video telecommunication system which permits real time communications between the patient (at the originating site) and provider (at the distant site) was utilized to provide this telehealth service.   Verbal consent was requested and obtained from Lillian Manriquez on this date, 05/08/25 for a telehealth visit.      Pt reports she scheduled this appt in error, normally follows with Dr. Navarro and was trying to schedule a routine follow-up visit with him. This visit will be voided. Encouraged pt to reschedule with Dr. Navarro and will also notify Dr. Navarro as well.    Elizabet Matamoros MD  Psychiatry PGY-4

## 2025-05-09 DIAGNOSIS — F41.9 ANXIETY: ICD-10-CM

## 2025-05-09 DIAGNOSIS — F31.9 BIPOLAR 1 DISORDER (MULTI): ICD-10-CM

## 2025-05-09 RX ORDER — BUPROPION HYDROCHLORIDE 150 MG/1
150 TABLET, EXTENDED RELEASE ORAL DAILY
Qty: 30 TABLET | Refills: 5 | Status: SHIPPED | OUTPATIENT
Start: 2025-05-09 | End: 2025-11-05

## 2025-05-09 RX ORDER — HYDROXYZINE HYDROCHLORIDE 10 MG/1
10 TABLET, FILM COATED ORAL 3 TIMES DAILY PRN
Qty: 90 TABLET | Refills: 0 | Status: SHIPPED | OUTPATIENT
Start: 2025-05-09 | End: 2025-06-08

## 2025-05-09 RX ORDER — DULOXETIN HYDROCHLORIDE 20 MG/1
20 CAPSULE, DELAYED RELEASE ORAL DAILY
Qty: 30 CAPSULE | Refills: 5 | Status: SHIPPED | OUTPATIENT
Start: 2025-05-09 | End: 2025-11-05

## 2025-05-09 RX ORDER — TOPIRAMATE 50 MG/1
100 TABLET, FILM COATED ORAL NIGHTLY
Qty: 60 TABLET | Refills: 5 | Status: SHIPPED | OUTPATIENT
Start: 2025-05-09

## 2025-05-09 RX ORDER — LAMOTRIGINE 150 MG/1
150 TABLET ORAL DAILY
Qty: 30 TABLET | Refills: 5 | Status: SHIPPED | OUTPATIENT
Start: 2025-05-09 | End: 2025-11-05

## 2025-05-09 RX ORDER — BUSPIRONE HYDROCHLORIDE 5 MG/1
5 TABLET ORAL 2 TIMES DAILY
Qty: 60 TABLET | Refills: 5 | Status: SHIPPED | OUTPATIENT
Start: 2025-05-09 | End: 2025-11-05

## 2025-05-12 ENCOUNTER — LAB (OUTPATIENT)
Dept: LAB | Facility: HOSPITAL | Age: 40
End: 2025-05-12
Payer: COMMERCIAL

## 2025-05-12 ENCOUNTER — APPOINTMENT (OUTPATIENT)
Dept: HEMATOLOGY/ONCOLOGY | Facility: HOSPITAL | Age: 40
End: 2025-05-12
Payer: COMMERCIAL

## 2025-05-12 DIAGNOSIS — D47.3 ESSENTIAL THROMBOCYTHEMIA (MULTI): ICD-10-CM

## 2025-05-12 PROBLEM — R63.5 ABNORMAL WEIGHT GAIN: Status: RESOLVED | Noted: 2023-02-15 | Resolved: 2025-05-12

## 2025-05-12 PROBLEM — R51.9 ACHING HEADACHE: Status: RESOLVED | Noted: 2021-01-13 | Resolved: 2025-05-12

## 2025-05-12 LAB
ALBUMIN SERPL BCP-MCNC: 4.6 G/DL (ref 3.4–5)
ALP SERPL-CCNC: 69 U/L (ref 33–120)
ALT SERPL W P-5'-P-CCNC: 12 U/L (ref 7–52)
ANION GAP SERPL CALC-SCNC: 13 MMOL/L (ref 10–20)
AST SERPL W P-5'-P-CCNC: 15 U/L (ref 9–39)
BASOPHILS # BLD AUTO: 0.04 X10*3/UL (ref 0–0.1)
BASOPHILS NFR BLD AUTO: 0.7 %
BILIRUB SERPL-MCNC: 0.4 MG/DL (ref 0–1.2)
BUN SERPL-MCNC: 12 MG/DL (ref 6–23)
CALCIUM SERPL-MCNC: 9.3 MG/DL (ref 8.6–10.3)
CHLORIDE SERPL-SCNC: 102 MMOL/L (ref 98–107)
CO2 SERPL-SCNC: 25 MMOL/L (ref 21–32)
CREAT SERPL-MCNC: 1.08 MG/DL (ref 0.5–1.3)
EGFRCR SERPLBLD CKD-EPI 2021: 67 ML/MIN/1.73M*2
EOSINOPHIL # BLD AUTO: 0.04 X10*3/UL (ref 0–0.7)
EOSINOPHIL NFR BLD AUTO: 0.7 %
ERYTHROCYTE [DISTWIDTH] IN BLOOD BY AUTOMATED COUNT: 13.1 % (ref 11.5–14.5)
FERRITIN SERPL-MCNC: 444 NG/ML (ref 8–300)
FOLATE SERPL-MCNC: 10.7 NG/ML
GLUCOSE SERPL-MCNC: 92 MG/DL (ref 74–99)
HCT VFR BLD AUTO: 37.3 % (ref 36–52)
HGB BLD-MCNC: 12.9 G/DL (ref 12–17.5)
IMM GRANULOCYTES # BLD AUTO: 0.01 X10*3/UL (ref 0–0.7)
IMM GRANULOCYTES NFR BLD AUTO: 0.2 % (ref 0–0.9)
IRON SATN MFR SERPL: 23 % (ref 25–45)
IRON SERPL-MCNC: 68 UG/DL (ref 35–150)
LYMPHOCYTES # BLD AUTO: 3.15 X10*3/UL (ref 1.2–4.8)
LYMPHOCYTES NFR BLD AUTO: 54.4 %
MCH RBC QN AUTO: 37.1 PG (ref 26–34)
MCHC RBC AUTO-ENTMCNC: 34.6 G/DL (ref 32–36)
MCV RBC AUTO: 107 FL (ref 80–100)
MONOCYTES # BLD AUTO: 0.36 X10*3/UL (ref 0.1–1)
MONOCYTES NFR BLD AUTO: 6.2 %
NEUTROPHILS # BLD AUTO: 2.19 X10*3/UL (ref 1.2–7.7)
NEUTROPHILS NFR BLD AUTO: 37.8 %
NRBC BLD-RTO: 0 /100 WBCS (ref 0–0)
PLATELET # BLD AUTO: 454 X10*3/UL (ref 150–450)
POTASSIUM SERPL-SCNC: 3.8 MMOL/L (ref 3.5–5.3)
PROT SERPL-MCNC: 8.1 G/DL (ref 6.4–8.2)
RBC # BLD AUTO: 3.48 X10*6/UL (ref 4–5.9)
SODIUM SERPL-SCNC: 136 MMOL/L (ref 136–145)
TIBC SERPL-MCNC: 293 UG/DL (ref 240–445)
UIBC SERPL-MCNC: 225 UG/DL (ref 110–370)
VIT B12 SERPL-MCNC: 600 PG/ML (ref 211–911)
WBC # BLD AUTO: 5.8 X10*3/UL (ref 4.4–11.3)

## 2025-05-12 PROCEDURE — 80053 COMPREHEN METABOLIC PANEL: CPT

## 2025-05-12 PROCEDURE — 36415 COLL VENOUS BLD VENIPUNCTURE: CPT

## 2025-05-12 PROCEDURE — 83550 IRON BINDING TEST: CPT

## 2025-05-12 PROCEDURE — 82728 ASSAY OF FERRITIN: CPT

## 2025-05-12 PROCEDURE — 85025 COMPLETE CBC W/AUTO DIFF WBC: CPT

## 2025-05-12 PROCEDURE — 82607 VITAMIN B-12: CPT

## 2025-05-12 PROCEDURE — 82746 ASSAY OF FOLIC ACID SERUM: CPT

## 2025-05-13 ENCOUNTER — OFFICE VISIT (OUTPATIENT)
Dept: HEMATOLOGY/ONCOLOGY | Facility: HOSPITAL | Age: 40
End: 2025-05-13
Payer: COMMERCIAL

## 2025-05-13 VITALS
OXYGEN SATURATION: 99 % | SYSTOLIC BLOOD PRESSURE: 122 MMHG | TEMPERATURE: 97.3 F | DIASTOLIC BLOOD PRESSURE: 65 MMHG | RESPIRATION RATE: 18 BRPM | WEIGHT: 178.57 LBS | HEART RATE: 100 BPM | BODY MASS INDEX: 27.15 KG/M2

## 2025-05-13 DIAGNOSIS — D47.3 ESSENTIAL THROMBOCYTHEMIA (MULTI): Primary | ICD-10-CM

## 2025-05-13 PROCEDURE — 99215 OFFICE O/P EST HI 40 MIN: CPT | Mod: GC | Performed by: INTERNAL MEDICINE

## 2025-05-13 PROCEDURE — G2211 COMPLEX E/M VISIT ADD ON: HCPCS | Performed by: INTERNAL MEDICINE

## 2025-05-13 PROCEDURE — 1036F TOBACCO NON-USER: CPT | Performed by: INTERNAL MEDICINE

## 2025-05-13 PROCEDURE — 99215 OFFICE O/P EST HI 40 MIN: CPT | Performed by: INTERNAL MEDICINE

## 2025-05-13 ASSESSMENT — PAIN SCALES - GENERAL: PAINLEVEL_OUTOF10: 0-NO PAIN

## 2025-05-13 NOTE — PROGRESS NOTES
"    Patient ID: Lillian Manriquez \"Veena\" is a 39 y.o. adult.  Referring Physician: No referring provider defined for this encounter.  Primary Care Provider: Emmy Alcantara MD      Assessment/Plan            Assessment & Plan  Essential thrombocythemia (Multi)  40 y/o female assigned at birth (transgender non-binary who prefers \"they/them/their\" pronouns) presenting for follow-up of JAK2+ ET. Patient currently on hydrea 1000mg daily, continues to have symptoms most prominently erythromelalgia. Discussed trying to increase aspirin to BID as erythromelalgia is due to platelet activation and arterial microvascular thrombosis. We also discussed three options for treatment: 1) increasing hydrea dose to try and improve symptoms, 2) switching to interferon but will opt against to history of mental health disorders, or 3) switching to ruxolitinib to try and obtain better symptom control. Patient interested in switching to ruxolitinib but willing to try small increase in hydrea - will continue 1000 daily but inc dose 3d/week to an extra 500 (1500mg total MWF). Will also send ruxolitinib to pharmacy for authorization.    -inc hydrea dose to 1000mg T/Th/Sat/Sun, 1500mg M/W/F  -inc aspirin to 81mg twice daily  -monitor symptoms  -RTC in 3mo w/ labs prior     ____________________________________________________________________________________________________________________    HISTORY  40 y/o female assigned at birth (transgender non-binary who prefers \"they/them/their\" pronouns) presenting for follow-up of JAK2+ ET. Patient previously followed by Laron Calvin, here to transfer care. They are taking 1000mg hydrea daily which they have been taking for a few years now but continues to have symptoms. Most prominent symptoms include erythromelalgia with pain in fingers that can be severe and limiting at times. Has tried being on higher doses of hydrea with worsening cytopenias. When on less hydrea has worsening of symptoms. No " "fevers or chills, no night sweats, does have burning with hot showers, no early satiety, no bruising or bleeding, no strokes.    Dx 8/24/2009:  FINAL DIAGNOSIS   A&B:   BONE MARROW ASPIRATE, CLOT AND CORE BIOPSY, RIGHT:     NORMOCELLULAR BONE MARROW WITH INCREASED MEGAKARYOCYTES WITH MILD ATYPIA, SEE NOTE.   Cyto 46XX nuc swetha 9q34  JAK2 V617F positive on 3/8/11 (surgical path report from [facility])    MPN SYMPTOM ASSESSMENT   [0 (absent) - 10 (worst imaginable)]      Fatigue past 24 HRS 5 Filling up quickly 5   Abdominal discomfort 7 Inactivity 0   Problems with concentration N/a Night sweats 0   Itching 10 Bone pain (not diffuse jt pain or arthritis) 8   Fever (>100F) 0 Unintentional weight loss over last 6 months:  0     Total Symptom Score 36      Lillian Manriquez \"Veena\"  reports that Veena has never smoked. Veena has never used smokeless tobacco.  Veena  reports that Veena does not currently use alcohol.  Veena  reports no history of drug use.  Family History[1]  Oncology History    No history exists.       Performance Status:  Symptomatic; fully ambulatory    Objective   BSA: 1.97 meters squared  /65   Pulse 100   Temp 36.3 °C (97.3 °F) (Core)   Resp 18   Wt 81 kg (178 lb 9.2 oz)   LMP  (LMP Unknown)   SpO2 99%   BMI 27.15 kg/m²   Physical Exam  Constitutional:       Appearance: Normal appearance.   Eyes:      Conjunctiva/sclera: Conjunctivae normal.      Pupils: Pupils are equal, round, and reactive to light.   Skin:     General: Skin is warm and dry.      Findings: No rash.   Psychiatric:         Mood and Affect: Mood normal.            Pt seen and discussed with Dr. Yoder.    Ludwin Tavares DO  Hem/Onc Fellow, PGY7                           [1]   Family History  Problem Relation Name Age of Onset    Diabetes Mother      Heart failure Mother      Hypertension Mother      Schizophrenia Mother      COPD Father       "

## 2025-05-13 NOTE — ASSESSMENT & PLAN NOTE
"38 y/o female assigned at birth (transgender non-binary who prefers \"they/them/their\" pronouns) presenting for follow-up of JAK2+ ET. Patient currently on hydrea 1000mg daily, continues to have symptoms most prominently erythromelalgia. Discussed trying to increase aspirin to BID as erythromelalgia is due to platelet activation and arterial microvascular thrombosis. We also discussed three options for treatment: 1) increasing hydrea dose to try and improve symptoms, 2) switching to interferon but will opt against to history of mental health disorders, or 3) switching to ruxolitinib to try and obtain better symptom control. Patient interested in switching to ruxolitinib but willing to try small increase in hydrea - will continue 1000 daily but inc dose 3d/week to an extra 500 (1500mg total MWF). Will also send ruxolitinib to pharmacy for authorization.    -inc hydrea dose to 1000mg T/Th/Sat/Sun, 1500mg M/W/F  -inc aspirin to 81mg twice daily  -monitor symptoms  -RTC in 3mo w/ labs prior  "

## 2025-05-14 ENCOUNTER — PATIENT OUTREACH (OUTPATIENT)
Dept: HEMATOLOGY/ONCOLOGY | Facility: HOSPITAL | Age: 40
End: 2025-05-14
Payer: COMMERCIAL

## 2025-05-14 NOTE — PROGRESS NOTES
Learner: patient  Educated on: plan and assessment  Readiness: acceptance, eager, and nonacceptance  Preferred learning method: preferred: reading and writing  Method used: explanation  Response: demonstrated understanding and verbalizes understanding  Barriers: None  Preferred language: English    Patient medications and pharmacy reviewed. Patient expressed history of anxiety and managed by psychiatry.

## 2025-05-19 PROBLEM — R73.09 ELEVATED GLUCOSE: Status: RESOLVED | Noted: 2025-02-12 | Resolved: 2025-05-19

## 2025-05-19 PROBLEM — R52 ACUTE PAIN: Status: RESOLVED | Noted: 2023-02-15 | Resolved: 2025-05-19

## 2025-05-19 PROBLEM — M25.561 KNEE PAIN, RIGHT: Status: RESOLVED | Noted: 2022-02-17 | Resolved: 2025-05-19

## 2025-05-19 PROBLEM — L81.1 CHLOASMA: Status: RESOLVED | Noted: 2023-06-29 | Resolved: 2025-05-19

## 2025-05-19 PROBLEM — J30.89 ALLERGIC RHINITIS DUE TO HOUSE DUST MITE: Status: RESOLVED | Noted: 2023-12-14 | Resolved: 2025-05-19

## 2025-05-19 PROBLEM — I73.81 ERYTHROMELALGIA: Status: RESOLVED | Noted: 2025-02-12 | Resolved: 2025-05-19

## 2025-05-19 PROBLEM — N91.2 AMENORRHEA: Status: RESOLVED | Noted: 2023-12-14 | Resolved: 2025-05-19

## 2025-05-19 PROBLEM — J30.81 ALLERGIC RHINITIS DUE TO ANIMAL DANDER: Status: RESOLVED | Noted: 2024-04-15 | Resolved: 2025-05-19

## 2025-05-19 PROBLEM — R00.2 PALPITATIONS: Status: RESOLVED | Noted: 2025-02-13 | Resolved: 2025-05-19
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
## 2025-05-19 PROBLEM — Q67.8 CHEST ASYMMETRY: Status: RESOLVED | Noted: 2023-12-16 | Resolved: 2025-05-19

## 2025-05-19 PROBLEM — R16.0 ENLARGED LIVER: Status: RESOLVED | Noted: 2023-12-08 | Resolved: 2025-05-19
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
## 2025-05-19 PROBLEM — F50.9 EATING DISORDER: Status: RESOLVED | Noted: 2024-02-21 | Resolved: 2025-05-19

## 2025-05-19 PROBLEM — M79.671 INTRACTABLE RIGHT HEEL PAIN: Status: RESOLVED | Noted: 2022-02-17 | Resolved: 2025-05-19

## 2025-05-19 PROBLEM — R79.89 ELEVATED LFTS: Status: RESOLVED | Noted: 2023-12-08 | Resolved: 2025-05-19

## 2025-05-19 PROBLEM — G47.00 INSOMNIA: Status: RESOLVED | Noted: 2023-12-01 | Resolved: 2025-05-19

## 2025-05-19 PROBLEM — S06.0XAA CONCUSSION INJURY OF BODY STRUCTURE: Status: RESOLVED | Noted: 2023-02-15 | Resolved: 2025-05-19

## 2025-05-19 PROBLEM — L81.0 POSTINFLAMMATORY HYPERPIGMENTATION: Status: RESOLVED | Noted: 2022-04-07 | Resolved: 2025-05-19

## 2025-05-19 PROBLEM — T14.8XXA WOUND INFECTION: Status: RESOLVED | Noted: 2023-08-19 | Resolved: 2025-05-19

## 2025-05-19 PROBLEM — M79.89 LEG SWELLING: Status: RESOLVED | Noted: 2025-02-12 | Resolved: 2025-05-19

## 2025-05-19 PROBLEM — N83.519 OVARIAN TORSION: Status: RESOLVED | Noted: 2025-02-12 | Resolved: 2025-05-19

## 2025-05-19 PROBLEM — B37.0 ORAL CANDIDA: Status: RESOLVED | Noted: 2023-08-19 | Resolved: 2025-05-19

## 2025-05-19 PROBLEM — R51.9 HEADACHE: Status: RESOLVED | Noted: 2025-02-12 | Resolved: 2025-05-19

## 2025-05-19 PROBLEM — R21 RASH AND OTHER NONSPECIFIC SKIN ERUPTION: Status: RESOLVED | Noted: 2023-06-29 | Resolved: 2025-05-19

## 2025-05-19 PROBLEM — R42 VERTIGO: Status: RESOLVED | Noted: 2025-02-12 | Resolved: 2025-05-19

## 2025-05-19 PROBLEM — N83.511 TORSION OF RIGHT OVARY: Status: RESOLVED | Noted: 2022-12-31 | Resolved: 2025-05-19

## 2025-05-19 PROBLEM — N83.53 TORSION OF OVARY, OVARIAN PEDICLE, OR FALLOPIAN TUBE: Status: RESOLVED | Noted: 2023-02-15 | Resolved: 2025-05-19

## 2025-05-19 PROBLEM — J45.20 MILD INTERMITTENT ASTHMA WITHOUT COMPLICATION (HHS-HCC): Status: ACTIVE | Noted: 2022-02-17

## 2025-05-19 PROBLEM — M21.40 ACQUIRED PES PLANUS: Status: RESOLVED | Noted: 2023-12-14 | Resolved: 2025-05-19

## 2025-05-19 PROBLEM — G62.9 NEUROPATHY: Status: RESOLVED | Noted: 2024-06-13 | Resolved: 2025-05-19

## 2025-05-19 PROBLEM — Z15.89 JAK2 GENE MUTATION: Status: RESOLVED | Noted: 2023-12-14 | Resolved: 2025-05-19

## 2025-05-19 PROBLEM — R20.2 PARESTHESIA: Status: RESOLVED | Noted: 2025-02-12 | Resolved: 2025-05-19

## 2025-05-19 PROBLEM — J30.89 ALLERGIC RHINITIS DUE TO MOLD: Status: RESOLVED | Noted: 2024-04-15 | Resolved: 2025-05-19

## 2025-05-19 PROBLEM — N39.0 URINARY TRACT INFECTION: Status: RESOLVED | Noted: 2025-02-12 | Resolved: 2025-05-19

## 2025-05-19 PROBLEM — L08.9 WOUND INFECTION: Status: RESOLVED | Noted: 2023-08-19 | Resolved: 2025-05-19

## 2025-05-20 ENCOUNTER — SPECIALTY PHARMACY (OUTPATIENT)
Dept: PHARMACY | Facility: CLINIC | Age: 40
End: 2025-05-20

## 2025-05-26 NOTE — PROGRESS NOTES
"Lillian here today for Lanea Veena Haru \"Veena\"'s regularly scheduled immunotherapy injection, per protocol. Patient in good state of health, received Lanea Veena Haru \"Veena\"'s shot as planned, as recorded in flowsheet for this encounter, waited for 30 minutes after Lanea Veena Haru \"Veena\"'s injection and left the office after that still at their baseline state of health. Will continue allergy immunotherapy as planned and call us in case any symptoms or reaction from today's injection are noted.   " I rounded with the team including nurses, ACPs and PharmD.  I reviewed the data in depth.     The patient is day +6 of allogeneic HSCT.    The patient is doing well overall; reports throat/stomach pain when eating;   afebrile, vital signs stable  The vitals are stable. mild mucositis. The line site is clean. The lungs are clear. There is no LE edema. 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  All questions answered.   . I rounded with the team including nurses, ACPs and PharmD.  I reviewed the data in depth.   pt seen and examined    The patient is day +6 of haplo allogeneic HSCT.    The patient is doing well overall; reports occ throat/stomach pain when eating;   afebrile, vital signs stable  The vitals are stable. mild mucositis. The line site is clean. The lungs are clear. There is no LE edema. 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  All questions answered.   .

## 2025-06-04 ENCOUNTER — APPOINTMENT (OUTPATIENT)
Dept: ALLERGY | Facility: HOSPITAL | Age: 40
End: 2025-06-04
Payer: COMMERCIAL

## 2025-06-06 ENCOUNTER — APPOINTMENT (OUTPATIENT)
Dept: ALLERGY | Facility: CLINIC | Age: 40
End: 2025-06-06
Payer: COMMERCIAL

## 2025-06-06 DIAGNOSIS — J30.1 SEASONAL ALLERGIC RHINITIS DUE TO POLLEN: ICD-10-CM

## 2025-06-06 PROCEDURE — RXMED WILLOW AMBULATORY MEDICATION CHARGE

## 2025-06-06 NOTE — Clinical Note
SONIA Curiel had large locals today. They forgot to take antihistamine before visit, so gave 10 mg cetirizine in office. Iced after injections. What would you like us to do next shot appointment on 7/11? Thanks!

## 2025-06-09 ENCOUNTER — PHARMACY VISIT (OUTPATIENT)
Dept: PHARMACY | Facility: CLINIC | Age: 40
End: 2025-06-09
Payer: COMMERCIAL

## 2025-06-12 ENCOUNTER — SPECIALTY PHARMACY (OUTPATIENT)
Dept: HEMATOLOGY/ONCOLOGY | Facility: HOSPITAL | Age: 40
End: 2025-06-12
Payer: COMMERCIAL

## 2025-06-12 NOTE — PROGRESS NOTES
"[facility] Clinical Note  Initial Patient Education     Introduction  Lillian Manriquez \"Veena\" is a 39 y.o. adult who is on the specialty pharmacy service for management of: Oncology Core.    Lillian Manriquez \"Veena\" is initiating the following therapy: rux 10 mg bid       Medication receipt date: yesterday    Duration of therapy: Maintenance    The most recent encounter visit with the referring prescriber smith on 5/13 was reviewed.  Pharmacy will continue to collaborate in the care of this patient with the referring prescriber.    Clinical Background  An initial assessment was conducted prior to first fill of the medication to determine the appropriateness of therapy given the patient's diagnosis, medication list, comorbidities, allergies, medical history, patient's ability to self administer medication, and therapeutic goals based on possible outcomes of therapy. Refer to initial assessment task completed on 6/6.    Labs for clinical appropriateness that were reviewed include:   Cbc cmp    Education/Discussion  Lillian was contacted on 6/12/2025 at 4:00 PM for a pharmacy visit with encounter number [id number] from:   [facility]  [address] [address] [address]  Dept: [phone]  Loc: [phone]  Lillian consented to a/an Telephone visit, which was performed.    Medication Start Date (planned or actual): today         Education was conducted prior to start of therapy? Yes    Education discussed includes the following:     Additional details of the medication specific counseling are found within the linked patient education flowsheet.     The follow up timeline was discussed. Every person responds to and reacts to therapy differently. Patient should be assessed for efficacy and tolerability in approximately: 8-12 weeks    Provided education on goals and possible outcomes of therapy:  Adherence with therapy  Timely " completion of appropriate labs  Timely and appropriate follow up with provider  Identify and address medication interactions with presciption medications, OTC medications and supplements  Optimize or maintain quality of life           The importance of adherence was discussed and they were advised to take the medication as prescribed by their provider.         Impression/Plan       This patient has not been identified as high risk due to Lack of high risk qualifiers.  The following action was taken: N/A.         The  Specialty Pharmacy Welcome packet may be viewed here:   Specialty Pharmacy Welcome Packet     Or by scanning QR code:      Provided contact information ([phone]) for [facility] and reviewed dispensing process, refill timeline and patient management follow up. Advised to contact the pharmacy if there are any adverse effects and/or changes to medication list, including prescriptions, OTC medications, herbal products, or supplements. Confirmed understanding of education conducted during assessment. All questions and concerns were addressed and patient was encouraged to reach out for additional questions or concerns.      Dave West [facility]

## 2025-06-17 DIAGNOSIS — D47.3 ESSENTIAL THROMBOCYTHEMIA (MULTI): Primary | ICD-10-CM

## 2025-07-02 ENCOUNTER — APPOINTMENT (OUTPATIENT)
Dept: ALLERGY | Facility: HOSPITAL | Age: 40
End: 2025-07-02
Payer: COMMERCIAL

## 2025-07-07 ENCOUNTER — SPECIALTY PHARMACY (OUTPATIENT)
Dept: PHARMACY | Facility: CLINIC | Age: 40
End: 2025-07-07

## 2025-07-07 PROCEDURE — RXMED WILLOW AMBULATORY MEDICATION CHARGE

## 2025-07-09 ENCOUNTER — PHARMACY VISIT (OUTPATIENT)
Dept: PHARMACY | Facility: CLINIC | Age: 40
End: 2025-07-09
Payer: COMMERCIAL

## 2025-07-11 ENCOUNTER — APPOINTMENT (OUTPATIENT)
Dept: ALLERGY | Facility: CLINIC | Age: 40
End: 2025-07-11
Payer: COMMERCIAL

## 2025-07-11 VITALS
TEMPERATURE: 97.2 F | SYSTOLIC BLOOD PRESSURE: 127 MMHG | DIASTOLIC BLOOD PRESSURE: 74 MMHG | HEART RATE: 101 BPM | RESPIRATION RATE: 25 BRPM

## 2025-07-11 DIAGNOSIS — J30.9 ALLERGIC RHINITIS, UNSPECIFIED SEASONALITY, UNSPECIFIED TRIGGER: ICD-10-CM

## 2025-07-22 ENCOUNTER — LAB (OUTPATIENT)
Dept: LAB | Facility: HOSPITAL | Age: 40
End: 2025-07-22
Payer: COMMERCIAL

## 2025-07-22 ENCOUNTER — OFFICE VISIT (OUTPATIENT)
Dept: HEMATOLOGY/ONCOLOGY | Facility: HOSPITAL | Age: 40
End: 2025-07-22
Payer: COMMERCIAL

## 2025-07-22 VITALS
DIASTOLIC BLOOD PRESSURE: 70 MMHG | BODY MASS INDEX: 28.06 KG/M2 | HEART RATE: 76 BPM | TEMPERATURE: 97.3 F | SYSTOLIC BLOOD PRESSURE: 136 MMHG | OXYGEN SATURATION: 100 % | WEIGHT: 174.6 LBS | HEIGHT: 66 IN | RESPIRATION RATE: 17 BRPM

## 2025-07-22 DIAGNOSIS — D47.3 ESSENTIAL THROMBOCYTHEMIA (MULTI): ICD-10-CM

## 2025-07-22 DIAGNOSIS — R21 RASH: Primary | ICD-10-CM

## 2025-07-22 LAB
ALBUMIN SERPL BCP-MCNC: 4.7 G/DL (ref 3.4–5)
ALP SERPL-CCNC: 68 U/L (ref 33–120)
ALT SERPL W P-5'-P-CCNC: 13 U/L (ref 7–52)
ANION GAP SERPL CALC-SCNC: 10 MMOL/L (ref 10–20)
AST SERPL W P-5'-P-CCNC: 15 U/L (ref 9–39)
BASOPHILS # BLD AUTO: 0.02 X10*3/UL (ref 0–0.1)
BASOPHILS NFR BLD AUTO: 0.3 %
BILIRUB SERPL-MCNC: 0.5 MG/DL (ref 0–1.2)
BUN SERPL-MCNC: 11 MG/DL (ref 6–23)
CALCIUM SERPL-MCNC: 9.7 MG/DL (ref 8.6–10.3)
CHLORIDE SERPL-SCNC: 106 MMOL/L (ref 98–107)
CO2 SERPL-SCNC: 25 MMOL/L (ref 21–32)
CREAT SERPL-MCNC: 1.02 MG/DL (ref 0.5–1.3)
EGFRCR SERPLBLD CKD-EPI 2021: 71 ML/MIN/1.73M*2
EOSINOPHIL # BLD AUTO: 0.02 X10*3/UL (ref 0–0.7)
EOSINOPHIL NFR BLD AUTO: 0.3 %
ERYTHROCYTE [DISTWIDTH] IN BLOOD BY AUTOMATED COUNT: 11.1 % (ref 11.5–14.5)
GLUCOSE SERPL-MCNC: 89 MG/DL (ref 74–99)
HCT VFR BLD AUTO: 35.3 % (ref 36–52)
HGB BLD-MCNC: 12.1 G/DL (ref 12–17.5)
IMM GRANULOCYTES # BLD AUTO: 0.02 X10*3/UL (ref 0–0.7)
IMM GRANULOCYTES NFR BLD AUTO: 0.3 % (ref 0–0.9)
LYMPHOCYTES # BLD AUTO: 2.66 X10*3/UL (ref 1.2–4.8)
LYMPHOCYTES NFR BLD AUTO: 36.5 %
MCH RBC QN AUTO: 35.4 PG (ref 26–34)
MCHC RBC AUTO-ENTMCNC: 34.3 G/DL (ref 32–36)
MCV RBC AUTO: 103 FL (ref 80–100)
MONOCYTES # BLD AUTO: 0.41 X10*3/UL (ref 0.1–1)
MONOCYTES NFR BLD AUTO: 5.6 %
NEUTROPHILS # BLD AUTO: 4.16 X10*3/UL (ref 1.2–7.7)
NEUTROPHILS NFR BLD AUTO: 57 %
NRBC BLD-RTO: 0 /100 WBCS (ref 0–0)
PLATELET # BLD AUTO: 535 X10*3/UL (ref 150–450)
POTASSIUM SERPL-SCNC: 3.7 MMOL/L (ref 3.5–5.3)
PROT SERPL-MCNC: 7.9 G/DL (ref 6.4–8.2)
RBC # BLD AUTO: 3.42 X10*6/UL (ref 4–5.9)
SODIUM SERPL-SCNC: 137 MMOL/L (ref 136–145)
WBC # BLD AUTO: 7.3 X10*3/UL (ref 4.4–11.3)

## 2025-07-22 PROCEDURE — 80053 COMPREHEN METABOLIC PANEL: CPT

## 2025-07-22 PROCEDURE — 99215 OFFICE O/P EST HI 40 MIN: CPT | Performed by: INTERNAL MEDICINE

## 2025-07-22 PROCEDURE — 3008F BODY MASS INDEX DOCD: CPT | Performed by: INTERNAL MEDICINE

## 2025-07-22 PROCEDURE — 85025 COMPLETE CBC W/AUTO DIFF WBC: CPT

## 2025-07-22 PROCEDURE — 36415 COLL VENOUS BLD VENIPUNCTURE: CPT

## 2025-07-22 PROCEDURE — G2211 COMPLEX E/M VISIT ADD ON: HCPCS | Performed by: INTERNAL MEDICINE

## 2025-07-22 ASSESSMENT — PAIN SCALES - GENERAL: PAINLEVEL_OUTOF10: 6

## 2025-07-22 NOTE — PATIENT INSTRUCTIONS
Have labs checked every 2 weeks (next check week of 8/4).  Try your best to take 2 doses of ruxoltinib (Jakafi) each day about 10-12 hours apart.

## 2025-07-29 NOTE — PROGRESS NOTES
"     Patient ID: Lillian Manriquez \"Shakeel" is a 39 y.o. adult.    ASSESSMENT & PLAN           Oncology History   Essential thrombocythemia (Multi)   3/29/2023 Initial Diagnosis    Essential thrombocythemia (Multi)     5/19/2025 -  Chemotherapy    Ruxolitinib, 84 Day Cycles             Assessment & Plan  Essential thrombocythemia (Multi)  Now on ruxolitinib (10 mg twice daily) for about 4 weeks.  Notes significant improvement in bone pain.  Missing PM doses not infrequently.  Encouraged adherence to second dose as that may help better manage her symptoms.  Will also plan to increase to 15 mg twice daily given stability of counts and ongoing high symptom burden with next refill.  Recommended count checks every 2 weeks.  Rash  Few excoriated areas.  Asked her to send us a picture of lesion as it first erupts.  We may then get better idea of rash.       ______________________________________________________________________________________________________________________________________________    SUBJECTIVE     History of Present Illness  The patient presents for essential thrombocythemia, skin blisters, and hypertension.    She started Jakafi 4 weeks ago, causing fatigue but effectively alleviating bone pain (2/10). She struggles with the second dose due to fatigue, falling asleep at work. She discontinued hydroxyurea, improving sleep and comfort. She misses secondary doses of Jakafi, finding it difficult to take at 7:00 AM due to sleepiness. Bupropion helps her stay awake, and she drinks 4 cups of coffee daily, which is variably effective.    She has blisters and red burns on her skin, improved since stopping Hydrea but severe yesterday. Blisters are itchy, start as patches, then bubble and wrinkle. She applies alcohol or peroxide and covers them with bandages. No fevers noted. She has an allergist appointment next month and plans to see a dermatologist. She is unsure if blisters are related to sun exposure or " "medication.    She monitors her blood pressure at home, noting recent elevations. She did not consume coffee today to see its effect on blood pressure.    She takes hydroxyzine during panic attacks and Xyzal before allergy injections at night.    SOCIAL HISTORY  Works in media at school.    MPN SYMPTOM ASSESSMENT   [0 (absent) - 10 (worst imaginable)]      Fatigue past 24 HRS 7 Filling up quickly 0   Abdominal discomfort 7 Inactivity 8   Problems with concentration 8 Night sweats 4   Itching 9 Bone pain (not diffuse jt pain or arthritis) 2   Fever (>100F) 0 Unintentional weight loss over last 6 months:  0     Total Symptom Score 45 (previously 36)   OBJECTIVE       /70   Pulse 76   Temp 36.3 °C (97.3 °F)   Resp 17   Ht (S) 1.689 m (5' 6.48\")   Wt 79.2 kg (174 lb 9.7 oz)   LMP  (LMP Unknown)   SpO2 100%   BMI 27.78 kg/m²      Physical Exam  Vitals reviewed.   Constitutional:       Appearance: Normal appearance.     Eyes:      Conjunctiva/sclera: Conjunctivae normal.      Pupils: Pupils are equal, round, and reactive to light.       Skin:     General: Skin is warm and dry.      Comments: Several scattered excoriated lesions on extremities, no active pustules or vesicles.     Psychiatric:         Mood and Affect: Mood normal.         Results            This medical note was created with the assistance of artificial intelligence (AI) for documentation purposes. The content has been reviewed and confirmed by the healthcare provider for accuracy and completeness. Patient consented to the use of audio recording and use of AI during their visit.        Catalina Yoder MD          "

## 2025-07-29 NOTE — ASSESSMENT & PLAN NOTE
Now on ruxolitinib (10 mg twice daily) for about 4 weeks.  Notes significant improvement in bone pain.  Missing PM doses not infrequently.  Encouraged adherence to second dose as that may help better manage her symptoms.  Will also plan to increase to 15 mg twice daily given stability of counts and ongoing high symptom burden with next refill.  Recommended count checks every 2 weeks.

## 2025-07-30 ENCOUNTER — APPOINTMENT (OUTPATIENT)
Dept: BEHAVIORAL HEALTH | Facility: CLINIC | Age: 40
End: 2025-07-30
Payer: COMMERCIAL

## 2025-07-30 DIAGNOSIS — F41.9 ANXIETY: ICD-10-CM

## 2025-07-30 DIAGNOSIS — G47.00 INSOMNIA, UNSPECIFIED TYPE: ICD-10-CM

## 2025-07-30 DIAGNOSIS — F43.9 TRAUMA AND STRESSOR-RELATED DISORDER: ICD-10-CM

## 2025-07-30 DIAGNOSIS — F31.9 BIPOLAR 1 DISORDER (MULTI): Primary | ICD-10-CM

## 2025-07-30 PROCEDURE — RXMED WILLOW AMBULATORY MEDICATION CHARGE

## 2025-07-30 RX ORDER — HYDROXYZINE HYDROCHLORIDE 10 MG/1
10 TABLET, FILM COATED ORAL 3 TIMES DAILY PRN
Qty: 90 TABLET | Refills: 0 | Status: SHIPPED | OUTPATIENT
Start: 2025-07-30 | End: 2025-08-29

## 2025-07-30 RX ORDER — OLANZAPINE 5 MG/1
5 TABLET, FILM COATED ORAL 2 TIMES DAILY PRN
Qty: 30 TABLET | Refills: 1 | Status: SHIPPED | OUTPATIENT
Start: 2025-07-30 | End: 2025-08-29

## 2025-07-30 RX ORDER — DUPILUMAB 300 MG/2ML
600 INJECTION, SOLUTION SUBCUTANEOUS
COMMUNITY
Start: 2025-07-22

## 2025-07-30 ASSESSMENT — PATIENT HEALTH QUESTIONNAIRE - PHQ9
1. LITTLE INTEREST OR PLEASURE IN DOING THINGS: NOT AT ALL
2. FEELING DOWN, DEPRESSED OR HOPELESS: SEVERAL DAYS
SUM OF ALL RESPONSES TO PHQ9 QUESTIONS 1 AND 2: 1

## 2025-07-30 NOTE — PROGRESS NOTES
"Adult Ambulatory Psychiatry Progress Note    Pt is at home ([address] [facility] [address] )  Writer is at home office    Virtual or Telephone Consent    An interactive audio and video telecommunication system which permits real time communications between the patient (at the originating site) and provider (at the distant site) was utilized to provide this telehealth service.   Verbal consent was requested and obtained from Lillian Manriquez on this date, 08/01/25 for a telehealth visit.      Assessment/Plan     Impression:  Lillian Manriquez \"Veena\" is a 39 y.o. nonbinary domiciled alone, employed at [employer] part time in the [place] who presents for follow up with CC of Bipolar, Anxiety, and Trauma and stressor related disorder .       Plan:      Bipolar d/o - lamotrigine 150mg daily, bupropion SR 150mg daily, start olanzapine 5mg BID prn agitation  Trauma/anxiety - buspar 5mg BID, duloxetine 20mg daily, hydroxyzine 10mg TID prn panic, stop prazosin 1-3mg at bedtime, topiramate 100mg daily, c/w med ed, psycho ed, supportive psychotherapy to build therapeutic rapport, f/u 3 months    Subjective     Chief Complaint: Bipolar, Anxiety, and Trauma and stressor related disorder    HPI:  Pt arrived on time. Mood \"better\" since last session. They've felt stable since the spring for \"quite some time\". They started working at [employer] in the spring. In the spring they went to see family but they were not very nice. Pt was taking their medicine but started to decompensate and experience significant depressed mood and SI. Pt wants to guidance on what to do in those situations. Discussed having a PRN medicine like olanzapine in these cases.           Objective   Mental Status Exam:  General Appearance: Well groomed, appropriate eye contact  Attitude/Behavior: Cooperative  Motor: No psychomotor agitation or retardation, no tremor or other abnormal movements  Speech: Normal rate, volume, prosody  Mood: \"better\"  Affect: " Irritable  Thought Process: Linear, goal directed  Thought Associations: No loosening of associations  Thought Content: Normal  Perception: No perceptual abnormalities noted  Insight: Intact  Judgement: Intact    Vitals:  There were no vitals filed for this visit.    Current Medications:  Current Outpatient Medications on File Prior to Visit   Medication Sig Dispense Refill    Dupixent Pen 300 mg/2 mL pen injector Inject 2 Pens (600 mg) under the skin every 14 (fourteen) days.      acetaminophen (Tylenol) 500 mg tablet Take 1-2 tablets (500-1,000 mg) by mouth every 6 hours if needed for mild pain (1 - 3).      albuterol 90 mcg/actuation inhaler Inhale 2 puffs see administration instructions. EVERY 4-6 HOURS AS NEEDED. 18 g 3    buPROPion SR (Wellbutrin SR) 150 mg 12 hr tablet Take 1 tablet (150 mg) by mouth once daily. Do not crush, chew, or split. 30 tablet 5    busPIRone (Buspar) 5 mg tablet Take 1 tablet (5 mg) by mouth 2 times a day. 60 tablet 5    cetirizine (ZyrTEC) 10 mg tablet Take 1 tablet (10 mg) by mouth once daily.      clobetasol (Temovate) 0.05 % ointment Apply 1 Application topically if needed. APPLY TO MOSQUITO BITES AS NEEDED      DULoxetine (Cymbalta) 20 mg DR capsule Take 1 capsule (20 mg) by mouth once daily. Do not crush or chew. 30 capsule 5    fluticasone (Flonase) 50 mcg/actuation nasal spray Administer 2 sprays into each nostril once daily. 16 g 3    hydrocortisone 2.5 % cream Apply topically once daily. 30 g 5    lamoTRIgine (LaMICtal) 150 mg tablet Take 1 tablet (150 mg) by mouth once daily. 30 tablet 5    levocetirizine (Xyzal) 5 mg tablet Take 1 tablet by mouth once daily 30 tablet 0    meclizine (Antivert) 12.5 mg tablet Take 1 tablet (12.5 mg) by mouth 3 times a day as needed for dizziness. 30 tablet 1    ondansetron ODT (Zofran-ODT) 4 mg disintegrating tablet Dissolve 1 tablet (4 mg) in the mouth every 8 hours if needed for nausea or vomiting. 20 tablet 0    [START ON 8/11/2025]  ruxolitinib (Jakafi) 15 mg tablet Take 1 tablet (15 mg total) by mouth 2 times a day.  Take at about the same time each day.  Take with or without food. 60 tablet 2    topiramate (Topamax) 50 mg tablet Take 2 tablets (100 mg) by mouth once daily at bedtime. 60 tablet 5    triamcinolone (Kenalog) 0.1 % ointment Apply 1 Application topically if needed (APPLY ONE APPLICATION TOPICALLY ON ARMS/LEGS DAILY AS NEEDED FOR DRY AREAS AND FOR DARK BUMPS).      [DISCONTINUED] hydrOXYzine HCL (Atarax) 10 mg tablet Take 1 tablet (10 mg) by mouth 3 times a day as needed for anxiety. 90 tablet 0    [DISCONTINUED] ruxolitinib (Jakafi) 10 mg tablet Take 1 tablet (10 mg total) by mouth 2 times a day.  Take at about the same time each day.  Take with or without food. 60 tablet 2     Current Facility-Administered Medications on File Prior to Visit   Medication Dose Route Frequency Provider Last Rate Last Admin    ALLERGEN REFILL VIAL 1 & 2   subcutaneous MARY ANNE Morales MD        ALLERGEN REFILL VIAL 1 & 2   subcutaneous MARY ANNE Morales MD           Lab Review:   Lab on 07/22/2025   Component Date Value    WBC 07/22/2025 7.3     nRBC 07/22/2025 0.0     RBC 07/22/2025 3.42 (L)     Hemoglobin 07/22/2025 12.1     Hematocrit 07/22/2025 35.3 (L)     MCV 07/22/2025 103 (H)     MCH 07/22/2025 35.4 (H)     MCHC 07/22/2025 34.3     RDW 07/22/2025 11.1 (L)     Platelets 07/22/2025 535 (H)     Neutrophils % 07/22/2025 57.0     Immature Granulocytes %,* 07/22/2025 0.3     Lymphocytes % 07/22/2025 36.5     Monocytes % 07/22/2025 5.6     Eosinophils % 07/22/2025 0.3     Basophils % 07/22/2025 0.3     Neutrophils Absolute 07/22/2025 4.16     Immature Granulocytes Ab* 07/22/2025 0.02     Lymphocytes Absolute 07/22/2025 2.66     Monocytes Absolute 07/22/2025 0.41     Eosinophils Absolute 07/22/2025 0.02     Basophils Absolute 07/22/2025 0.02     Glucose 07/22/2025 89     Sodium 07/22/2025 137     Potassium 07/22/2025 3.7     Chloride  07/22/2025 106     Bicarbonate 07/22/2025 25     Anion Gap 07/22/2025 10     Urea Nitrogen 07/22/2025 11     Creatinine 07/22/2025 1.02     eGFR 07/22/2025 71     Calcium 07/22/2025 9.7     Albumin 07/22/2025 4.7     Alkaline Phosphatase 07/22/2025 68     Total Protein 07/22/2025 7.9     AST 07/22/2025 15     Bilirubin, Total 07/22/2025 0.5     ALT 07/22/2025 13        Orders:  Diagnoses and all orders for this visit:  Bipolar 1 disorder (Multi)  -     OLANZapine (ZyPREXA) 5 mg tablet; Take 1 tablet (5 mg) by mouth 2 times a day as needed (agitation).  -     Follow Up In Psychiatry; Future  Anxiety  -     hydrOXYzine HCL (Atarax) 10 mg tablet; Take 1 tablet (10 mg) by mouth 3 times a day as needed for anxiety.        Risk Assessment:  Risk of harm to self: Medium Risk - Risk factors include: {Depression, History of impulsivity and/or aggressive behavior , History of trauma or abuse , Loss (relational, social, occupational, financial) , and Medical illness comorbidity , Protective Factors include: Denies current suicidal ideation, Denies history of suicide attempts , Future-oriented talk , Willingness to seek help and support , Age, Access to a variety of clinical interventions , Receiving and engaged in care for mental, physical, and substance use disorders , Support through ongoing medical and mental healthcare relationships , and Current/history of good response to treatment/meds     Risk of harm to others: Low Risk - Risk factors include: No significant risk factors identified on screening. Protective factors include: Lack of known history of harm to others , Lack of known history of violent ideation , Lack of known access to firearms , Sense of community, availability/access to resources and support , Sense of optimism, hope , Interpersonal competence , Affect regulation , Sense of self-efficacy, internal locus of control , and Positive, pro-social family/peer network       PHQ9  Over the past 2 weeks, how often  have you been bothered by any of the following problems?  Little interest or pleasure in doing things: Not at all  Feeling down, depressed, or hopeless: Several days      Next Appointment:  Follow up in 3 months (on 11/13/2025).

## 2025-07-31 ENCOUNTER — SPECIALTY PHARMACY (OUTPATIENT)
Dept: PHARMACY | Facility: CLINIC | Age: 40
End: 2025-07-31

## 2025-08-01 ENCOUNTER — PHARMACY VISIT (OUTPATIENT)
Dept: PHARMACY | Facility: CLINIC | Age: 40
End: 2025-08-01
Payer: COMMERCIAL

## 2025-08-06 ENCOUNTER — APPOINTMENT (OUTPATIENT)
Dept: ALLERGY | Facility: HOSPITAL | Age: 40
End: 2025-08-06
Payer: COMMERCIAL

## 2025-08-08 ENCOUNTER — APPOINTMENT (OUTPATIENT)
Dept: ALLERGY | Facility: CLINIC | Age: 40
End: 2025-08-08
Payer: COMMERCIAL

## 2025-08-08 VITALS — OXYGEN SATURATION: 100 % | SYSTOLIC BLOOD PRESSURE: 114 MMHG | HEART RATE: 69 BPM | DIASTOLIC BLOOD PRESSURE: 75 MMHG

## 2025-08-08 DIAGNOSIS — J30.1 SEASONAL ALLERGIC RHINITIS DUE TO POLLEN: ICD-10-CM

## 2025-08-08 ASSESSMENT — ENCOUNTER SYMPTOMS
DEPRESSION: 1
OCCASIONAL FEELINGS OF UNSTEADINESS: 0
LOSS OF SENSATION IN FEET: 0

## 2025-08-19 DIAGNOSIS — Z12.31 ENCOUNTER FOR SCREENING MAMMOGRAM FOR BREAST CANCER: ICD-10-CM

## 2025-08-26 PROCEDURE — RXMED WILLOW AMBULATORY MEDICATION CHARGE

## 2025-09-02 ENCOUNTER — SPECIALTY PHARMACY (OUTPATIENT)
Dept: PHARMACY | Facility: CLINIC | Age: 40
End: 2025-09-02

## 2025-09-02 ENCOUNTER — LAB (OUTPATIENT)
Dept: LAB | Facility: HOSPITAL | Age: 40
End: 2025-09-02
Payer: COMMERCIAL

## 2025-09-02 ENCOUNTER — OFFICE VISIT (OUTPATIENT)
Dept: HEMATOLOGY/ONCOLOGY | Facility: HOSPITAL | Age: 40
End: 2025-09-02
Payer: COMMERCIAL

## 2025-09-02 VITALS
BODY MASS INDEX: 27.67 KG/M2 | SYSTOLIC BLOOD PRESSURE: 125 MMHG | DIASTOLIC BLOOD PRESSURE: 73 MMHG | TEMPERATURE: 97.5 F | WEIGHT: 173.94 LBS | OXYGEN SATURATION: 98 % | HEART RATE: 92 BPM

## 2025-09-02 DIAGNOSIS — D47.3 ESSENTIAL THROMBOCYTHEMIA (MULTI): ICD-10-CM

## 2025-09-02 DIAGNOSIS — G44.209 TENSION HEADACHE: Primary | ICD-10-CM

## 2025-09-02 LAB
ALBUMIN SERPL BCP-MCNC: 4.8 G/DL (ref 3.4–5)
ALP SERPL-CCNC: 78 U/L (ref 33–120)
ALT SERPL W P-5'-P-CCNC: 27 U/L (ref 7–52)
ANION GAP SERPL CALC-SCNC: 11 MMOL/L (ref 10–20)
AST SERPL W P-5'-P-CCNC: 25 U/L (ref 9–39)
BASOPHILS # BLD AUTO: 0.03 X10*3/UL (ref 0–0.1)
BASOPHILS NFR BLD AUTO: 0.4 %
BILIRUB SERPL-MCNC: 0.5 MG/DL (ref 0–1.2)
BUN SERPL-MCNC: 11 MG/DL (ref 6–23)
CALCIUM SERPL-MCNC: 9.6 MG/DL (ref 8.6–10.3)
CHLORIDE SERPL-SCNC: 106 MMOL/L (ref 98–107)
CO2 SERPL-SCNC: 24 MMOL/L (ref 21–32)
CREAT SERPL-MCNC: 1.23 MG/DL (ref 0.5–1.3)
EGFRCR SERPLBLD CKD-EPI 2021: 57 ML/MIN/1.73M*2
EOSINOPHIL # BLD AUTO: 0.02 X10*3/UL (ref 0–0.7)
EOSINOPHIL NFR BLD AUTO: 0.3 %
ERYTHROCYTE [DISTWIDTH] IN BLOOD BY AUTOMATED COUNT: 11.5 % (ref 11.5–14.5)
GLUCOSE SERPL-MCNC: 90 MG/DL (ref 74–99)
HCT VFR BLD AUTO: 33.7 % (ref 36–52)
HGB BLD-MCNC: 11.8 G/DL (ref 12–17.5)
IMM GRANULOCYTES # BLD AUTO: 0.03 X10*3/UL (ref 0–0.7)
IMM GRANULOCYTES NFR BLD AUTO: 0.4 % (ref 0–0.9)
LYMPHOCYTES # BLD AUTO: 3.58 X10*3/UL (ref 1.2–4.8)
LYMPHOCYTES NFR BLD AUTO: 52.8 %
MCH RBC QN AUTO: 34.4 PG (ref 26–34)
MCHC RBC AUTO-ENTMCNC: 35 G/DL (ref 32–36)
MCV RBC AUTO: 98 FL (ref 80–100)
MONOCYTES # BLD AUTO: 0.35 X10*3/UL (ref 0.1–1)
MONOCYTES NFR BLD AUTO: 5.2 %
NEUTROPHILS # BLD AUTO: 2.77 X10*3/UL (ref 1.2–7.7)
NEUTROPHILS NFR BLD AUTO: 40.9 %
NRBC BLD-RTO: 0 /100 WBCS (ref 0–0)
PLATELET # BLD AUTO: 657 X10*3/UL (ref 150–450)
POTASSIUM SERPL-SCNC: 3.6 MMOL/L (ref 3.5–5.3)
PROT SERPL-MCNC: 7.7 G/DL (ref 6.4–8.2)
RBC # BLD AUTO: 3.43 X10*6/UL (ref 4–5.9)
SODIUM SERPL-SCNC: 137 MMOL/L (ref 136–145)
WBC # BLD AUTO: 6.8 X10*3/UL (ref 4.4–11.3)

## 2025-09-02 PROCEDURE — 85025 COMPLETE CBC W/AUTO DIFF WBC: CPT

## 2025-09-02 PROCEDURE — 80053 COMPREHEN METABOLIC PANEL: CPT

## 2025-09-02 PROCEDURE — 36415 COLL VENOUS BLD VENIPUNCTURE: CPT

## 2025-09-02 PROCEDURE — G2211 COMPLEX E/M VISIT ADD ON: HCPCS | Performed by: INTERNAL MEDICINE

## 2025-09-02 PROCEDURE — 99214 OFFICE O/P EST MOD 30 MIN: CPT | Performed by: INTERNAL MEDICINE

## 2025-09-02 ASSESSMENT — PAIN SCALES - GENERAL: PAINLEVEL_OUTOF10: 0-NO PAIN

## 2025-09-03 ENCOUNTER — APPOINTMENT (OUTPATIENT)
Dept: ALLERGY | Facility: HOSPITAL | Age: 40
End: 2025-09-03
Payer: COMMERCIAL

## 2025-09-03 ENCOUNTER — PHARMACY VISIT (OUTPATIENT)
Dept: PHARMACY | Facility: CLINIC | Age: 40
End: 2025-09-03
Payer: COMMERCIAL

## 2025-09-08 ENCOUNTER — APPOINTMENT (OUTPATIENT)
Dept: ALLERGY | Facility: CLINIC | Age: 40
End: 2025-09-08
Payer: COMMERCIAL

## 2025-09-18 ENCOUNTER — APPOINTMENT (OUTPATIENT)
Dept: BEHAVIORAL HEALTH | Facility: CLINIC | Age: 40
End: 2025-09-18
Payer: COMMERCIAL

## 2025-09-19 ENCOUNTER — APPOINTMENT (OUTPATIENT)
Dept: PRIMARY CARE | Facility: CLINIC | Age: 40
End: 2025-09-19
Payer: COMMERCIAL

## 2025-10-08 ENCOUNTER — APPOINTMENT (OUTPATIENT)
Dept: ALLERGY | Facility: HOSPITAL | Age: 40
End: 2025-10-08
Payer: COMMERCIAL

## 2025-10-23 ENCOUNTER — APPOINTMENT (OUTPATIENT)
Dept: BEHAVIORAL HEALTH | Facility: CLINIC | Age: 40
End: 2025-10-23
Payer: COMMERCIAL

## 2025-11-05 ENCOUNTER — APPOINTMENT (OUTPATIENT)
Dept: ALLERGY | Facility: HOSPITAL | Age: 40
End: 2025-11-05
Payer: COMMERCIAL

## 2025-11-13 ENCOUNTER — APPOINTMENT (OUTPATIENT)
Dept: BEHAVIORAL HEALTH | Facility: CLINIC | Age: 40
End: 2025-11-13
Payer: COMMERCIAL

## 2025-11-25 ENCOUNTER — APPOINTMENT (OUTPATIENT)
Dept: ALLERGY | Facility: HOSPITAL | Age: 40
End: 2025-11-25
Payer: COMMERCIAL

## 2025-12-10 ENCOUNTER — APPOINTMENT (OUTPATIENT)
Dept: ALLERGY | Facility: HOSPITAL | Age: 40
End: 2025-12-10
Payer: COMMERCIAL

## 2026-01-14 ENCOUNTER — APPOINTMENT (OUTPATIENT)
Dept: ALLERGY | Facility: HOSPITAL | Age: 41
End: 2026-01-14
Payer: COMMERCIAL

## 2026-02-11 ENCOUNTER — APPOINTMENT (OUTPATIENT)
Dept: ALLERGY | Facility: HOSPITAL | Age: 41
End: 2026-02-11
Payer: COMMERCIAL

## 2026-03-11 ENCOUNTER — APPOINTMENT (OUTPATIENT)
Dept: ALLERGY | Facility: HOSPITAL | Age: 41
End: 2026-03-11
Payer: COMMERCIAL

## 2026-04-08 ENCOUNTER — APPOINTMENT (OUTPATIENT)
Dept: ALLERGY | Facility: HOSPITAL | Age: 41
End: 2026-04-08
Payer: COMMERCIAL

## 2026-05-13 ENCOUNTER — APPOINTMENT (OUTPATIENT)
Dept: ALLERGY | Facility: HOSPITAL | Age: 41
End: 2026-05-13
Payer: COMMERCIAL